# Patient Record
Sex: MALE | Race: WHITE | NOT HISPANIC OR LATINO | Employment: OTHER | URBAN - METROPOLITAN AREA
[De-identification: names, ages, dates, MRNs, and addresses within clinical notes are randomized per-mention and may not be internally consistent; named-entity substitution may affect disease eponyms.]

---

## 2019-03-14 ENCOUNTER — TRANSFERRED RECORDS (OUTPATIENT)
Dept: HEALTH INFORMATION MANAGEMENT | Facility: CLINIC | Age: 69
End: 2019-03-14

## 2019-03-14 ENCOUNTER — OFFICE VISIT - HEALTHEAST (OUTPATIENT)
Dept: INTERNAL MEDICINE | Facility: CLINIC | Age: 69
End: 2019-03-14

## 2019-03-14 DIAGNOSIS — Z23 IMMUNIZATION DUE: ICD-10-CM

## 2019-03-14 DIAGNOSIS — R19.8 ABDOMINAL FULLNESS: ICD-10-CM

## 2019-03-14 DIAGNOSIS — R16.1 SPLENOMEGALY: ICD-10-CM

## 2019-03-14 DIAGNOSIS — Z11.59 NEED FOR HEPATITIS C SCREENING TEST: ICD-10-CM

## 2019-03-14 DIAGNOSIS — Z71.89 ADVANCED CARE PLANNING/COUNSELING DISCUSSION: ICD-10-CM

## 2019-03-14 DIAGNOSIS — Z13.1 SCREENING FOR DIABETES MELLITUS: ICD-10-CM

## 2019-03-14 DIAGNOSIS — Z12.11 SCREEN FOR COLON CANCER: ICD-10-CM

## 2019-03-14 DIAGNOSIS — Z13.220 SCREENING FOR HYPERLIPIDEMIA: ICD-10-CM

## 2019-03-14 DIAGNOSIS — Z00.00 ROUTINE GENERAL MEDICAL EXAMINATION AT A HEALTH CARE FACILITY: ICD-10-CM

## 2019-03-14 DIAGNOSIS — Z12.11 SCREENING FOR COLON CANCER: ICD-10-CM

## 2019-03-14 DIAGNOSIS — R33.9 URINARY RETENTION: ICD-10-CM

## 2019-03-14 LAB
ALBUMIN SERPL-MCNC: 3.6 G/DL (ref 3.5–5)
ALBUMIN UR-MCNC: NEGATIVE MG/DL
ALP SERPL-CCNC: 55 U/L (ref 45–120)
ALT SERPL W P-5'-P-CCNC: 17 U/L (ref 0–45)
ANION GAP SERPL CALCULATED.3IONS-SCNC: 8 MMOL/L (ref 5–18)
APPEARANCE UR: CLEAR
AST SERPL W P-5'-P-CCNC: 23 U/L (ref 0–40)
BACTERIA #/AREA URNS HPF: NORMAL HPF
BILIRUB SERPL-MCNC: 1 MG/DL (ref 0–1)
BILIRUB UR QL STRIP: NEGATIVE
BUN SERPL-MCNC: 12 MG/DL (ref 8–22)
CALCIUM SERPL-MCNC: 8.8 MG/DL (ref 8.5–10.5)
CHLORIDE BLD-SCNC: 102 MMOL/L (ref 98–107)
CHOLEST SERPL-MCNC: 178 MG/DL
CO2 SERPL-SCNC: 26 MMOL/L (ref 22–31)
COLOR UR AUTO: YELLOW
CREAT SERPL-MCNC: 0.83 MG/DL (ref 0.7–1.3)
FASTING STATUS PATIENT QL REPORTED: YES
GFR SERPL CREATININE-BSD FRML MDRD: >60 ML/MIN/1.73M2
GLUCOSE BLD-MCNC: 80 MG/DL (ref 70–125)
GLUCOSE UR STRIP-MCNC: NEGATIVE MG/DL
HDLC SERPL-MCNC: 65 MG/DL
HGB UR QL STRIP: ABNORMAL
KETONES UR STRIP-MCNC: NEGATIVE MG/DL
LDLC SERPL CALC-MCNC: 97 MG/DL
LEUKOCYTE ESTERASE UR QL STRIP: NEGATIVE
NITRATE UR QL: NEGATIVE
PH UR STRIP: 7 [PH] (ref 5–8)
POTASSIUM BLD-SCNC: 4.1 MMOL/L (ref 3.5–5)
PROT SERPL-MCNC: 7.3 G/DL (ref 6–8)
RBC #/AREA URNS AUTO: NORMAL HPF
SODIUM SERPL-SCNC: 136 MMOL/L (ref 136–145)
SP GR UR STRIP: 1.01 (ref 1–1.03)
SQUAMOUS #/AREA URNS AUTO: NORMAL LPF
TRIGL SERPL-MCNC: 78 MG/DL
UROBILINOGEN UR STRIP-ACNC: ABNORMAL
WBC #/AREA URNS AUTO: NORMAL HPF

## 2019-03-14 ASSESSMENT — MIFFLIN-ST. JEOR: SCORE: 1628.08

## 2019-03-15 LAB — HCV AB SERPL QL IA: NEGATIVE

## 2019-03-19 ENCOUNTER — RECORDS - HEALTHEAST (OUTPATIENT)
Dept: ADMINISTRATIVE | Facility: OTHER | Age: 69
End: 2019-03-19

## 2019-03-19 LAB — COLOGUARD-ABSTRACT: POSITIVE

## 2019-03-22 ENCOUNTER — COMMUNICATION - HEALTHEAST (OUTPATIENT)
Dept: INTERNAL MEDICINE | Facility: CLINIC | Age: 69
End: 2019-03-22

## 2019-03-22 ENCOUNTER — TRANSFERRED RECORDS (OUTPATIENT)
Dept: HEALTH INFORMATION MANAGEMENT | Facility: CLINIC | Age: 69
End: 2019-03-22

## 2019-03-22 ENCOUNTER — HOSPITAL ENCOUNTER (OUTPATIENT)
Dept: CT IMAGING | Facility: CLINIC | Age: 69
Discharge: HOME | End: 2019-03-22
Attending: INTERNAL MEDICINE

## 2019-03-22 DIAGNOSIS — R19.8 ABDOMINAL FULLNESS: ICD-10-CM

## 2019-03-28 ENCOUNTER — COMMUNICATION - HEALTHEAST (OUTPATIENT)
Dept: INTERNAL MEDICINE | Facility: CLINIC | Age: 69
End: 2019-03-28

## 2019-03-29 ENCOUNTER — RECORDS - HEALTHEAST (OUTPATIENT)
Dept: HEALTH INFORMATION MANAGEMENT | Facility: CLINIC | Age: 69
End: 2019-03-29

## 2019-04-02 ENCOUNTER — RECORDS - HEALTHEAST (OUTPATIENT)
Dept: ADMINISTRATIVE | Facility: OTHER | Age: 69
End: 2019-04-02

## 2019-04-02 ENCOUNTER — COMMUNICATION - HEALTHEAST (OUTPATIENT)
Dept: INTERNAL MEDICINE | Facility: CLINIC | Age: 69
End: 2019-04-02

## 2019-04-03 ENCOUNTER — RECORDS - HEALTHEAST (OUTPATIENT)
Dept: ADMINISTRATIVE | Facility: OTHER | Age: 69
End: 2019-04-03

## 2019-04-03 ENCOUNTER — COMMUNICATION - HEALTHEAST (OUTPATIENT)
Dept: SCHEDULING | Facility: CLINIC | Age: 69
End: 2019-04-03

## 2019-04-03 ENCOUNTER — TRANSFERRED RECORDS (OUTPATIENT)
Dept: HEALTH INFORMATION MANAGEMENT | Facility: CLINIC | Age: 69
End: 2019-04-03

## 2019-04-05 ENCOUNTER — COMMUNICATION - HEALTHEAST (OUTPATIENT)
Dept: ONCOLOGY | Facility: CLINIC | Age: 69
End: 2019-04-05

## 2019-04-15 ENCOUNTER — RECORDS - HEALTHEAST (OUTPATIENT)
Dept: ADMINISTRATIVE | Facility: OTHER | Age: 69
End: 2019-04-15

## 2019-04-16 ENCOUNTER — RECORDS - HEALTHEAST (OUTPATIENT)
Dept: ADMINISTRATIVE | Facility: OTHER | Age: 69
End: 2019-04-16

## 2019-04-16 ENCOUNTER — OFFICE VISIT - HEALTHEAST (OUTPATIENT)
Dept: ONCOLOGY | Facility: CLINIC | Age: 69
End: 2019-04-16

## 2019-04-16 DIAGNOSIS — R16.1 SPLENOMEGALY: ICD-10-CM

## 2019-04-16 DIAGNOSIS — D72.820 LYMPHOCYTOSIS: ICD-10-CM

## 2019-04-16 LAB
ALBUMIN SERPL-MCNC: 3.7 G/DL (ref 3.5–5)
ALP SERPL-CCNC: 56 U/L (ref 45–120)
ALT SERPL W P-5'-P-CCNC: 14 U/L (ref 0–45)
ANION GAP SERPL CALCULATED.3IONS-SCNC: 5 MMOL/L (ref 5–18)
AST SERPL W P-5'-P-CCNC: 21 U/L (ref 0–40)
BASOPHILS # BLD AUTO: 0.2 THOU/UL (ref 0–0.2)
BASOPHILS NFR BLD AUTO: 2 % (ref 0–2)
BILIRUB SERPL-MCNC: 0.8 MG/DL (ref 0–1)
BUN SERPL-MCNC: 8 MG/DL (ref 8–22)
CALCIUM SERPL-MCNC: 8.6 MG/DL (ref 8.5–10.5)
CHLORIDE BLD-SCNC: 99 MMOL/L (ref 98–107)
CO2 SERPL-SCNC: 28 MMOL/L (ref 22–31)
CREAT SERPL-MCNC: 0.84 MG/DL (ref 0.7–1.3)
EOSINOPHIL COUNT (ABSOLUTE): 0 THOU/UL (ref 0–0.4)
EOSINOPHIL NFR BLD AUTO: 0 % (ref 0–6)
ERYTHROCYTE [DISTWIDTH] IN BLOOD BY AUTOMATED COUNT: 13.2 % (ref 11–14.5)
ERYTHROCYTE [DISTWIDTH] IN BLOOD BY AUTOMATED COUNT: 13.2 % (ref 11–14.5)
FOLATE SERPL-MCNC: 13.8 NG/ML
GFR SERPL CREATININE-BSD FRML MDRD: >60 ML/MIN/1.73M2
GLUCOSE BLD-MCNC: 73 MG/DL (ref 70–125)
HCT VFR BLD AUTO: 42.5 % (ref 40–54)
HCT VFR BLD AUTO: 42.5 % (ref 40–54)
HGB BLD-MCNC: 14.7 G/DL (ref 14–18)
HGB BLD-MCNC: 14.7 G/DL (ref 14–18)
IGA SERPL-MCNC: 2298 MG/DL
IGA SERPL-MCNC: 32 MG/DL (ref 65–400)
IGM SERPL-MCNC: 41 MG/DL (ref 60–280)
LYMPHOCYTES # BLD AUTO: 5.8 THOU/UL (ref 0.8–4.4)
LYMPHOCYTES NFR BLD AUTO: 64 % (ref 20–40)
MCH RBC QN AUTO: 32.4 PG (ref 27–34)
MCH RBC QN AUTO: 32.4 PG (ref 27–34)
MCHC RBC AUTO-ENTMCNC: 34.6 G/DL (ref 32–36)
MCHC RBC AUTO-ENTMCNC: 34.6 G/DL (ref 32–36)
MCV RBC AUTO: 94 FL (ref 80–100)
MCV RBC AUTO: 94 FL (ref 80–100)
MONOCYTES # BLD AUTO: 0.2 THOU/UL (ref 0–0.9)
MONOCYTES NFR BLD AUTO: 2 % (ref 2–10)
PATH REPORT.MICROSCOPIC SPEC OTHER STN: ABNORMAL
PLAT MORPH BLD: ABNORMAL
PLATELET # BLD AUTO: 126 THOU/UL (ref 140–440)
PLATELET # BLD AUTO: 126 THOU/UL (ref 140–440)
PMV BLD AUTO: 9.2 FL (ref 8.5–12.5)
PMV BLD AUTO: 9.2 FL (ref 8.5–12.5)
POTASSIUM BLD-SCNC: 3.8 MMOL/L (ref 3.5–5)
PROT SERPL-MCNC: 7.2 G/DL (ref 6–8)
RBC # BLD AUTO: 4.54 MILL/UL (ref 4.4–6.2)
RBC # BLD AUTO: 4.54 MILL/UL (ref 4.4–6.2)
SODIUM SERPL-SCNC: 132 MMOL/L (ref 136–145)
TOTAL NEUTROPHILS-ABS(DIFF): 2.9 THOU/UL (ref 2–7.7)
TOTAL NEUTROPHILS-REL(DIFF): 32 % (ref 50–70)
VIT B12 SERPL-MCNC: 1009 PG/ML (ref 213–816)
WBC: 9 THOU/UL (ref 4–11)
WBC: 9 THOU/UL (ref 4–11)

## 2019-04-16 ASSESSMENT — MIFFLIN-ST. JEOR: SCORE: 1610.84

## 2019-04-17 ENCOUNTER — RECORDS - HEALTHEAST (OUTPATIENT)
Dept: ADMINISTRATIVE | Facility: OTHER | Age: 69
End: 2019-04-17

## 2019-04-17 LAB
B2 MICROGLOB TUMOR MARKER SER-MCNC: 2.5 MG/L
KAPPA LC FREE SER-MCNC: 0.78 MG/DL (ref 0.33–1.94)
KAPPA LC FREE/LAMBDA FREE SER NEPH: 0.04 {RATIO} (ref 0.26–1.65)
LAB AP CHARGES (HE HISTORICAL CONVERSION): ABNORMAL
LAMBDA LC FREE SERPL-MCNC: 17.5 MG/DL (ref 0.57–2.63)
PATH ICD:: NORMAL
PATH REPORT.COMMENTS IMP SPEC: ABNORMAL
PATH REPORT.COMMENTS IMP SPEC: ABNORMAL
PATH REPORT.FINAL DX SPEC: ABNORMAL
PATH REPORT.MICROSCOPIC SPEC OTHER STN: ABNORMAL
PATH REPORT.RELEVANT HX SPEC: ABNORMAL
PROT PATTERN SERPL IFE-IMP: NORMAL
RESULT FLAG (HE HISTORICAL CONVERSION): ABNORMAL
REVIEWING PATHOLOGIST: NORMAL

## 2019-04-18 LAB
ALBUMIN PERCENT: 56.1 % (ref 51–67)
ALBUMIN SERPL ELPH-MCNC: 3.9 G/DL (ref 3.2–4.7)
ALPHA 1 PERCENT: 2.4 % (ref 2–4)
ALPHA 2 PERCENT: 7.9 % (ref 5–13)
ALPHA1 GLOB SERPL ELPH-MCNC: 0.2 G/DL (ref 0.1–0.3)
ALPHA2 GLOB SERPL ELPH-MCNC: 0.6 G/DL (ref 0.4–0.9)
B-GLOBULIN SERPL ELPH-MCNC: 0.6 G/DL (ref 0.7–1.2)
BETA PERCENT: 8.4 % (ref 10–17)
GAMMA GLOB SERPL ELPH-MCNC: 1.8 G/DL (ref 0.6–1.4)
GAMMA GLOBULIN PERCENT: 25.2 % (ref 9–20)
LAB AP CHARGES (HE HISTORICAL CONVERSION): ABNORMAL
M PROTEIN SERPL ELPH-MCNC: 1.4 G/DL
PATH ICD:: ABNORMAL
PATH REPORT.COMMENTS IMP SPEC: ABNORMAL
PATH REPORT.COMMENTS IMP SPEC: ABNORMAL
PATH REPORT.FINAL DX SPEC: ABNORMAL
PATH REPORT.MICROSCOPIC SPEC OTHER STN: ABNORMAL
PROT PATTERN SERPL ELPH-IMP: ABNORMAL
PROT SERPL-MCNC: 7 G/DL (ref 6–8)
RESULT FLAG (HE HISTORICAL CONVERSION): ABNORMAL
REVIEWING PATHOLOGIST: ABNORMAL

## 2019-04-22 LAB
PATH ICD:: NORMAL
PROT ELPH PNL UR ELPH: NORMAL
REVIEWING PATHOLOGIST: NORMAL

## 2019-04-24 ENCOUNTER — COMMUNICATION - HEALTHEAST (OUTPATIENT)
Dept: INTERNAL MEDICINE | Facility: CLINIC | Age: 69
End: 2019-04-24

## 2019-04-25 ENCOUNTER — OFFICE VISIT - HEALTHEAST (OUTPATIENT)
Dept: ONCOLOGY | Facility: CLINIC | Age: 69
End: 2019-04-25

## 2019-04-25 DIAGNOSIS — R16.1 SPLENOMEGALY: ICD-10-CM

## 2019-04-25 DIAGNOSIS — D47.2 MGUS (MONOCLONAL GAMMOPATHY OF UNKNOWN SIGNIFICANCE): ICD-10-CM

## 2019-04-25 DIAGNOSIS — Z13.79 ENCOUNTER FOR OTHER SCREENING FOR GENETIC AND CHROMOSOMAL ANOMALIES: ICD-10-CM

## 2019-04-25 DIAGNOSIS — C91.10 CLL (CHRONIC LYMPHOCYTIC LEUKEMIA) (H): ICD-10-CM

## 2019-05-01 ENCOUNTER — RECORDS - HEALTHEAST (OUTPATIENT)
Dept: ADMINISTRATIVE | Facility: OTHER | Age: 69
End: 2019-05-01

## 2019-05-01 ENCOUNTER — TRANSFERRED RECORDS (OUTPATIENT)
Dept: HEALTH INFORMATION MANAGEMENT | Facility: CLINIC | Age: 69
End: 2019-05-01

## 2019-05-01 ENCOUNTER — COMMUNICATION - HEALTHEAST (OUTPATIENT)
Dept: INTERNAL MEDICINE | Facility: CLINIC | Age: 69
End: 2019-05-01

## 2019-05-01 DIAGNOSIS — R33.9 URINARY RETENTION: ICD-10-CM

## 2019-05-02 ENCOUNTER — AMBULATORY - HEALTHEAST (OUTPATIENT)
Dept: INFUSION THERAPY | Facility: CLINIC | Age: 69
End: 2019-05-02

## 2019-05-02 ENCOUNTER — AMBULATORY - HEALTHEAST (OUTPATIENT)
Dept: LAB | Facility: CLINIC | Age: 69
End: 2019-05-02

## 2019-05-02 DIAGNOSIS — C91.10 CLL (CHRONIC LYMPHOCYTIC LEUKEMIA) (H): ICD-10-CM

## 2019-05-02 DIAGNOSIS — D47.2 MGUS (MONOCLONAL GAMMOPATHY OF UNKNOWN SIGNIFICANCE): ICD-10-CM

## 2019-05-02 DIAGNOSIS — Z13.79 ENCOUNTER FOR OTHER SCREENING FOR GENETIC AND CHROMOSOMAL ANOMALIES: ICD-10-CM

## 2019-05-02 LAB
BASOPHILS # BLD AUTO: 0 THOU/UL (ref 0–0.2)
BASOPHILS NFR BLD AUTO: 0 % (ref 0–2)
EOSINOPHIL COUNT (ABSOLUTE): 0.1 THOU/UL (ref 0–0.4)
EOSINOPHIL NFR BLD AUTO: 1 % (ref 0–6)
ERYTHROCYTE [DISTWIDTH] IN BLOOD BY AUTOMATED COUNT: 13.1 % (ref 11–14.5)
HCT VFR BLD AUTO: 40.2 % (ref 40–54)
HGB BLD-MCNC: 13.6 G/DL (ref 14–18)
LYMPHOCYTES # BLD AUTO: 5.9 THOU/UL (ref 0.8–4.4)
LYMPHOCYTES NFR BLD AUTO: 60 % (ref 20–40)
MCH RBC QN AUTO: 32.2 PG (ref 27–34)
MCHC RBC AUTO-ENTMCNC: 33.8 G/DL (ref 32–36)
MCV RBC AUTO: 95 FL (ref 80–100)
MONOCYTES # BLD AUTO: 0.5 THOU/UL (ref 0–0.9)
MONOCYTES NFR BLD AUTO: 5 % (ref 2–10)
PLAT MORPH BLD: ABNORMAL
PLATELET # BLD AUTO: 126 THOU/UL (ref 140–440)
PMV BLD AUTO: 8.9 FL (ref 8.5–12.5)
RBC # BLD AUTO: 4.22 MILL/UL (ref 4.4–6.2)
REACTIVE LYMPHS: ABNORMAL
TOTAL NEUTROPHILS-ABS(DIFF): 3.4 THOU/UL (ref 2–7.7)
TOTAL NEUTROPHILS-REL(DIFF): 34 % (ref 50–70)
WBC: 9.9 THOU/UL (ref 4–11)

## 2019-05-03 ENCOUNTER — COMMUNICATION - HEALTHEAST (OUTPATIENT)
Dept: INTERNAL MEDICINE | Facility: CLINIC | Age: 69
End: 2019-05-03

## 2019-05-03 LAB
LAB AP CHARGES (HE HISTORICAL CONVERSION): ABNORMAL
PATH REPORT.COMMENTS IMP SPEC: ABNORMAL
PATH REPORT.COMMENTS IMP SPEC: ABNORMAL
PATH REPORT.FINAL DX SPEC: ABNORMAL
PATH REPORT.MICROSCOPIC SPEC OTHER STN: ABNORMAL
RESULT FLAG (HE HISTORICAL CONVERSION): ABNORMAL

## 2019-05-05 ENCOUNTER — COMMUNICATION - HEALTHEAST (OUTPATIENT)
Dept: INTERNAL MEDICINE | Facility: CLINIC | Age: 69
End: 2019-05-05

## 2019-05-07 ENCOUNTER — COMMUNICATION - HEALTHEAST (OUTPATIENT)
Dept: INTERNAL MEDICINE | Facility: CLINIC | Age: 69
End: 2019-05-07

## 2019-05-08 ENCOUNTER — RECORDS - HEALTHEAST (OUTPATIENT)
Dept: ADMINISTRATIVE | Facility: OTHER | Age: 69
End: 2019-05-08

## 2019-05-09 ENCOUNTER — PRE VISIT (OUTPATIENT)
Dept: UROLOGY | Facility: CLINIC | Age: 69
End: 2019-05-09

## 2019-05-09 NOTE — TELEPHONE ENCOUNTER
MEDICAL RECORDS REQUEST   [facility]  Urology Clinic  [address] [place] [address]  PHONE: [phone]  Fax: [phone]        FUTURE VISIT INFORMATION                                                   Hilario Meeks, : 1950 scheduled for future visit at [facility]    APPOINTMENT INFORMATION:    Date: 19 10AM    Provider:  Monty Jaramillo MD    Reason for Visit/Diagnosis: Urinary Retention    REFERRAL INFORMATION:    Referring provider: Matthew Hopper    Specialty: MD    Referring providers clinic:  [facility]     Clinic contact number:  [phone]    RECORDS REQUESTED FOR VISIT                                                     NOTES  STATUS/DETAILS   OFFICE NOTE from referring provider  yes   OFFICE NOTE from other specialist  no   DISCHARGE SUMMARY from hospital  no   DISCHARGE REPORT from the ER  no   OPERATIVE REPORT  no   MEDICATION LIST  yes   LABS     URINALYSIS (UA)  yes     PRE-VISIT CHECKLIST      Record collection complete If no, please explain:  [facility] recs in [facility] Urology recs received   CT Abd Pelvis 3/22/19 in  PACS    Appointment appropriately scheduled           (right time/right provider) Yes   MyChart activation If no, please explain: In process   Questionnaire complete If no, please explain: In process     Completed by: Danika Veloz

## 2019-05-10 LAB
BKR LAB AP CORE BIOPSY/ASPIRATE CLOT: ABNORMAL
LAB AP ASPIRATE SMEAR (HE HISTORICAL CONVERSION): ABNORMAL
LAB AP BONE MARROW DIFF (HE HISTORICAL CONVERSION): ABNORMAL
LAB AP CHARGES (HE HISTORICAL CONVERSION): ABNORMAL
LAB AP CYTOGENETICS REPORT,ADDENDUM (HE HISTORICAL CONVERSION): ABNORMAL
PATH REPORT.COMMENTS IMP SPEC: ABNORMAL
PATH REPORT.FINAL DX SPEC: ABNORMAL
PATH REPORT.MICROSCOPIC SPEC OTHER STN: ABNORMAL
PATH REPORT.MICROSCOPIC SPEC OTHER STN: ABNORMAL
PATH REPORT.RELEVANT HX SPEC: ABNORMAL
RESULT FLAG (HE HISTORICAL CONVERSION): ABNORMAL

## 2019-05-11 LAB
MISCELLANEOUS TEST DEPT. - HE HISTORICAL: NORMAL
PERFORMING LAB: NORMAL
SPECIMEN STATUS: NORMAL
SPECIMEN STATUS: NORMAL
TEST NAME: NORMAL

## 2019-05-14 ENCOUNTER — OFFICE VISIT - HEALTHEAST (OUTPATIENT)
Dept: ONCOLOGY | Facility: CLINIC | Age: 69
End: 2019-05-14

## 2019-05-14 DIAGNOSIS — D47.2 MGUS (MONOCLONAL GAMMOPATHY OF UNKNOWN SIGNIFICANCE): ICD-10-CM

## 2019-05-14 DIAGNOSIS — C91.10 CLL (CHRONIC LYMPHOCYTIC LEUKEMIA) (H): ICD-10-CM

## 2019-05-28 ENCOUNTER — PRE VISIT (OUTPATIENT)
Dept: UROLOGY | Facility: CLINIC | Age: 69
End: 2019-05-28

## 2019-05-29 ENCOUNTER — RECORDS - HEALTHEAST (OUTPATIENT)
Dept: ADMINISTRATIVE | Facility: OTHER | Age: 69
End: 2019-05-29

## 2019-05-29 ENCOUNTER — OFFICE VISIT (OUTPATIENT)
Dept: UROLOGY | Facility: CLINIC | Age: 69
End: 2019-05-29
Payer: COMMERCIAL

## 2019-05-29 VITALS
SYSTOLIC BLOOD PRESSURE: 139 MMHG | HEART RATE: 53 BPM | WEIGHT: 169.8 LBS | HEIGHT: 74 IN | BODY MASS INDEX: 21.79 KG/M2 | DIASTOLIC BLOOD PRESSURE: 92 MMHG

## 2019-05-29 DIAGNOSIS — R33.9 URINARY RETENTION: ICD-10-CM

## 2019-05-29 DIAGNOSIS — R39.9 LOWER URINARY TRACT SYMPTOMS: Primary | ICD-10-CM

## 2019-05-29 ASSESSMENT — ENCOUNTER SYMPTOMS
HEMATURIA: 1
BRUISES/BLEEDS EASILY: 0
DYSURIA: 0
SWOLLEN GLANDS: 0
FLANK PAIN: 0

## 2019-05-29 ASSESSMENT — MIFFLIN-ST. JEOR: SCORE: 1609.96

## 2019-05-29 ASSESSMENT — PAIN SCALES - GENERAL: PAINLEVEL: NO PAIN (0)

## 2019-05-29 NOTE — LETTER
"5/29/2019       RE: Hilario Meeks  [address]  [address]  [facility] [address]     Dear Colleague,    Thank you for referring your patient, Hilario Meeks, to the [facility] UROLOGY AND INST FOR PROSTATE AND UROLOGIC CANCERS at [facility] Please see a copy of my visit note below.      Name: Hilario Meeks    MRN: [id number]   YOB: 1950                 Chief Complaint:   BPH with urinary retention          History of Present Illness:   Mr. Hilario Meeks is a 69 year old male seen in consultation today.  He is here for urinary retention with BPH.  This past summer - about 9-12 months ago, he went into urinary retention with a distended abdomen.  CT demonstrated a distended bladder.  About 10 weeks ago, went to [facility]. Catheter was placed.  Flomax was started. This was increased at one point to BID. He is not taking Flomax now.  He tried a voiding trial.  They recommended a TURP.  Cystoscopy was done at [facility].  PSA in April 2019 - 3.19         Past Medical History:     Past Medical History:   Diagnosis Date     BPH (benign prostatic hyperplasia)             Past Surgical History:   Tonsillectomy         Social History:     Social History     Tobacco Use     Smoking status: Never Smoker     Smokeless tobacco: Never Used   Substance Use Topics     Alcohol use: Not on file            Family History:   None relevant         Allergies:   No Known Allergies         Medications:   Medications:    None        Review of Systems:    ROS: 14 point ROS neg other than the symptoms noted above in the HPI and PMH.          Physical Exam:   B/P: 139/92, T: Data Unavailable, P: 53, R: Data Unavailable  Estimated body mass index is 21.8 kg/m  as calculated from the following:    Height as of this encounter: 1.88 m (6' 2\").    Weight as of this encounter: 77 kg (169 lb 12.8 oz).  General: age-appropriate appearing male in NAD. normal body " habitus.  HEENT: Head AT/NC, EOMI, CN Grossly intact  Resp: no respiratory distress, lung sounds clear.  CV: heart rate regular, S1, S2.  Lymph: No cervical, supraclavicular or axillary lymphadenopathy  Back: bony spine is non-tender, flanks are nontender  Abdomen: no obesity , soft, non-distended, non-tender. No organomegaly  : testicles normal without atrophy or masses and penis normal without urethral discharge; 16 fr catheter in place  LE: no edema.   Neuro: grossly intact  Motor: excellent strength throughout  Skin: clear of rashes or ecchymoses.        Labs:    All laboratory data reviewed with patient  Significant for PSA 3.19      Imaging:    All imaging reviewed with patient.  Significant for CT with massively distended bladder.   Very large prostate: 6.5 cm x 6.5cm x 5.5 cm by my measurement      Outside records:    I spent 10 minutes reviewing outside records.         Assessment and Plan:   69 year old male with BPH.  Massively distended bladder  Large prostate which is obstructive. Failed voiding trials x 2 on Flomax.  Here for second opinion re: BPH treatment options.  Discussed TURP vs. HoLEP vs. Simple prostatectomy.  Discussed I would favor HOLEP given size of prostate.   Current catheter was removed. New 16Fr catheter was placed without difficulty. 10cc in the balloon.  Refer to Dr. Turner for cystoscopic evaluation and discussion of surgical options.    Monty Jaramillo MD  May 29, 2019

## 2019-05-29 NOTE — PATIENT INSTRUCTIONS
Please follow up with Dr. Bright for Cystoscopy, within the next month, and a cath change at that time (16 Fr regular urethra cath).      It was a pleasure meeting with you today.  Thank you for allowing me and my team the privilege of caring for you today.  YOU are the reason we are here, and I truly hope we provided you with the excellent service you deserve.  Please let us know if there is anything else we can do for you so that we can be sure you are leaving completely satisfied with your care experience.      Hilario Biggs

## 2019-05-29 NOTE — PROGRESS NOTES
"  Name: Hilario Meeks    MRN: [id number]   YOB: 1950                 Chief Complaint:   BPH with urinary retention          History of Present Illness:   Mr. Hilario Meeks is a 69 year old male seen in consultation today.  He is here for urinary retention with BPH.  This past summer - about 9-12 months ago, he went into urinary retention with a distended abdomen.  CT demonstrated a distended bladder.  About 10 weeks ago, went to [facility]. Catheter was placed.  Flomax was started. This was increased at one point to BID. He is not taking Flomax now.  He tried a voiding trial.  They recommended a TURP.  Cystoscopy was done at [facility].  PSA in April 2019 - 3.19         Past Medical History:     Past Medical History:   Diagnosis Date     BPH (benign prostatic hyperplasia)             Past Surgical History:   Tonsillectomy         Social History:     Social History     Tobacco Use     Smoking status: Never Smoker     Smokeless tobacco: Never Used   Substance Use Topics     Alcohol use: Not on file            Family History:   None relevant         Allergies:   No Known Allergies         Medications:   Medications:    None        Review of Systems:    ROS: 14 point ROS neg other than the symptoms noted above in the HPI and PMH.          Physical Exam:   B/P: 139/92, T: Data Unavailable, P: 53, R: Data Unavailable  Estimated body mass index is 21.8 kg/m  as calculated from the following:    Height as of this encounter: 1.88 m (6' 2\").    Weight as of this encounter: 77 kg (169 lb 12.8 oz).  General: age-appropriate appearing male in NAD. normal body habitus.  HEENT: Head AT/NC, EOMI, CN Grossly intact  Resp: no respiratory distress, lung sounds clear.  CV: heart rate regular, S1, S2.  Lymph: No cervical, supraclavicular or axillary lymphadenopathy  Back: bony spine is non-tender, flanks are nontender  Abdomen: no obesity , soft, non-distended, non-tender. No organomegaly  : " testicles normal without atrophy or masses and penis normal without urethral discharge; 16 fr catheter in place  LE: no edema.   Neuro: grossly intact  Motor: excellent strength throughout  Skin: clear of rashes or ecchymoses.        Labs:    All laboratory data reviewed with patient  Significant for PSA 3.19      Imaging:    All imaging reviewed with patient.  Significant for CT with massively distended bladder.   Very large prostate: 6.5 cm x 6.5cm x 5.5 cm by my measurement      Outside records:    I spent 10 minutes reviewing outside records.         Assessment and Plan:   69 year old male with BPH.  Massively distended bladder  Large prostate which is obstructive. Failed voiding trials x 2 on Flomax.  Here for second opinion re: BPH treatment options.  Discussed TURP vs. HoLEP vs. Simple prostatectomy.  Discussed I would favor HOLEP given size of prostate.   Current catheter was removed. New 16Fr catheter was placed without difficulty. 10cc in the balloon.  Refer to Dr. Turner for cystoscopic evaluation and discussion of surgical options.    Monty Jaramillo MD  May 29, 2019

## 2019-06-06 ENCOUNTER — COMMUNICATION - HEALTHEAST (OUTPATIENT)
Dept: INTERNAL MEDICINE | Facility: CLINIC | Age: 69
End: 2019-06-06

## 2019-06-12 ENCOUNTER — PRE VISIT (OUTPATIENT)
Dept: UROLOGY | Facility: CLINIC | Age: 69
End: 2019-06-12

## 2019-06-18 ENCOUNTER — RECORDS - HEALTHEAST (OUTPATIENT)
Dept: ADMINISTRATIVE | Facility: OTHER | Age: 69
End: 2019-06-18

## 2019-06-18 ENCOUNTER — PRE VISIT (OUTPATIENT)
Dept: SURGERY | Facility: CLINIC | Age: 69
End: 2019-06-18

## 2019-06-18 ENCOUNTER — OFFICE VISIT (OUTPATIENT)
Dept: UROLOGY | Facility: CLINIC | Age: 69
End: 2019-06-18
Payer: COMMERCIAL

## 2019-06-18 ENCOUNTER — ALLIED HEALTH/NURSE VISIT (OUTPATIENT)
Dept: UROLOGY | Facility: CLINIC | Age: 69
End: 2019-06-18
Payer: COMMERCIAL

## 2019-06-18 VITALS
HEART RATE: 60 BPM | BODY MASS INDEX: 21.97 KG/M2 | SYSTOLIC BLOOD PRESSURE: 118 MMHG | DIASTOLIC BLOOD PRESSURE: 76 MMHG | WEIGHT: 171.2 LBS | HEIGHT: 74 IN

## 2019-06-18 DIAGNOSIS — R33.9 URINARY RETENTION: Primary | ICD-10-CM

## 2019-06-18 RX ORDER — CIPROFLOXACIN 500 MG/1
500 TABLET, FILM COATED ORAL ONCE
Status: COMPLETED | OUTPATIENT
Start: 2019-06-18 | End: 2019-06-18

## 2019-06-18 RX ORDER — LIDOCAINE HYDROCHLORIDE 20 MG/ML
10 JELLY TOPICAL ONCE
Status: COMPLETED | OUTPATIENT
Start: 2019-06-18 | End: 2019-06-18

## 2019-06-18 RX ADMIN — LIDOCAINE HYDROCHLORIDE 10 ML: 20 JELLY TOPICAL at 12:37

## 2019-06-18 RX ADMIN — CIPROFLOXACIN 500 MG: 500 TABLET, FILM COATED ORAL at 12:37

## 2019-06-18 ASSESSMENT — MIFFLIN-ST. JEOR: SCORE: 1616.31

## 2019-06-18 ASSESSMENT — PAIN SCALES - GENERAL: PAINLEVEL: NO PAIN (0)

## 2019-06-18 NOTE — PROGRESS NOTES
Urology Clinic    Chester Turner MD  Date of Service: 2019     Name: Hilario Meeks  MRN: [id number]  Age: 69 year old  : 1950  Referring provider: Referred Self     Assessment and Plan:  Assessment:  Hilario Meeks  is a 69 year old male with BPH and urinary retention.     Plan:  After discussion of medical and surgical treatments for BPH including alpha blockers, anticholinergics, transurethral resection, laser ablation, enucleation and simple prostatectomy, Mr. Meeks has decided to proceed with Holmium Laser Enucleation of the Prostate (HoLEP).    We discussed the associated risks of this procedure included but not limited to the following:  -Bleeding, potentially significant enough to require clot evacuation and blood transfusion  -Infection, for which we will plan to treat preoperatively based on targeted antibiotic therapy  -Damage to the bladder, urethra and penis including the risk of urethral stricture and bladder neck contracture  -Risk of incidentally discovered prostate cancer.  We discussed that this would not preclude him from further therapy though it could prolong recovery and potentially increase risk of complications associated with cancer treatment.  Further preoperative workup to assess for prostate cancer prior to surgery was offered but patient deferred.  -Risk of retrograde ejaculation which would be expected to occur in the majority if not all men after HoLEP  -Risk of urinary incontinence.  We discussed that in the majority of men this is a transient process that is generally self limited to the first 6-12 weeks after surgery though could take longer to resolve depending on baseline bladder instability.  -Risk of postperative urinary retention though in published series HoLEP has been found to be associated with high success rates of achieving spontaneous voiding even in men with underactive and atonic bladders.  -We will proceed with preoperative clearance with  preference to minimize all anticoagulation as deemed acceptable by his primary care provider.       ______________________________________________________________________    HPI  Hilario Meeks is a 69 year old male with a history of BPH with urinary retention referred by Dr. Jaramillo for cystoscopy and consideration of HoLEP.     In summer 2018, he went into urinary retention and was found to have a distended bladder. About 3 months ago, [facility] placed a catheter. Flomax was started and increased to twice daily but he no longer taking this. He has failed a voiding trial x 2. PSA was 3.9 in 04/2019.     Review of Systems:   Pertinent items are noted in HPI or as below, remainder of complete ROS is negative.      CYSTOSCOPY  After obtaining informed consent, the patient was prepped and draped in the standard sterile fashion.  The 15 Danish flexible cystoscope was inserted through the urethral meatus.      The anterior urethra was:  normal without stricture.    The external sphincter was  appropriately coapted.   The prostatic urethra demonstrated trilobar hypertrophy.    The bladder neck was occluded by a median lobe  The bladder was  unremarkable for tumors, erythema or stones.  There were significant trabeculations.  The ureteral orifices  were identified on each side in orthotopic position with efflux of clear urine.   On retroflexion there was the usual bladder neck hyperemia.    There was considerable intravesical protrusion of the prostate.      The patient tolerated the procedure well without complication.      Laboratory:   I personally reviewed all applicable laboratory data and went over findings with patient  Significant for:    04/2019: PSA 3.19     Imaging:   I personally reviewed all applicable imaging and went over the below findings with patient.    CT abdomen pelvis without contrast 03/22/2019:   1.  Markedly urine-distended bladder which measures 18 x 10 x 9 cm as  above.  2.  Splenomegaly with perisplenic varices may be reflective of portal hypertension.  3.  Hepatic steatosis with small hypodensities in the right and left hepatic lobes difficult to fully characterize but likely are cysts  4. ASCVD aorta with the aorta measuring 2.8 x 3.0 cm. AAA Size: 3.0 cm to 3.4 cm, Recommended Follow Up Ultrasound Aorta: Every 3 years  Per radiology.     Scribe Disclosure:  IPilar, a scribe, prepared the chart for today's encounter.     I, Chester Turner saw and evaluated this patient and agree with the plan as stated above.  I personally performed all listed procedures.

## 2019-06-18 NOTE — NURSING NOTE
"Chief Complaint   Patient presents with     Cystoscopy     BPH, catheter change       Blood pressure 118/76, pulse 60, height 1.88 m (6' 2\"), weight 77.7 kg (171 lb 3.2 oz). Body mass index is 21.98 kg/m .    There is no problem list on file for this patient.      No Known Allergies    No current outpatient medications on file.       Social History     Tobacco Use     Smoking status: Never Smoker     Smokeless tobacco: Never Used   Substance Use Topics     Alcohol use: Not on file     Drug use: Not on file       Carole Toledo LPN  6/18/2019  12:30 PM     "

## 2019-06-18 NOTE — LETTER
2019       RE: Hilario Meeks  [address] [facility] [address]  [facility] [address]     Dear Colleague,    Thank you for referring your patient, Hilario Meeks, to the [facility] UROLOGY AND INST FOR PROSTATE AND UROLOGIC CANCERS at [facility]. Please see a copy of my visit note below.      Urology Clinic    Chester Turner MD  Date of Service: 2019     Name: Hilario Meeks  MRN: [id number]  Age: 69 year old  : 1950  Referring provider: Referred Self     Assessment and Plan:  Assessment:  Hilario Meeks  is a 69 year old male with BPH and urinary retention.     Plan:  After discussion of medical and surgical treatments for BPH including alpha blockers, anticholinergics, transurethral resection, laser ablation, enucleation and simple prostatectomy, Mr. Meeks has decided to proceed with Holmium Laser Enucleation of the Prostate (HoLEP).    We discussed the associated risks of this procedure included but not limited to the following:  -Bleeding, potentially significant enough to require clot evacuation and blood transfusion  -Infection, for which we will plan to treat preoperatively based on targeted antibiotic therapy  -Damage to the bladder, urethra and penis including the risk of urethral stricture and bladder neck contracture  -Risk of incidentally discovered prostate cancer.  We discussed that this would not preclude him from further therapy though it could prolong recovery and potentially increase risk of complications associated with cancer treatment.  Further preoperative workup to assess for prostate cancer prior to surgery was offered but patient deferred.  -Risk of retrograde ejaculation which would be expected to occur in the majority if not all men after HoLEP  -Risk of urinary incontinence.  We discussed that in the majority of men this is a transient process that is generally self limited to the first 6-12 weeks after surgery though could take  longer to resolve depending on baseline bladder instability.  -Risk of postperative urinary retention though in published series HoLEP has been found to be associated with high success rates of achieving spontaneous voiding even in men with underactive and atonic bladders.  -We will proceed with preoperative clearance with preference to minimize all anticoagulation as deemed acceptable by his primary care provider.       ______________________________________________________________________    HPI  Hilario Meeks is a 69 year old male with a history of BPH with urinary retention referred by Dr. Jaramillo for cystoscopy and consideration of HoLEP.     In summer 2018, he went into urinary retention and was found to have a distended bladder. About 3 months ago, [facility] placed a catheter. Flomax was started and increased to twice daily but he no longer taking this. He has failed a voiding trial x 2. PSA was 3.9 in 04/2019.     Review of Systems:   Pertinent items are noted in HPI or as below, remainder of complete ROS is negative.      CYSTOSCOPY  After obtaining informed consent, the patient was prepped and draped in the standard sterile fashion.  The 15 Wallisian flexible cystoscope was inserted through the urethral meatus.      The anterior urethra was:  normal without stricture.    The external sphincter was  appropriately coapted.   The prostatic urethra demonstrated trilobar hypertrophy.    The bladder neck was occluded by a median lobe  The bladder was  unremarkable for tumors, erythema or stones.  There were significant trabeculations.  The ureteral orifices  were identified on each side in orthotopic position with efflux of clear urine.   On retroflexion there was the usual bladder neck hyperemia.    There was considerable intravesical protrusion of the prostate.      The patient tolerated the procedure well without complication.      Laboratory:   I personally reviewed all applicable laboratory data  and went over findings with patient  Significant for:    04/2019: PSA 3.19     Imaging:   I personally reviewed all applicable imaging and went over the below findings with patient.    CT abdomen pelvis without contrast 03/22/2019:   1.  Markedly urine-distended bladder which measures 18 x 10 x 9 cm as above.  2.  Splenomegaly with perisplenic varices may be reflective of portal hypertension.  3.  Hepatic steatosis with small hypodensities in the right and left hepatic lobes difficult to fully characterize but likely are cysts  4. ASCVD aorta with the aorta measuring 2.8 x 3.0 cm. AAA Size: 3.0 cm to 3.4 cm, Recommended Follow Up Ultrasound Aorta: Every 3 years  Per radiology.     Scribe Disclosure:  I, Pilar Almeida, a scribe, prepared the chart for today's encounter.     I, Chester Turner saw and evaluated this patient and agree with the plan as stated above.  I personally performed all listed procedures.           Again, thank you for allowing me to participate in the care of your patient.      Sincerely,    Chester Turner MD

## 2019-06-18 NOTE — TELEPHONE ENCOUNTER
FUTURE VISIT INFORMATION      SURGERY INFORMATION:    Date: 6/27/19    Location: [facility] OR    Surgeon:  Chester Turner    Anesthesia Type:  General    RECORDS REQUESTED FROM:       Primary Care Provider: Matthew Hopper MD - [facility]    Pertinent Medical History: chronic lymphocytic leukemia

## 2019-06-18 NOTE — NURSING NOTE
"Chief Complaint   Patient presents with     Cystoscopy     BPH, catheter change   Invasive Procedure Safety Checklist:    Procedure: Cystoscopy    Action: Complete sections and checkboxes as appropriate.    Pre-procedure:  1. Patient ID Verified with 2 identifiers (Karen and  or MRN) : YES    2. Procedure and site verified with patient/designee (when able) : YES    3. Accurate consent documentation in medical record : YES    4. H&P (or appropriate assessment) documented in medical record : N/A  H&P must be up to 30 days prior to procedure an updated within 24 hours of                 Procedure as applicable.     5. Relevant diagnostic and radiology test results appropriately labeled and displayed as applicable : YES    6. Blood products, implants, devices, and/or special equipment available for the procedure as applicable : YES    7. Procedure site(s) marked with provider initials [Exclusions: N/A] : YES    8. Marking not required. Reason : Yes  Procedure does not require site marking    Time Out:     Time-Out performed immediately prior to starting procedure, including verbal and active participation of all team members addressing: YES    1. Correct patient identity.  2. Confirmed that the correct side and site are marked.  3. An accurate procedure to be done.  4. Agreement on the procedure to be done.  5. Correct patient position.  6. Relevant images and results are properly labeled and appropriately displayed.  7. The need to administer antibiotics or fluids for irrigation purposes during the procedure as applicable.  8. Safety precautions based on patient history or medication use.    During Procedure: Verification of correct person, site, and procedure occurs any time the responsibility for care of the patient is transferred to another member of the care team.      Blood pressure 118/76, pulse 60, height 1.88 m (6' 2\"), weight 77.7 kg (171 lb 3.2 oz). Body mass index is 21.98 kg/m .    There is no problem " list on file for this patient.      No Known Allergies    No current outpatient medications on file.       Social History     Tobacco Use     Smoking status: Never Smoker     Smokeless tobacco: Never Used   Substance Use Topics     Alcohol use: Not on file     Drug use: Not on file     The following medication was given:     MEDICATION:  Cipro  ROUTE: PO  SITE: mouth  DOSE: 500mg  LOT #: [id number]  : [employer]  EXPIRATION DATE: 10-20  NDC#: 5856-71552-997-112   Was there drug waste? No    Prior to administration, verified patient identity using patient's name and date of birth.      Drug Amount Wasted:  None.  Vial/Syringe: Single dose vial  The following medication was given:     MEDICATION:  Lidocaine Jelly  ROUTE: Urethral  SITE: Urethral  DOSE: 10ml  LOT #: [id number]  : Sukhwinder GEORGE  EXPIRATION DATE: 02-21  NDC#: [address]-8   Was there drug waste? No    Prior to administration, verified patient identity using patient's name and date of birth.    Drug Amount Wasted:  None.  Vial/Syringe: Single dose vial    Hilario Meeks comes into clinic today at the request of Referred Self for catheter change.    Order has been verified: Yes.    Cipro 500 mg given per protocol: Yes      There is no problem list on file for this patient.      No Known Allergies    Hilario Meeks presents to clinic for scheduled [Yes] catheter exchange.  Order has been verified: Yes.    Removal:  16 Fr straight tipped latex piper catheter removed from urethral meatus without difficulty after removing 10cc's of fluid from the balloon.    Insertion:  16 Fr Code tipped latex piper catheter inserted into urethral meatus in the usual sterile fashion without difficulty.  Balloon filled with 10cc's mL sterile H2O.  Received 15 ml clear urine output.   Catheter secured in place with leg strap: Yes.     Patient did tolerate procedure well.    Patient instructed as to where to call or go for pain, fever, leakage, or  decreased urine flow.     Carole Toledo LPN  6/18/2019  1:00 PM    Patient instructed as to where to call or go for pain, fever, leakage, or decreased urine flow.     This service provided today was under the direct supervision of Dr Turner, who was available if needed.      Carole Toledo LPN  6/18/2019  12:33 PM

## 2019-06-18 NOTE — NURSING NOTE
Pre Op Teaching Flowsheet       Pre and Post op Patient Education  Relevant Diagnosis:  BPH      Motivation Level:  Asks Questions: Yes  Eager to Learn:  Yes  Cooperative: Yes  Receptive (willing/able to accept information):  Yes  Patient demonstrates understanding of the following:  Date and time of surgery:  June 27th  [facility]  History and Physical and any other testing necessary prior to surgery: Yes  Required time line for completion of History and Physical and any pre-op testing: Yes, having PAC tomorrow    NPO Guidelines: Nothing to eat 8 hours prior to surgery. Can have clear liquids up to 2 hours prior to sugery    Patient demonstrates understanding of the following:  Pre-op bowel prep: N/A  Pre-op showering/scrub information with Hibiclens Soap: Yes  Medications to take the day of surgery:  Per PCP  Blood thinner medications discussed and when to stop (if applicable):  Yes  Diabetes medication management (if applicable):  N/A  Discussed pain control after surgery: pain scale, pain medications and pain management techniques  Infection Prevention: Patient demonstrates understanding of the following:  Patient instructed on hand hygiene:  Yes  Surgical procedure site care taught: N/A  Signs and symptoms of infection taught:  Yes  Wound care will be taught at the time of discharge.  Central venous catheter care will be taught at the time of discharge (if applicable).    Post-op follow-up:  Discussed how to contact the hospital, nurse, and clinic scheduling staff if necessary.    Instructional materials used/given/mailed:  [facility] Booklet, post op teaching sheet, Map, Soap, and arrival/location information.    Surgical instructions given to patient in clinic: Yes.    Instructional Materials given:  Before your surgery packet , Medications to avoid before surgery , Showering or Bathing instructions before surgery  and What to expect after surgery  Total time  with patient: 10 minutes    Deanne Biggs RN

## 2019-06-19 ENCOUNTER — ANESTHESIA EVENT (OUTPATIENT)
Dept: SURGERY | Facility: CLINIC | Age: 69
End: 2019-06-19
Payer: COMMERCIAL

## 2019-06-19 ENCOUNTER — OFFICE VISIT (OUTPATIENT)
Dept: SURGERY | Facility: CLINIC | Age: 69
End: 2019-06-19
Payer: COMMERCIAL

## 2019-06-19 VITALS
TEMPERATURE: 97.5 F | BODY MASS INDEX: 22.11 KG/M2 | RESPIRATION RATE: 18 BRPM | HEIGHT: 74 IN | DIASTOLIC BLOOD PRESSURE: 77 MMHG | HEART RATE: 56 BPM | WEIGHT: 172.3 LBS | OXYGEN SATURATION: 96 % | SYSTOLIC BLOOD PRESSURE: 126 MMHG

## 2019-06-19 DIAGNOSIS — Z01.818 PREOP EXAMINATION: Primary | ICD-10-CM

## 2019-06-19 SDOH — HEALTH STABILITY: MENTAL HEALTH: HOW OFTEN DO YOU HAVE A DRINK CONTAINING ALCOHOL?: 4 OR MORE TIMES A WEEK

## 2019-06-19 SDOH — HEALTH STABILITY: MENTAL HEALTH: HOW MANY STANDARD DRINKS CONTAINING ALCOHOL DO YOU HAVE ON A TYPICAL DAY?: 1 OR 2

## 2019-06-19 ASSESSMENT — PAIN SCALES - GENERAL: PAINLEVEL: NO PAIN (0)

## 2019-06-19 ASSESSMENT — MIFFLIN-ST. JEOR: SCORE: 1621.3

## 2019-06-19 NOTE — PATIENT INSTRUCTIONS
Preparing for Your Surgery      Name:  Hilario Meeks   MRN:  [id number]   :  1950   Today's Date:  2019     Arriving for surgery:  Surgery date:  2019  Arrival time:  1:30 pm  Please come to:     [facility] [address]  [address] [place]  [address]    - parking is available in front of [facility] from 5:15AM to 8:00PM. If you prefer, park your car in the Green Lot.    -Proceed to the 3rd floor, check in at the Adult Surgery Waiting Lounge. [phone]    If an escort is needed stop at the Information Desk in the lobby. Inform the information person that you are here for surgery. An escort to the Adult Surgery Waiting Lounge will be provided.    What can I eat or drink?  -  You may have solid food or milk products until 8 hours prior to your surgery. (7 am)  -  You may have water, apple juice or 7up/Sprite until 2 hours prior to your surgery.(until 1:30 pm arrival time)    Which medicines can I take?        Stop Aspirin, vitamins and supplements one week prior to surgery.      Hold Ibuprofen for 24 hours and/or Naproxen for 48 hours prior to surgery.     -  Please take these medications the day of surgery:     NONE      How do I prepare myself?  -  Take two showers: one the night before surgery; and one the morning of surgery.         Use Scrubcare or Hibiclens to wash from neck down.  You may use your own shampoo and conditioner. No other hair products.   -  Do NOT use lotion, powder, deodorant, or antiperspirant the day of your surgery.  -  Do NOT wear any makeup, fingernail polish or jewelry.  - Do not bring your own medications to the hospital, except for inhalers and eye drops.  -  Bring your ID and insurance card.    Questions or Concerns:  -If you have questions or concerns regarding the day of surgery, please call   The Pre Admission Nursing Office at [phone].       -For questions after surgery please call your surgeons  office.

## 2019-06-19 NOTE — ANESTHESIA PREPROCEDURE EVALUATION
Anesthesia Pre-Procedure Evaluation    Patient: Hilario Meeks   MRN:     [id number] Gender:   male   Age:    69 year old :      1950        Preoperative Diagnosis: Urinary Retention   Procedure(s):  Holmium Laser Enucleation of the Prostate     Past Medical History:   Diagnosis Date     BPH (benign prostatic hyperplasia)       History reviewed. No pertinent surgical history.       Anesthesia Evaluation     . Pt has had prior anesthetic. Type: MAC           ROS/MED HX    ENT/Pulmonary:  - neg pulmonary ROS     Neurologic:  - neg neurologic ROS     Cardiovascular:  - neg cardiovascular ROS   (+) ----. : . . . :. . No previous cardiac testing       METS/Exercise Tolerance: Comment: Rides bicycle daily >4 METS   Hematologic:  - neg hematologic  ROS       Musculoskeletal:         GI/Hepatic:  - neg GI/hepatic ROS       Renal/Genitourinary: Comment: Urinary retention, has had catheter during past 10 weeks    (+) BPH,       Endo:  - neg endo ROS       Psychiatric:  - neg psychiatric ROS       Infectious Disease:  - neg infectious disease ROS       Malignancy:   (+) Malignancy History of Lymphoma/Leukemia  Lymph CA Active status post, CLL, no current treatment, under surveillance        Other:    (+) No chance of pregnancy C-spine cleared: N/A, no H/O Chronic Pain,                       PHYSICAL EXAM:   Mental Status/Neuro: A/A/O   Airway: Facies: [place]  Mallampati: I  Mouth/Opening: Full  TM distance: > 6 cm  Neck ROM: Limited   Respiratory: Auscultation: CTAB     Resp. Rate: Normal     Resp. Effort: Normal      CV: Rhythm: Regular  Rate: José  Heart: Normal Sounds   Comments:      Dental: Normal                  No results found for: WBC, HGB, HCT, PLT, CRP, SED, NA, POTASSIUM, CHLORIDE, CO2, BUN, CR, GLC, MATHEW, PHOS, MAG, ALBUMIN, PROTTOTAL, ALT, AST, GGT, ALKPHOS, BILITOTAL, BILIDIRECT, LIPASE, AMYLASE, DANIEL, PTT, INR, FIBR, TSH, T4, T3, HCG, HCGS, CKTOTAL, CKMB, TROPN    Preop Vitals  BP Readings from Last  "3 Encounters:   06/19/19 126/77   06/18/19 118/76   05/29/19 (!) 139/92    Pulse Readings from Last 3 Encounters:   06/19/19 56   06/18/19 60   05/29/19 53      Resp Readings from Last 3 Encounters:   06/19/19 18    SpO2 Readings from Last 3 Encounters:   06/19/19 96%      Temp Readings from Last 1 Encounters:   06/19/19 97.5  F (36.4  C) (Oral)    Ht Readings from Last 1 Encounters:   06/19/19 1.88 m (6' 2\")      Wt Readings from Last 1 Encounters:   06/19/19 78.2 kg (172 lb 4.8 oz)    Estimated body mass index is 22.12 kg/m  as calculated from the following:    Height as of this encounter: 1.88 m (6' 2\").    Weight as of this encounter: 78.2 kg (172 lb 4.8 oz).     LDA:            Assessment:   ASA SCORE: 2       Documentation: H&P complete; Preop Testing complete; Consents complete   Proceeding: Proceed without further delay  Tobacco Use:  NO Active use of Tobacco/UNKNOWN Tobacco use status     Plan:   Anes. Type:  General   Pre-Induction: Midazolam IV; Acetaminophen PO   Induction:  IV (Standard)   Airway: LMA   Access/Monitoring: PIV   Maintenance: Balanced   Emergence: Procedure Site   Logistics: Same Day Surgery     Postop Pain/Sedation Strategy:  Standard-Options: Opioids PRN     PONV Management:  Adult Risk Factors:, Non-Smoker, Postop Opioids  Prevention: Ondansetron                  PAC Discussion and Assessment    ASA Classification: 2  Case is suitable for: [place]  Anesthetic techniques and relevant risks discussed: GA  Invasive monitoring and risk discussed: No  Types:   Possibility and Risk of blood transfusion discussed: No  NPO instructions given:   Additional anesthetic preparation and risks discussed:   Needs early admission to pre-op area:   Other:     PAC Resident/NP Anesthesia Assessment:  Hilario Meeks is a 69 year old male scheduled to undergo Holmium Laser Enucleation of the Prostate with Chester Turner MD on 6/27/19 at  [facility] for " treatment of BPH with urinary retention.     He has the following specific operative considerations:      - Pt has had prior anesthetic. Type: MAC - no complications per pt  - Anesthesia considerations:  Refer to PAC assessment in anesthesia records  - Risk of PONV score = 2.  If > 2, anti-emetic intervention recommended.    CARDIAC: METS >4, rides bicycle daily      RCRI : No serious cardiac risks.  0.4% risk of major adverse cardiac event.     PULMONARY: YESENIA # of risks 2/8 = low risk     Never smoked, no asthma    GI: no GERD     RENAL/: BPH w/ urinary retention, has had catheter during past 10 weeks    ENDO: BMI 22, no DM    HEME/IMMUNE: VTE risk: 3%     CLL, not treated, under surveillance       ORTHO: Limited extenstion Neck ROM, no TMJ    Patient was discussed with Dr House. Patient is optimized and is acceptable candidate for the proposed procedure. No further diagnostic evaluation is needed.        Reviewed and Signed by PAC Mid-Level Provider/Resident  Mid-Level Provider/Resident: Radhika Patterson PA-C  Date: 6/19/19  Time: 0813    Attending Anesthesiologist Anesthesia Assessment:        Anesthesiologist:   Date:   Time:   Pass/Fail:   Disposition:     PAC Pharmacist Assessment:        Pharmacist:   Date:   Time:        Radhika Patterson PA-C

## 2019-06-21 ENCOUNTER — PATIENT OUTREACH (OUTPATIENT)
Dept: UROLOGY | Facility: CLINIC | Age: 69
End: 2019-06-21

## 2019-06-21 DIAGNOSIS — R39.9 LOWER URINARY TRACT SYMPTOMS: Primary | ICD-10-CM

## 2019-06-21 RX ORDER — CIPROFLOXACIN 500 MG/1
500 TABLET, FILM COATED ORAL 2 TIMES DAILY
Qty: 14 TABLET | Refills: 0 | Status: SHIPPED | OUTPATIENT
Start: 2019-06-24 | End: 2019-07-01

## 2019-06-21 NOTE — PROGRESS NOTES
Spoke with patient wife to let him know that Hilario needs to start Cipro on Monday in preparation for surgery next week. RX sent to pharmacy. Deanne Biggs RN

## 2019-06-24 LAB
BACTERIA SPEC CULT: ABNORMAL
Lab: ABNORMAL
SPECIMEN SOURCE: ABNORMAL

## 2019-06-27 ENCOUNTER — HOSPITAL ENCOUNTER (OUTPATIENT)
Facility: CLINIC | Age: 69
Discharge: HOME | End: 2019-06-28
Attending: UROLOGY | Admitting: UROLOGY
Payer: COMMERCIAL

## 2019-06-27 ENCOUNTER — ANESTHESIA (OUTPATIENT)
Dept: SURGERY | Facility: CLINIC | Age: 69
End: 2019-06-27
Payer: COMMERCIAL

## 2019-06-27 DIAGNOSIS — Z98.890 POST-OPERATIVE STATE: Primary | ICD-10-CM

## 2019-06-27 LAB
ABO + RH BLD: NORMAL
ABO + RH BLD: NORMAL
BLD GP AB SCN SERPL QL: NORMAL
BLOOD BANK CMNT PATIENT-IMP: NORMAL
GLUCOSE SERPL-MCNC: 84 MG/DL (ref 70–99)
HGB BLD-MCNC: 14 G/DL (ref 13.3–17.7)
SPECIMEN EXP DATE BLD: NORMAL

## 2019-06-27 PROCEDURE — 86850 RBC ANTIBODY SCREEN: CPT | Performed by: UROLOGY

## 2019-06-27 PROCEDURE — 25000566 ZZH SEVOFLURANE, EA 15 MIN: Performed by: UROLOGY

## 2019-06-27 PROCEDURE — 37000009 ZZH ANESTHESIA TECHNICAL FEE, EACH ADDTL 15 MIN: Performed by: UROLOGY

## 2019-06-27 PROCEDURE — 25800025 ZZH RX 258: Performed by: STUDENT IN AN ORGANIZED HEALTH CARE EDUCATION/TRAINING PROGRAM

## 2019-06-27 PROCEDURE — 82947 ASSAY GLUCOSE BLOOD QUANT: CPT | Performed by: ANESTHESIOLOGY

## 2019-06-27 PROCEDURE — 88305 TISSUE EXAM BY PATHOLOGIST: CPT | Mod: 26 | Performed by: UROLOGY

## 2019-06-27 PROCEDURE — 93010 ELECTROCARDIOGRAM REPORT: CPT | Performed by: INTERNAL MEDICINE

## 2019-06-27 PROCEDURE — 40000065 ZZH STATISTIC EKG NON-CHARGEABLE

## 2019-06-27 PROCEDURE — 36000062 ZZH SURGERY LEVEL 4 1ST 30 MIN - UMMC: Performed by: UROLOGY

## 2019-06-27 PROCEDURE — 25800030 ZZH RX IP 258 OP 636: Performed by: ANESTHESIOLOGY

## 2019-06-27 PROCEDURE — C1758 CATHETER, URETERAL: HCPCS | Performed by: UROLOGY

## 2019-06-27 PROCEDURE — 37000008 ZZH ANESTHESIA TECHNICAL FEE, 1ST 30 MIN: Performed by: UROLOGY

## 2019-06-27 PROCEDURE — 85018 HEMOGLOBIN: CPT | Performed by: ANESTHESIOLOGY

## 2019-06-27 PROCEDURE — 40000170 ZZH STATISTIC PRE-PROCEDURE ASSESSMENT II: Performed by: UROLOGY

## 2019-06-27 PROCEDURE — 27210794 ZZH OR GENERAL SUPPLY STERILE: Performed by: UROLOGY

## 2019-06-27 PROCEDURE — 86901 BLOOD TYPING SEROLOGIC RH(D): CPT | Performed by: UROLOGY

## 2019-06-27 PROCEDURE — 25000125 ZZHC RX 250: Performed by: NURSE ANESTHETIST, CERTIFIED REGISTERED

## 2019-06-27 PROCEDURE — 36000064 ZZH SURGERY LEVEL 4 EA 15 ADDTL MIN - UMMC: Performed by: UROLOGY

## 2019-06-27 PROCEDURE — 86900 BLOOD TYPING SEROLOGIC ABO: CPT | Performed by: UROLOGY

## 2019-06-27 PROCEDURE — 27211024 ZZHC OR SUPPLY OTHER OPNP: Performed by: UROLOGY

## 2019-06-27 PROCEDURE — 88305 TISSUE EXAM BY PATHOLOGIST: CPT | Performed by: UROLOGY

## 2019-06-27 PROCEDURE — 25000128 H RX IP 250 OP 636: Performed by: UROLOGY

## 2019-06-27 PROCEDURE — 36415 COLL VENOUS BLD VENIPUNCTURE: CPT | Performed by: ANESTHESIOLOGY

## 2019-06-27 PROCEDURE — 25000128 H RX IP 250 OP 636: Performed by: NURSE ANESTHETIST, CERTIFIED REGISTERED

## 2019-06-27 PROCEDURE — 71000014 ZZH RECOVERY PHASE 1 LEVEL 2 FIRST HR: Performed by: UROLOGY

## 2019-06-27 RX ORDER — CALCIUM CARBONATE 500 MG/1
1000 TABLET, CHEWABLE ORAL 4 TIMES DAILY PRN
Status: DISCONTINUED | OUTPATIENT
Start: 2019-06-27 | End: 2019-06-28 | Stop reason: HOSPADM

## 2019-06-27 RX ORDER — LEVOFLOXACIN 5 MG/ML
500 INJECTION, SOLUTION INTRAVENOUS EVERY 24 HOURS
Status: DISCONTINUED | OUTPATIENT
Start: 2019-06-27 | End: 2019-06-27 | Stop reason: HOSPADM

## 2019-06-27 RX ORDER — FENTANYL CITRATE 50 UG/ML
INJECTION, SOLUTION INTRAMUSCULAR; INTRAVENOUS PRN
Status: DISCONTINUED | OUTPATIENT
Start: 2019-06-27 | End: 2019-06-27

## 2019-06-27 RX ORDER — SODIUM CHLORIDE, SODIUM LACTATE, POTASSIUM CHLORIDE, CALCIUM CHLORIDE 600; 310; 30; 20 MG/100ML; MG/100ML; MG/100ML; MG/100ML
INJECTION, SOLUTION INTRAVENOUS CONTINUOUS
Status: DISCONTINUED | OUTPATIENT
Start: 2019-06-27 | End: 2019-06-27 | Stop reason: HOSPADM

## 2019-06-27 RX ORDER — ONDANSETRON 4 MG/1
4 TABLET, ORALLY DISINTEGRATING ORAL EVERY 30 MIN PRN
Status: DISCONTINUED | OUTPATIENT
Start: 2019-06-27 | End: 2019-06-27 | Stop reason: HOSPADM

## 2019-06-27 RX ORDER — OXYCODONE HYDROCHLORIDE 5 MG/1
5-10 TABLET ORAL
Status: DISCONTINUED | OUTPATIENT
Start: 2019-06-27 | End: 2019-06-28 | Stop reason: HOSPADM

## 2019-06-27 RX ORDER — ACETAMINOPHEN 325 MG/1
650 TABLET ORAL EVERY 6 HOURS PRN
Status: DISCONTINUED | OUTPATIENT
Start: 2019-06-27 | End: 2019-06-28 | Stop reason: HOSPADM

## 2019-06-27 RX ORDER — MEPERIDINE HYDROCHLORIDE 25 MG/ML
12.5 INJECTION INTRAMUSCULAR; INTRAVENOUS; SUBCUTANEOUS
Status: DISCONTINUED | OUTPATIENT
Start: 2019-06-27 | End: 2019-06-27 | Stop reason: HOSPADM

## 2019-06-27 RX ORDER — ATROPA BELLADONNA AND OPIUM 16.2; 3 MG/1; MG/1
30 SUPPOSITORY RECTAL 3 TIMES DAILY PRN
Status: DISCONTINUED | OUTPATIENT
Start: 2019-06-27 | End: 2019-06-28 | Stop reason: HOSPADM

## 2019-06-27 RX ORDER — FENTANYL CITRATE 50 UG/ML
25-50 INJECTION, SOLUTION INTRAMUSCULAR; INTRAVENOUS EVERY 5 MIN PRN
Status: DISCONTINUED | OUTPATIENT
Start: 2019-06-27 | End: 2019-06-27 | Stop reason: HOSPADM

## 2019-06-27 RX ORDER — LIDOCAINE 40 MG/G
CREAM TOPICAL
Status: DISCONTINUED | OUTPATIENT
Start: 2019-06-27 | End: 2019-06-28 | Stop reason: HOSPADM

## 2019-06-27 RX ORDER — PROPOFOL 10 MG/ML
INJECTION, EMULSION INTRAVENOUS PRN
Status: DISCONTINUED | OUTPATIENT
Start: 2019-06-27 | End: 2019-06-27

## 2019-06-27 RX ORDER — GLYCOPYRROLATE 0.2 MG/ML
INJECTION, SOLUTION INTRAMUSCULAR; INTRAVENOUS PRN
Status: DISCONTINUED | OUTPATIENT
Start: 2019-06-27 | End: 2019-06-27

## 2019-06-27 RX ORDER — LIDOCAINE 40 MG/G
CREAM TOPICAL
Status: DISCONTINUED | OUTPATIENT
Start: 2019-06-27 | End: 2019-06-27 | Stop reason: HOSPADM

## 2019-06-27 RX ORDER — KETOROLAC TROMETHAMINE 30 MG/ML
INJECTION, SOLUTION INTRAMUSCULAR; INTRAVENOUS PRN
Status: DISCONTINUED | OUTPATIENT
Start: 2019-06-27 | End: 2019-06-27

## 2019-06-27 RX ORDER — OXYCODONE HYDROCHLORIDE 5 MG/1
5 TABLET ORAL EVERY 4 HOURS PRN
Status: DISCONTINUED | OUTPATIENT
Start: 2019-06-27 | End: 2019-06-28 | Stop reason: HOSPADM

## 2019-06-27 RX ORDER — ONDANSETRON 2 MG/ML
INJECTION INTRAMUSCULAR; INTRAVENOUS PRN
Status: DISCONTINUED | OUTPATIENT
Start: 2019-06-27 | End: 2019-06-27

## 2019-06-27 RX ORDER — CEFTRIAXONE 1 G/1
1 INJECTION, POWDER, FOR SOLUTION INTRAMUSCULAR; INTRAVENOUS
Status: COMPLETED | OUTPATIENT
Start: 2019-06-27 | End: 2019-06-27

## 2019-06-27 RX ORDER — EPHEDRINE SULFATE 50 MG/ML
INJECTION, SOLUTION INTRAMUSCULAR; INTRAVENOUS; SUBCUTANEOUS PRN
Status: DISCONTINUED | OUTPATIENT
Start: 2019-06-27 | End: 2019-06-27

## 2019-06-27 RX ORDER — NALOXONE HYDROCHLORIDE 0.4 MG/ML
.1-.4 INJECTION, SOLUTION INTRAMUSCULAR; INTRAVENOUS; SUBCUTANEOUS
Status: DISCONTINUED | OUTPATIENT
Start: 2019-06-27 | End: 2019-06-28 | Stop reason: HOSPADM

## 2019-06-27 RX ORDER — ONDANSETRON 2 MG/ML
4 INJECTION INTRAMUSCULAR; INTRAVENOUS EVERY 30 MIN PRN
Status: DISCONTINUED | OUTPATIENT
Start: 2019-06-27 | End: 2019-06-27 | Stop reason: HOSPADM

## 2019-06-27 RX ORDER — ONDANSETRON 2 MG/ML
4 INJECTION INTRAMUSCULAR; INTRAVENOUS EVERY 6 HOURS PRN
Status: DISCONTINUED | OUTPATIENT
Start: 2019-06-27 | End: 2019-06-28 | Stop reason: HOSPADM

## 2019-06-27 RX ORDER — NALOXONE HYDROCHLORIDE 0.4 MG/ML
.1-.4 INJECTION, SOLUTION INTRAMUSCULAR; INTRAVENOUS; SUBCUTANEOUS
Status: DISCONTINUED | OUTPATIENT
Start: 2019-06-27 | End: 2019-06-27 | Stop reason: HOSPADM

## 2019-06-27 RX ORDER — ONDANSETRON 4 MG/1
4 TABLET, ORALLY DISINTEGRATING ORAL EVERY 6 HOURS PRN
Status: DISCONTINUED | OUTPATIENT
Start: 2019-06-27 | End: 2019-06-28 | Stop reason: HOSPADM

## 2019-06-27 RX ORDER — LIDOCAINE HYDROCHLORIDE 20 MG/ML
INJECTION, SOLUTION INFILTRATION; PERINEURAL PRN
Status: DISCONTINUED | OUTPATIENT
Start: 2019-06-27 | End: 2019-06-27

## 2019-06-27 RX ADMIN — Medication 5 MG: at 19:31

## 2019-06-27 RX ADMIN — SODIUM CHLORIDE 3000 ML: 900 IRRIGANT IRRIGATION at 23:47

## 2019-06-27 RX ADMIN — SODIUM CHLORIDE, POTASSIUM CHLORIDE, SODIUM LACTATE AND CALCIUM CHLORIDE: 600; 310; 30; 20 INJECTION, SOLUTION INTRAVENOUS at 18:21

## 2019-06-27 RX ADMIN — SODIUM CHLORIDE 3000 ML: 900 IRRIGANT IRRIGATION at 22:24

## 2019-06-27 RX ADMIN — CEFTRIAXONE 1 G: 1 INJECTION, POWDER, FOR SOLUTION INTRAMUSCULAR; INTRAVENOUS at 18:36

## 2019-06-27 RX ADMIN — FENTANYL CITRATE 25 MCG: 50 INJECTION, SOLUTION INTRAMUSCULAR; INTRAVENOUS at 19:10

## 2019-06-27 RX ADMIN — GLYCOPYRROLATE 0.1 MG: 0.2 INJECTION, SOLUTION INTRAMUSCULAR; INTRAVENOUS at 19:27

## 2019-06-27 RX ADMIN — SODIUM CHLORIDE, POTASSIUM CHLORIDE, SODIUM LACTATE AND CALCIUM CHLORIDE: 600; 310; 30; 20 INJECTION, SOLUTION INTRAVENOUS at 19:37

## 2019-06-27 RX ADMIN — ONDANSETRON 4 MG: 2 INJECTION INTRAMUSCULAR; INTRAVENOUS at 19:37

## 2019-06-27 RX ADMIN — LEVOFLOXACIN 500 MG: 5 INJECTION, SOLUTION INTRAVENOUS at 18:36

## 2019-06-27 RX ADMIN — GLYCOPYRROLATE 0.1 MG: 0.2 INJECTION, SOLUTION INTRAMUSCULAR; INTRAVENOUS at 19:21

## 2019-06-27 RX ADMIN — PROPOFOL 200 MG: 10 INJECTION, EMULSION INTRAVENOUS at 18:25

## 2019-06-27 RX ADMIN — Medication 5 MG: at 19:34

## 2019-06-27 RX ADMIN — LIDOCAINE HYDROCHLORIDE 60 MG: 20 INJECTION, SOLUTION INFILTRATION; PERINEURAL at 18:25

## 2019-06-27 RX ADMIN — MIDAZOLAM 2 MG: 1 INJECTION INTRAMUSCULAR; INTRAVENOUS at 18:20

## 2019-06-27 ASSESSMENT — ACTIVITIES OF DAILY LIVING (ADL)
RETIRED_COMMUNICATION: 0-->UNDERSTANDS/COMMUNICATES WITHOUT DIFFICULTY
DRESS: 0-->INDEPENDENT
BATHING: 0-->INDEPENDENT
TOILETING: 1-->ASSISTIVE EQUIPMENT
RETIRED_EATING: 0-->INDEPENDENT
AMBULATION: 0-->INDEPENDENT
TRANSFERRING: 0-->INDEPENDENT
FALL_HISTORY_WITHIN_LAST_SIX_MONTHS: NO
COGNITION: 0 - NO COGNITION ISSUES REPORTED
SWALLOWING: 0-->SWALLOWS FOODS/LIQUIDS WITHOUT DIFFICULTY

## 2019-06-27 ASSESSMENT — MIFFLIN-ST. JEOR: SCORE: 1614

## 2019-06-27 NOTE — OR NURSING
Delay to OR. Pt and family frustrated. Periop staff and management talked/listened to concerns. Dr. Johnny montoya.

## 2019-06-28 VITALS
TEMPERATURE: 97.5 F | SYSTOLIC BLOOD PRESSURE: 132 MMHG | WEIGHT: 170.64 LBS | HEART RATE: 47 BPM | RESPIRATION RATE: 14 BRPM | BODY MASS INDEX: 21.9 KG/M2 | HEIGHT: 74 IN | DIASTOLIC BLOOD PRESSURE: 75 MMHG | OXYGEN SATURATION: 99 %

## 2019-06-28 LAB
ANION GAP SERPL CALCULATED.3IONS-SCNC: 4 MMOL/L (ref 3–14)
BUN SERPL-MCNC: 9 MG/DL (ref 7–30)
CALCIUM SERPL-MCNC: 7.7 MG/DL (ref 8.5–10.1)
CHLORIDE SERPL-SCNC: 104 MMOL/L (ref 94–109)
CO2 SERPL-SCNC: 30 MMOL/L (ref 20–32)
CREAT SERPL-MCNC: 0.78 MG/DL (ref 0.66–1.25)
ERYTHROCYTE [DISTWIDTH] IN BLOOD BY AUTOMATED COUNT: 13.1 % (ref 10–15)
GFR SERPL CREATININE-BSD FRML MDRD: >90 ML/MIN/{1.73_M2}
GLUCOSE SERPL-MCNC: 83 MG/DL (ref 70–99)
HCT VFR BLD AUTO: 40.3 % (ref 40–53)
HGB BLD-MCNC: 13.8 G/DL (ref 13.3–17.7)
INTERPRETATION ECG - MUSE: NORMAL
MCH RBC QN AUTO: 32.6 PG (ref 26.5–33)
MCHC RBC AUTO-ENTMCNC: 34.2 G/DL (ref 31.5–36.5)
MCV RBC AUTO: 95 FL (ref 78–100)
PLATELET # BLD AUTO: 111 10E9/L (ref 150–450)
POTASSIUM SERPL-SCNC: 4.1 MMOL/L (ref 3.4–5.3)
RBC # BLD AUTO: 4.23 10E12/L (ref 4.4–5.9)
SODIUM SERPL-SCNC: 138 MMOL/L (ref 133–144)
WBC # BLD AUTO: 10.1 10E9/L (ref 4–11)

## 2019-06-28 PROCEDURE — 25800025 ZZH RX 258: Performed by: STUDENT IN AN ORGANIZED HEALTH CARE EDUCATION/TRAINING PROGRAM

## 2019-06-28 PROCEDURE — 36415 COLL VENOUS BLD VENIPUNCTURE: CPT | Performed by: STUDENT IN AN ORGANIZED HEALTH CARE EDUCATION/TRAINING PROGRAM

## 2019-06-28 PROCEDURE — 85027 COMPLETE CBC AUTOMATED: CPT | Performed by: STUDENT IN AN ORGANIZED HEALTH CARE EDUCATION/TRAINING PROGRAM

## 2019-06-28 PROCEDURE — 80048 BASIC METABOLIC PNL TOTAL CA: CPT | Performed by: STUDENT IN AN ORGANIZED HEALTH CARE EDUCATION/TRAINING PROGRAM

## 2019-06-28 RX ORDER — CIPROFLOXACIN 500 MG/1
500 TABLET, FILM COATED ORAL 2 TIMES DAILY
Qty: 6 TABLET | Refills: 0 | Status: SHIPPED | OUTPATIENT
Start: 2019-06-28 | End: 2022-03-02

## 2019-06-28 RX ADMIN — SODIUM CHLORIDE 3000 ML: 900 IRRIGANT IRRIGATION at 01:03

## 2019-06-28 RX ADMIN — SODIUM CHLORIDE 3000 ML: 900 IRRIGANT IRRIGATION at 05:54

## 2019-06-28 RX ADMIN — SODIUM CHLORIDE 3000 ML: 900 IRRIGANT IRRIGATION at 02:37

## 2019-06-28 RX ADMIN — SODIUM CHLORIDE 3000 ML: 900 IRRIGANT IRRIGATION at 04:32

## 2019-06-28 NOTE — PROGRESS NOTES
PACU to Inpatient Nursing Handoff    Patient Hilario Meeks is a 69 year old male who speaks English.   Procedure Procedure(s):  Holmium Laser Enucleation of the Prostate   Surgeon(s) Primary: Chester Turner MD  Resident - Assisting: Maranda Tolbert MD     No Known Allergies    Isolation  No active isolations     Past Medical History   has a past medical history of BPH (benign prostatic hyperplasia).    Anesthesia General   Dermatome Level     Preop Meds Not applicable   Nerve block Not applicable   Intraop Meds fentanyl (Sublimaze): 25 mcg total  ondansetron (Zofran): last given at 1937   Local Meds No   Antibiotics Rocephin 1 G at 1836 and Levaquin 500mg at 1846     Pain Patient Currently in Pain: denies   PACU meds  Not applicable   PCA / epidural No   Capnography     Telemetry ECG Rhythm: Sinus bradycardia   Inpatient Telemetry Monitor Ordered? No        Labs Glucose Lab Results   Component Value Date    GLC 84 06/27/2019       Hgb Lab Results   Component Value Date    HGB 14.0 06/27/2019       INR No results found for: INR   PACU Imaging Not applicable     Wound/Incision     CMS        Equipment Not applicable   Other LDA       IV Access Peripheral IV 06/27/19 Right;Dorsal Hand (Active)   Site Assessment WDL 6/27/2019  8:00 PM   Line Status Infusing 6/27/2019  8:00 PM   Phlebitis Scale 0-->no symptoms 6/27/2019  8:00 PM   Infiltration Scale 0 6/27/2019  8:00 PM   Extravasation? No 6/27/2019 11:40 AM   Dressing Intervention New dressing  6/27/2019 11:40 AM   Number of days: 0      Blood Products Not applicable EBL 0 mL   Intake/Output Date 06/27/19 0700 - 06/28/19 0659   Shift 7947-4608 6935-5110 5863-5475 24 Hour Total   INTAKE   I.V.  1000  1000   Shift Total(mL/kg)  1000(12.92)  1000(12.92)   OUTPUT   Urine 225 2700  2925   Shift Total(mL/kg) 225(2.91) 2700(34.88)  2925(37.79)   Weight (kg) 77.4 77.4 77.4 77.4      Drains / Li Urethral Catheter (Active)   Tube Description UTV 6/27/2019  8:00 PM    Collection Container Standard 6/27/2019  8:00 PM   Securement Method Leg strap 6/27/2019  8:00 PM   Rationale for Continued Use /GI/GYN Pelvic Procedure 6/27/2019  8:00 PM   Urine Output 2700 mL 6/27/2019  8:13 PM   Number of days: 0      Time of void PreOp Void Prior to Procedure: (urinary catheter in place) (06/27/19 1123)    PostOp Voided (mL): 225 mL (06/27/19 1326)    Diapered? No   Bladder Scan     PO    crackers     Vitals    B/P: (!) 154/94  T: 97.8  F (36.6  C)    Temp src: Oral  P:  Pulse: 55 (06/27/19 2015)    Heart Rate: 55 (06/27/19 2015)     R: 14  O2:  SpO2: 100 %    O2 Device: None (Room air) (06/27/19 2015)    Oxygen Delivery: 5 LPM (06/27/19 2000)         Family/support present significant other   Patient belongings     Patient transported on cart   DC meds/scripts (obs/outpt) Not applicable   Inpatient Pain Meds Released? Yes       Special needs/considerations None   Tasks needing completion None       Lisbet Valverde RN  ASCOM [id number]

## 2019-06-28 NOTE — OP NOTE
Operative Report  6/27/2019    PREOPERATIVE DIAGNOSIS:  Prostatic hypertrophy with urinary retention  POSTOPERATIVE DIAGNOSIS: Same as above    PROCEDURE PERFORMED: Holmium laser enucleation of the prostate  ATTENDING SURGEON: Chester Turner MD  RESIDENT SURGEON: Maranda Tolbert MD    FINDINGS: Approximately 60 gm prostate with trilobar hypertrophy. Bladder with severe trabeculation  ANESTHESIA: General  INTRAVENOUS FLUIDS: See anesthesia records  ESTIMATED BLOOD LOSS: Less than 100 ml   SPECIMENS: Prostate adenoma  DRAINS: 22-Thai 3-way catheter with 60 ml in balloon     INDICATIONS FOR PROCEDURE: Hilario Meeks is a(n) 69 year old male who was seen in consultation for urinary retention. He has elected treatment with laser enucleation. Prostate volume estimated to be 60 grams. His digital rectal exam was unremarkable.  After discussion of the risks, benefits and alternatives of the procedure, the patient agreed to proceed with the above stated procdure.    DESCRIPTION OF PROCEDURE: After obtaining informed consent, the patient was taken to the operating room and placed under general anesthesia.  He was repositioned in dorsal lithotomy making sure that the legs were positioned and padded safely.  He was then prepped and draped in standard sterile fashion.  Culture directed antibiotics were administered and bilateral sequential compression devices were placed.  A time out was performed confirming the appropriate patient identity and planned procedure.     The procedure was begun by generously lubricating the urethra.  The urethra was noted to be patent and did not require use of the polly urethrotome in order to place the 26F outer sheath.  The outer sheath was placed over a deflecting obturator and we then looked the scope through the posterior urethra and into the bladder.  The prostate was noted to have a trilobar configuration.  At this point we inserted the 550 micron holmium laser through the 7F laser  catheter.    We began enucleation by making paired incisions at the bladder neck at 5 and 7 oc'clock.  We carried these down to the apex proximal to the veromontanum and then freed the median lobe pushing it up into the bladder and transecting it off the bladder neck.  We then proceeded with a bottom up  approach, electing to enucleate the left lobe first.  We performed the majority of the dissection at 40 hodge making sure to keep the energy at 40 hodge or lower when working near the apex.  The capsular planes on this side were noted to be good.  Once the majority of the lobe was dissected we isolated the mucosal strip and transected it with the laser at 40 hodge.  We then proceeded to take down the remaining lateral and posterior attachments and pushed the lobe into the bladder.  We then enucleated the contralateral lobe using a top down approach.  Capsular planes were noted to be good on this side.  The remaining bladder neck attachments were identified and transected, freeing the lobe up entirely. Total enucleation time was 32 minutes.    At this point there was a moderate amount of bleeding and approximately 5 minutes were spent identifying bleeding vessels in the fossa and coagulating them with the laser.  Once hemostasis was adequate we switched from the laser resectoscope to the 26F offset telescope with the Piranha morcellation device.  We added an extra inflow to distend the bladder.  The blades were adjusted and set at a rate of 1500 RPM.  We proceeded with morcellation making sure that we morcellated the entirety of the adenoma.  Total morcellation time was 10 minutes.     We then switched back to the laser resectoscope one final time to ensure that no residual tissue was left in the bladder.  We also confirmed that the bladder was unharmed from morcellation and that both ureteral orifices were unharmed during the procedure.  We inspected the fossa and obtained final hemostasis.   We looked the scope out  noting that the sphincter was completely intact without evidence of thermal or mechanical injury.     We lubricated the urethra again and passed a 22F 3-way piper catheter over a catheter guide.  The urine was noted to be pink.  We filled the balloon with 60 ml of sterile water and did not place the catheter on traction.  The patient was woken from anesthesia and taken to the recovery room in stable condition.     The specimen was weighed and found to be approximately 32 g.     POSTOPERATIVE PLAN:   -We will monitor the patient post operatively on continuous bladder irrigation for signs of ongling bleeding.  If clear we will consider discharge with piper removal as an outpatient.  If continues to have ongoing bleeding concerning for clot formation without bladder irrigation will plan to admit for observation    Chester Turner

## 2019-06-28 NOTE — PLAN OF CARE
Pt off CBI, at 8:00 not voided. Up independent in romero. Tolerated reg diet. 9:30 voided 500 dark red urine, scanned for 11 ml. Pt discharged to home at 12:10. Alert, denies pain. Teaching done on meds, follow up appointments. Pt & wife understand all teaching.

## 2019-06-28 NOTE — ANESTHESIA CARE TRANSFER NOTE
Patient: Hilario Meeks    Procedure(s):  Holmium Laser Enucleation of the Prostate    Diagnosis: Urinary Retention  Diagnosis Additional Information: No value filed.    Anesthesia Type:   No value filed.     Note:  Airway :Face Mask  Patient transferred to:PACU  Handoff Report: Identifed the Patient, Identified the Reponsible Provider, Reviewed the pertinent medical history, Discussed the surgical course, Reviewed Intra-OP anesthesia mangement and issues during anesthesia, Set expectations for post-procedure period and Allowed opportunity for questions and acknowledgement of understanding      Vitals: (Last set prior to Anesthesia Care Transfer)    CRNA VITALS  6/27/2019 1920 - 6/27/2019 1959 6/27/2019             Resp Rate (observed):  1  (Abnormal)                 Electronically Signed By: MAHESH Sheffield CRNA  June 27, 2019  7:59 PM

## 2019-06-28 NOTE — ANESTHESIA POSTPROCEDURE EVALUATION
Anesthesia POST Procedure Evaluation    Patient: Hilario Meeks   MRN:     [id number] Gender:   male   Age:    69 year old :      1950        Preoperative Diagnosis: Urinary Retention   Procedure(s):  Holmium Laser Enucleation of the Prostate   Postop Comments: No value filed.       Anesthesia Type:  General  No value filed.    Reportable Event: NO     PAIN: Uncomplicated   Sign Out status: Comfortable, Well controlled pain     PONV: No PONV   Sign Out status:  No Nausea or Vomiting     Neuro/Psych: Uneventful perioperative course   Sign Out Status: Preoperative baseline; Age appropriate mentation     Airway/Resp.: Uneventful perioperative course   Sign Out Status: Non labored breathing, age appropriate RR; Resp. Status within EXPECTED Parameters     CV: Uneventful perioperative course   Sign Out status: Appropriate BP and perfusion indices; Appropriate HR/Rhythm     Disposition:   Sign Out in:  PACU  Disposition:  Phase II; Home  Recovery Course: Uneventful  Follow-Up: Not required           Last Anesthesia Record Vitals:  CRNA VITALS  2019 1920 - 2019 2020      2019             Resp Rate (observed):  1  (Abnormal)           Last PACU Vitals:  Vitals Value Taken Time   /95 2019  8:30 PM   Temp     Pulse 49 2019  8:30 PM   Resp 11 2019  8:40 PM   SpO2 99 % 2019  8:40 PM   Temp src     NIBP     Pulse     SpO2     Resp     Temp     Ht Rate     Temp 2     Vitals shown include unvalidated device data.      Electronically Signed By: Chaitanya Lizama DO, 2019, 8:41 PM

## 2019-06-28 NOTE — BRIEF OP NOTE
[facility], [place]    Brief Operative Note    Pre-operative diagnosis: Urinary Retention  Post-operative diagnosis * No post-op diagnosis entered *  Procedure: Procedure(s):  Holmium Laser Enucleation of the Prostate  Surgeon: Surgeon(s) and Role:     * Chester Turner MD - Primary     * Maranda Tolbert MD - Resident - Assisting  Anesthesia: General   Estimated blood loss: Less than 50 ml  Drains: 22 Fr 3 way  Specimens:   ID Type Source Tests Collected by Time Destination   A :  Tissue Prostate SURGICAL PATHOLOGY EXAM Chester Turner MD 6/27/2019  6:49 PM      Findings:   None.  Complications: None.  Implants:  * No implants in log *

## 2019-06-28 NOTE — PROGRESS NOTES
"Urology Daily Progress Note    24 hour events/Subjective:     - NAEO   - No pain complaints    O:  Vitals: /75 (BP Location: Right arm)   Pulse 50   Temp 97.5  F (36.4  C) (Oral)   Resp 16   Ht 1.88 m (6' 2.02\")   Wt 77.4 kg (170 lb 10.2 oz)   SpO2 98%   BMI 21.90 kg/m    General: Alert, interactive, in NAD  Resp: Non-labored breathing on RA  Abdomen: Soft, non-tender  Ext: Warm and well perfused   Li/Urostomy: Watermelon on slow drip    I/O last 3 completed shifts:  In: 1100 [I.V.:1100]  Out: 7825 [Urine:7825] - Last 24 hours    Labs/Imaging  Heme:  Recent Labs   Lab 06/28/19  0530 06/27/19  1138   WBC 10.1  --    HGB 13.8 14.0   *  --      Chem:  Recent Labs   Lab 06/28/19  0530   POTASSIUM 4.1   CR 0.78       Assessment/Plan  Assessment/Plan  POD#1 from [facility] recovering well.    - Urinate at least 150 ccs twice before discharge  - Cipro through Monday  - Follow up with Dr. Turner as scheduled    Plan to be confirmed with Dr. Turner      --    Maranda Tolbert MD  Urology Resident           Contacting the Urology Team     Please use the following job codes to reach the Urology Team. Note that you must use an in house phone and that job codes cannot receive text pages.     On weekdays, dial [phone] (or star-star-star 777 on the new [employer] telephones) then 0816 to reach the Adult Urology resident or PA on call    On weekdays, dial [phone] (or star-star-star 777 on the new [employer] telephones) then 0818 to reach the Pediatric Urology resident    On weeknights and weekends, dial [phone] (or star-star-star 777 on the new [employer] telephones) then [phone] to reach the Urology resident on call (for both Adult and Pediatrics)            "

## 2019-06-28 NOTE — PLAN OF CARE
Pt arrived to unit at 2120.    VSS except baseline bradycardia. Pt asymptomatic. Alert and oriented x4. Lung sounds clear. Denies SOB. Denies pain. Denies any n/t. Regular diet, bowel sounds active in all 4 quadrants. LBM 6/27 before surgery.Tolerated food well, denied CBI running at fast to moderate speed w/ light pink output. No clotting noted in piper output. Surgical dressing CDI, no drainage noted. Pt did not get OOB this shift. R PIV. Observation status (see note for goals). Call light within reach, able to make needs known.     Capno applied at 2330. No capnography machines available upon pt arriving to floor. Charge nurse contacted hospitals, no extra capno machines on unit. Capno applied when machine available.     Piper removed by urology in AM. Encouraged fluids.

## 2019-07-01 ENCOUNTER — COMMUNICATION - HEALTHEAST (OUTPATIENT)
Dept: INTERNAL MEDICINE | Facility: CLINIC | Age: 69
End: 2019-07-01

## 2019-07-01 ENCOUNTER — PATIENT OUTREACH (OUTPATIENT)
Dept: UROLOGY | Facility: CLINIC | Age: 69
End: 2019-07-01

## 2019-07-01 LAB — COPATH REPORT: NORMAL

## 2019-07-01 NOTE — PROGRESS NOTES
"POST OP. Left message for patient to return my call. He also needs a letter for canceling his flight.     Hilario Meeks,    How are you doing after your surgical procedure with Dr. Turner?\" Wonderful, I can pee standing up\"    Any fever, chills, nausea or vomiting? no    How is your appetite? \"too good\"    Are you drinking enough fluids? \"yes\"    Have you had a bowel movement since you have been home? \"yes, no problems\"    Are you having any difficulties with urination? \"no\"    Is your urine clear yellow? \"mostly, a little pink\"    Are you having any pain?  \"denies      Do you have any questions or concerns?  \"no, not really\"    We have your post-operative appointment scheduled for August 13th at 9:00am with Dr. Turner.    Please feel free to reply via [employer] or contact the clinic.  [phone], option #3 to speak to the nurse      "

## 2019-07-02 ENCOUNTER — AMBULATORY - HEALTHEAST (OUTPATIENT)
Dept: INTERNAL MEDICINE | Facility: CLINIC | Age: 69
End: 2019-07-02

## 2019-07-02 DIAGNOSIS — L57.0 ACTINIC KERATOSIS: ICD-10-CM

## 2019-07-26 ENCOUNTER — RECORDS - HEALTHEAST (OUTPATIENT)
Dept: ADMINISTRATIVE | Facility: OTHER | Age: 69
End: 2019-07-26

## 2019-07-31 ENCOUNTER — PRE VISIT (OUTPATIENT)
Dept: UROLOGY | Facility: CLINIC | Age: 69
End: 2019-07-31

## 2019-07-31 PROBLEM — R16.1 SPLENOMEGALY: Status: ACTIVE | Noted: 2019-04-25

## 2019-07-31 PROBLEM — D47.2 MGUS (MONOCLONAL GAMMOPATHY OF UNKNOWN SIGNIFICANCE): Status: ACTIVE | Noted: 2019-04-25

## 2019-07-31 PROBLEM — C91.10 CHRONIC LYMPHOCYTIC LEUKEMIA (H): Status: ACTIVE | Noted: 2019-04-25

## 2019-07-31 NOTE — TELEPHONE ENCOUNTER
Reason for Visit: 6 week post-op  Holmium laser enucleation of the prostate (6/27)    Diagnosis: Urinary retention    Orders/Procedures/Records: Records available    Contact Patient: N/A    Rooming Requirements: Normal      Frannie Garrett  07/31/19  10:23 AM

## 2019-08-13 ENCOUNTER — OFFICE VISIT (OUTPATIENT)
Dept: UROLOGY | Facility: CLINIC | Age: 69
End: 2019-08-13
Payer: COMMERCIAL

## 2019-08-13 ENCOUNTER — RECORDS - HEALTHEAST (OUTPATIENT)
Dept: ADMINISTRATIVE | Facility: OTHER | Age: 69
End: 2019-08-13

## 2019-08-13 VITALS
DIASTOLIC BLOOD PRESSURE: 90 MMHG | SYSTOLIC BLOOD PRESSURE: 148 MMHG | HEART RATE: 110 BPM | BODY MASS INDEX: 21.82 KG/M2 | HEIGHT: 74 IN | WEIGHT: 170 LBS

## 2019-08-13 DIAGNOSIS — R33.9 URINARY RETENTION: Primary | ICD-10-CM

## 2019-08-13 LAB
APPEARANCE UR: CLEAR
BILIRUB UR QL: NORMAL
COLOR UR: YELLOW
GLUCOSE URINE: NORMAL MG/DL
HGB UR QL: NORMAL
KETONES UR QL: NORMAL MG/DL
LEUKOCYTE ESTERASE URINE: NORMAL
NITRITE UR QL STRIP: NORMAL
PH UR STRIP: 7 PH (ref 5–7)
PROTEIN ALBUMIN URINE: NORMAL MG/DL
SOURCE: NORMAL
SP GR UR STRIP: 1.01 (ref 1–1.03)
UROBILINOGEN UR QL STRIP: 0.2 EU/DL (ref 0.2–1)

## 2019-08-13 ASSESSMENT — PAIN SCALES - GENERAL: PAINLEVEL: NO PAIN (0)

## 2019-08-13 ASSESSMENT — MIFFLIN-ST. JEOR: SCORE: 1610.86

## 2019-08-13 NOTE — PATIENT INSTRUCTIONS
Please get your PSA drawn before leaving today.    Please schedule a follow up appointment with Dr. Turner in one year.      It was a pleasure meeting with you today.  Thank you for allowing me and my team the privilege of caring for you today.  YOU are the reason we are here, and I truly hope we provided you with the excellent service you deserve.  Please let us know if there is anything else we can do for you so that we can be sure you are leaving completely satisfied with your care experience.

## 2019-08-13 NOTE — PROGRESS NOTES
"  Urology Clinic    Chester Turner MD  Date of Service: 2019     Name: Hilario Meeks  MRN: [id number]  Age: 69 year old  : 1950  Referring provider: Matthew Hopper     Assessment and Plan:  Assessment:  Hilario Meeks  is a 69 year old male with a history of BPH with urinary retention. He underwent holmium laser enucleation of the prostate approximately 6 weeks ago. Reviewed the results of the biopsy being unremarkable for prostate cancer. Reminded him that he will need to continue with annual PSAs.     Plan:  - Urinalysis today was normal.   - Previous PSA was borderline at 3.14 on 2019, he will repeat today.      ______________________________________________________________________    HPI  Hilario Meeks is a 69 year old male with a history of BPH with urinary retention. He underwent holmium laser enucleation of the prostate approximately 6 weeks ago.     32 gms of tissue were removed.  Pathology was benign hyperplastic prostate tissue.    Today he notes he is doing well. Reports his stream is much stronger/consistent and he is urinating less frequently. He does have some nocturia but attributes this to a habit formed with working previous night shifts, not bothersome.    Denies dribbling, leaking or dysuria. He did have some bleeding immediatly after surgery continuing for one week. However, currently there is no hematuria.     Uroflow/Post Void Residual  PVR 10 mL    AUA SS 1/0    Review of Systems:   Pertinent items are noted in HPI or as below, remainder of complete ROS is negative.      Physical Exam:   Patient is a 69 year old  male   Vitals: Blood pressure (!) 148/90, pulse 110, height 1.88 m (6' 2\"), weight 77.1 kg (170 lb).  Notable Findings on Exam: Well-nourished male in no apparent distress     Laboratory:   I personally reviewed all applicable laboratory data and went over findings with patient  Significant for:  Surgical pathology 19:  SPECIMEN(S):   Prostate " transurethral resection (TUR)     FINAL DIAGNOSIS:   Prostate transurethral resection (TUR):   - Benign hyperplastic prostate tissue     CBC RESULTS:  Recent Labs   Lab Test 06/28/19  0530 06/27/19  1138   WBC 10.1  --    HGB 13.8 14.0   *  --       BMP RESULTS:  Recent Labs   Lab Test 06/28/19  0530 06/27/19  1138     --    POTASSIUM 4.1  --    CHLORIDE 104  --    CO2 30  --    ANIONGAP 4  --    GLC 83 84   BUN 9  --    CR 0.78  --    GFRESTIMATED >90  --    GFRESTBLACK >90  --    MATHEW 7.7*  --        Scribe Disclosure:  I, Shirley Singh, am serving as a scribe to document services personally performed by Chester Turner MD at this visit, based upon the provider's statements to me. All documentation has been reviewed by the aforementioned provider prior to being entered into the official medical record.

## 2019-08-13 NOTE — LETTER
2019       RE: Hilario Meeks  [address]  [address]  [place] [address]     Dear Colleague,    Thank you for referring your patient, Hilario Meeks, to the [facility] UROLOGY AND INST FOR PROSTATE AND UROLOGIC CANCERS at [facility]. Please see a copy of my visit note below.      Urology Clinic    Chester Turner MD  Date of Service: 2019     Name: Hilario Meeks  MRN: [id number]  Age: 69 year old  : 1950  Referring provider: Matthew Hopper     Assessment and Plan:  Assessment:  Hilario Meeks  is a 69 year old male with a history of BPH with urinary retention. He underwent holmium laser enucleation of the prostate approximately 6 weeks ago. Reviewed the results of the biopsy being unremarkable for prostate cancer. Reminded him that he will need to continue with annual PSAs.     Plan:  - Urinalysis today was normal.   - Previous PSA was borderline at 3.14 on 2019, he will repeat today.      ______________________________________________________________________    HPI  Hilario Meeks is a 69 year old male with a history of BPH with urinary retention. He underwent holmium laser enucleation of the prostate approximately 6 weeks ago.     32 gms of tissue were removed.  Pathology was benign hyperplastic prostate tissue.    Today he notes he is doing well. Reports his stream is much stronger/consistent and he is urinating less frequently. He does have some nocturia but attributes this to a habit formed with working previous night shifts, not bothersome.    Denies dribbling, leaking or dysuria. He did have some bleeding immediatly after surgery continuing for one week. However, currently there is no hematuria.     Uroflow/Post Void Residual  PVR 10 mL    AUA SS 1/0    Review of Systems:   Pertinent items are noted in HPI or as below, remainder of complete ROS is negative.      Physical Exam:   Patient is a 69 year old  male   Vitals:  "Blood pressure (!) 148/90, pulse 110, height 1.88 m (6' 2\"), weight 77.1 kg (170 lb).  Notable Findings on Exam: Well-nourished male in no apparent distress     Laboratory:   I personally reviewed all applicable laboratory data and went over findings with patient  Significant for:  Surgical pathology 6/27/19:  SPECIMEN(S):   Prostate transurethral resection (TUR)     FINAL DIAGNOSIS:   Prostate transurethral resection (TUR):   - Benign hyperplastic prostate tissue     CBC RESULTS:  Recent Labs   Lab Test 06/28/19  0530 06/27/19  1138   WBC 10.1  --    HGB 13.8 14.0   *  --       BMP RESULTS:  Recent Labs   Lab Test 06/28/19  0530 06/27/19  1138     --    POTASSIUM 4.1  --    CHLORIDE 104  --    CO2 30  --    ANIONGAP 4  --    GLC 83 84   BUN 9  --    CR 0.78  --    GFRESTIMATED >90  --    GFRESTBLACK >90  --    MATHEW 7.7*  --        Scribe Disclosure:  I, Shirley Singh, am serving as a scribe to document services personally performed by Chester Turner MD at this visit, based upon the provider's statements to me. All documentation has been reviewed by the aforementioned provider prior to being entered into the official medical record.         Again, thank you for allowing me to participate in the care of your patient.      Sincerely,    Chester Turner MD      "

## 2019-09-16 ENCOUNTER — COMMUNICATION - HEALTHEAST (OUTPATIENT)
Dept: ONCOLOGY | Facility: CLINIC | Age: 69
End: 2019-09-16

## 2019-11-06 ENCOUNTER — AMBULATORY - HEALTHEAST (OUTPATIENT)
Dept: INFUSION THERAPY | Facility: CLINIC | Age: 69
End: 2019-11-06

## 2019-11-06 ENCOUNTER — OFFICE VISIT - HEALTHEAST (OUTPATIENT)
Dept: ONCOLOGY | Facility: CLINIC | Age: 69
End: 2019-11-06

## 2019-11-06 DIAGNOSIS — D47.2 MGUS (MONOCLONAL GAMMOPATHY OF UNKNOWN SIGNIFICANCE): ICD-10-CM

## 2019-11-06 DIAGNOSIS — C91.10 CLL (CHRONIC LYMPHOCYTIC LEUKEMIA) (H): ICD-10-CM

## 2019-11-06 LAB
ALBUMIN SERPL-MCNC: 3.4 G/DL (ref 3.5–5)
ALP SERPL-CCNC: 61 U/L (ref 45–120)
ALT SERPL W P-5'-P-CCNC: 17 U/L (ref 0–45)
ANION GAP SERPL CALCULATED.3IONS-SCNC: 4 MMOL/L (ref 5–18)
AST SERPL W P-5'-P-CCNC: 20 U/L (ref 0–40)
BASOPHILS # BLD AUTO: 0 THOU/UL (ref 0–0.2)
BASOPHILS NFR BLD AUTO: 0 % (ref 0–2)
BILIRUB SERPL-MCNC: 0.8 MG/DL (ref 0–1)
BUN SERPL-MCNC: 16 MG/DL (ref 8–22)
CALCIUM SERPL-MCNC: 8.4 MG/DL (ref 8.5–10.5)
CHLORIDE BLD-SCNC: 100 MMOL/L (ref 98–107)
CO2 SERPL-SCNC: 30 MMOL/L (ref 22–31)
CREAT SERPL-MCNC: 0.76 MG/DL (ref 0.7–1.3)
EOSINOPHIL COUNT (ABSOLUTE): 0.1 THOU/UL (ref 0–0.4)
EOSINOPHIL NFR BLD AUTO: 1 % (ref 0–6)
ERYTHROCYTE [DISTWIDTH] IN BLOOD BY AUTOMATED COUNT: 12.6 % (ref 11–14.5)
GFR SERPL CREATININE-BSD FRML MDRD: >60 ML/MIN/1.73M2
GLUCOSE BLD-MCNC: 94 MG/DL (ref 70–125)
HCT VFR BLD AUTO: 41.5 % (ref 40–54)
HGB BLD-MCNC: 14.1 G/DL (ref 14–18)
IGA SERPL-MCNC: 2259 MG/DL
LDH SERPL L TO P-CCNC: 181 U/L (ref 125–220)
LYMPHOCYTES # BLD AUTO: 9.7 THOU/UL (ref 0.8–4.4)
LYMPHOCYTES NFR BLD AUTO: 78 % (ref 20–40)
MCH RBC QN AUTO: 32.3 PG (ref 27–34)
MCHC RBC AUTO-ENTMCNC: 34 G/DL (ref 32–36)
MCV RBC AUTO: 95 FL (ref 80–100)
MONOCYTES # BLD AUTO: 0.2 THOU/UL (ref 0–0.9)
MONOCYTES NFR BLD AUTO: 2 % (ref 2–10)
PLAT MORPH BLD: ABNORMAL
PLATELET # BLD AUTO: 124 THOU/UL (ref 140–440)
PMV BLD AUTO: 9.2 FL (ref 8.5–12.5)
POTASSIUM BLD-SCNC: 4 MMOL/L (ref 3.5–5)
PROT SERPL-MCNC: 7 G/DL (ref 6–8)
RBC # BLD AUTO: 4.36 MILL/UL (ref 4.4–6.2)
REACTIVE LYMPHS: ABNORMAL
SODIUM SERPL-SCNC: 134 MMOL/L (ref 136–145)
TOTAL NEUTROPHILS-ABS(DIFF): 2.4 THOU/UL (ref 2–7.7)
TOTAL NEUTROPHILS-REL(DIFF): 19 % (ref 50–70)
WBC: 12.4 THOU/UL (ref 4–11)

## 2019-11-08 LAB
ALBUMIN PERCENT: 55.6 % (ref 51–67)
ALBUMIN SERPL ELPH-MCNC: 3.8 G/DL (ref 3.2–4.7)
ALPHA 1 PERCENT: 2.5 % (ref 2–4)
ALPHA 2 PERCENT: 8.7 % (ref 5–13)
ALPHA1 GLOB SERPL ELPH-MCNC: 0.2 G/DL (ref 0.1–0.3)
ALPHA2 GLOB SERPL ELPH-MCNC: 0.6 G/DL (ref 0.4–0.9)
B-GLOBULIN SERPL ELPH-MCNC: 0.6 G/DL (ref 0.7–1.2)
BETA PERCENT: 8.6 % (ref 10–17)
GAMMA GLOB SERPL ELPH-MCNC: 1.7 G/DL (ref 0.6–1.4)
GAMMA GLOBULIN PERCENT: 24.6 % (ref 9–20)
PATH ICD:: ABNORMAL
PROT PATTERN SERPL ELPH-IMP: ABNORMAL
PROT SERPL-MCNC: 6.9 G/DL (ref 6–8)
REVIEWING PATHOLOGIST: ABNORMAL

## 2020-01-02 ENCOUNTER — RECORDS - HEALTHEAST (OUTPATIENT)
Dept: ADMINISTRATIVE | Facility: OTHER | Age: 70
End: 2020-01-02

## 2020-02-05 ENCOUNTER — RECORDS - HEALTHEAST (OUTPATIENT)
Dept: ADMINISTRATIVE | Facility: OTHER | Age: 70
End: 2020-02-05

## 2020-03-11 ENCOUNTER — HEALTH MAINTENANCE LETTER (OUTPATIENT)
Age: 70
End: 2020-03-11

## 2020-03-20 ENCOUNTER — COMMUNICATION - HEALTHEAST (OUTPATIENT)
Dept: ADMINISTRATIVE | Facility: HOSPITAL | Age: 70
End: 2020-03-20

## 2020-05-11 ENCOUNTER — COMMUNICATION - HEALTHEAST (OUTPATIENT)
Dept: ADMINISTRATIVE | Facility: HOSPITAL | Age: 70
End: 2020-05-11

## 2020-07-21 ENCOUNTER — OFFICE VISIT - HEALTHEAST (OUTPATIENT)
Dept: INTERNAL MEDICINE | Facility: CLINIC | Age: 70
End: 2020-07-21

## 2020-07-21 DIAGNOSIS — I25.10 ASCVD (ARTERIOSCLEROTIC CARDIOVASCULAR DISEASE): ICD-10-CM

## 2020-07-21 DIAGNOSIS — Z00.00 ROUTINE GENERAL MEDICAL EXAMINATION AT A HEALTH CARE FACILITY: ICD-10-CM

## 2020-07-21 DIAGNOSIS — C91.10 CLL (CHRONIC LYMPHOCYTIC LEUKEMIA) (H): ICD-10-CM

## 2020-07-21 DIAGNOSIS — R33.8 BENIGN PROSTATIC HYPERPLASIA WITH URINARY RETENTION: ICD-10-CM

## 2020-07-21 DIAGNOSIS — N40.1 BENIGN PROSTATIC HYPERPLASIA WITH URINARY RETENTION: ICD-10-CM

## 2020-07-21 DIAGNOSIS — Z13.220 SCREENING FOR HYPERLIPIDEMIA: ICD-10-CM

## 2020-07-21 DIAGNOSIS — I71.40 ABDOMINAL AORTIC ANEURYSM (AAA) WITHOUT RUPTURE (H): ICD-10-CM

## 2020-07-21 DIAGNOSIS — Z23 IMMUNIZATION DUE: ICD-10-CM

## 2020-07-21 LAB
ANION GAP SERPL CALCULATED.3IONS-SCNC: 6 MMOL/L (ref 5–18)
BUN SERPL-MCNC: 12 MG/DL (ref 8–22)
CALCIUM SERPL-MCNC: 8.7 MG/DL (ref 8.5–10.5)
CHLORIDE BLD-SCNC: 98 MMOL/L (ref 98–107)
CHOLEST SERPL-MCNC: 192 MG/DL
CO2 SERPL-SCNC: 28 MMOL/L (ref 22–31)
CREAT SERPL-MCNC: 0.79 MG/DL (ref 0.7–1.3)
FASTING STATUS PATIENT QL REPORTED: YES
GFR SERPL CREATININE-BSD FRML MDRD: >60 ML/MIN/1.73M2
GLUCOSE BLD-MCNC: 76 MG/DL (ref 70–125)
HDLC SERPL-MCNC: 60 MG/DL
LDLC SERPL CALC-MCNC: 119 MG/DL
POTASSIUM BLD-SCNC: 3.8 MMOL/L (ref 3.5–5)
SODIUM SERPL-SCNC: 132 MMOL/L (ref 136–145)
TRIGL SERPL-MCNC: 65 MG/DL

## 2020-07-21 ASSESSMENT — MIFFLIN-ST. JEOR: SCORE: 1589.07

## 2020-08-27 ENCOUNTER — AMBULATORY - HEALTHEAST (OUTPATIENT)
Dept: INFUSION THERAPY | Facility: CLINIC | Age: 70
End: 2020-08-27

## 2020-08-27 ENCOUNTER — OFFICE VISIT - HEALTHEAST (OUTPATIENT)
Dept: ONCOLOGY | Facility: CLINIC | Age: 70
End: 2020-08-27

## 2020-08-27 DIAGNOSIS — C91.10 CLL (CHRONIC LYMPHOCYTIC LEUKEMIA) (H): ICD-10-CM

## 2020-08-27 DIAGNOSIS — D47.2 MGUS (MONOCLONAL GAMMOPATHY OF UNKNOWN SIGNIFICANCE): ICD-10-CM

## 2020-08-27 LAB
ALBUMIN SERPL-MCNC: 3.2 G/DL (ref 3.5–5)
ALP SERPL-CCNC: 57 U/L (ref 45–120)
ALT SERPL W P-5'-P-CCNC: 18 U/L (ref 0–45)
ANION GAP SERPL CALCULATED.3IONS-SCNC: 4 MMOL/L (ref 5–18)
AST SERPL W P-5'-P-CCNC: 22 U/L (ref 0–40)
BASOPHILS # BLD AUTO: 0.1 THOU/UL (ref 0–0.2)
BASOPHILS NFR BLD AUTO: 1 % (ref 0–2)
BILIRUB SERPL-MCNC: 0.8 MG/DL (ref 0–1)
BUN SERPL-MCNC: 13 MG/DL (ref 8–22)
CALCIUM SERPL-MCNC: 8.6 MG/DL (ref 8.5–10.5)
CHLORIDE BLD-SCNC: 97 MMOL/L (ref 98–107)
CO2 SERPL-SCNC: 31 MMOL/L (ref 22–31)
CREAT SERPL-MCNC: 0.83 MG/DL (ref 0.7–1.3)
EOSINOPHIL COUNT (ABSOLUTE): 0.1 THOU/UL (ref 0–0.4)
EOSINOPHIL NFR BLD AUTO: 1 % (ref 0–6)
ERYTHROCYTE [DISTWIDTH] IN BLOOD BY AUTOMATED COUNT: 12.7 % (ref 11–14.5)
GFR SERPL CREATININE-BSD FRML MDRD: >60 ML/MIN/1.73M2
GLUCOSE BLD-MCNC: 69 MG/DL (ref 70–125)
HCT VFR BLD AUTO: 41.4 % (ref 40–54)
HGB BLD-MCNC: 14.2 G/DL (ref 14–18)
IGA SERPL-MCNC: 2402 MG/DL
IMMATURE GRANULOCYTE % - MAN (DIFF): 0 %
IMMATURE GRANULOCYTE ABSOLUTE - MAN (DIFF): 0 THOU/UL
LYMPHOCYTES # BLD AUTO: 4 THOU/UL (ref 0.8–4.4)
LYMPHOCYTES NFR BLD AUTO: 55 % (ref 20–40)
MCH RBC QN AUTO: 32.1 PG (ref 27–34)
MCHC RBC AUTO-ENTMCNC: 34.3 G/DL (ref 32–36)
MCV RBC AUTO: 94 FL (ref 80–100)
MONOCYTES # BLD AUTO: 0.4 THOU/UL (ref 0–0.9)
MONOCYTES NFR BLD AUTO: 5 % (ref 2–10)
PLAT MORPH BLD: NORMAL
PLATELET # BLD AUTO: 153 THOU/UL (ref 140–440)
PMV BLD AUTO: 8.7 FL (ref 8.5–12.5)
POTASSIUM BLD-SCNC: 4.4 MMOL/L (ref 3.5–5)
PROT SERPL-MCNC: 7.2 G/DL (ref 6–8)
RBC # BLD AUTO: 4.43 MILL/UL (ref 4.4–6.2)
SODIUM SERPL-SCNC: 132 MMOL/L (ref 136–145)
TOTAL NEUTROPHILS-ABS(DIFF): 2.7 THOU/UL (ref 2–7.7)
TOTAL NEUTROPHILS-REL(DIFF): 38 % (ref 50–70)
WBC: 7.2 THOU/UL (ref 4–11)

## 2020-08-28 LAB
ALBUMIN PERCENT: 56.2 % (ref 51–67)
ALBUMIN SERPL ELPH-MCNC: 3.9 G/DL (ref 3.2–4.7)
ALPHA 1 PERCENT: 2.5 % (ref 2–4)
ALPHA 2 PERCENT: 8.4 % (ref 5–13)
ALPHA1 GLOB SERPL ELPH-MCNC: 0.2 G/DL (ref 0.1–0.3)
ALPHA2 GLOB SERPL ELPH-MCNC: 0.6 G/DL (ref 0.4–0.9)
B-GLOBULIN SERPL ELPH-MCNC: 0.6 G/DL (ref 0.7–1.2)
BETA PERCENT: 8.2 % (ref 10–17)
GAMMA GLOB SERPL ELPH-MCNC: 1.7 G/DL (ref 0.6–1.4)
GAMMA GLOBULIN PERCENT: 24.7 % (ref 9–20)
M PROTEIN SERPL ELPH-MCNC: 1.3 G/DL
PATH ICD:: ABNORMAL
PROT PATTERN SERPL ELPH-IMP: ABNORMAL
PROT SERPL-MCNC: 6.9 G/DL (ref 6–8)
REVIEWING PATHOLOGIST: ABNORMAL

## 2020-09-03 ENCOUNTER — COMMUNICATION - HEALTHEAST (OUTPATIENT)
Dept: ONCOLOGY | Facility: CLINIC | Age: 70
End: 2020-09-03

## 2020-10-11 ENCOUNTER — AMBULATORY - HEALTHEAST (OUTPATIENT)
Dept: NURSING | Facility: CLINIC | Age: 70
End: 2020-10-11

## 2021-01-03 ENCOUNTER — HEALTH MAINTENANCE LETTER (OUTPATIENT)
Age: 71
End: 2021-01-03

## 2021-01-15 ENCOUNTER — COMMUNICATION - HEALTHEAST (OUTPATIENT)
Dept: ONCOLOGY | Facility: CLINIC | Age: 71
End: 2021-01-15

## 2021-01-15 ENCOUNTER — COMMUNICATION - HEALTHEAST (OUTPATIENT)
Dept: INTERNAL MEDICINE | Facility: CLINIC | Age: 71
End: 2021-01-15

## 2021-01-19 ENCOUNTER — COMMUNICATION - HEALTHEAST (OUTPATIENT)
Dept: ADMINISTRATIVE | Facility: HOSPITAL | Age: 71
End: 2021-01-19

## 2021-02-01 ENCOUNTER — COMMUNICATION - HEALTHEAST (OUTPATIENT)
Dept: INTERNAL MEDICINE | Facility: CLINIC | Age: 71
End: 2021-02-01

## 2021-02-04 ENCOUNTER — AMBULATORY - HEALTHEAST (OUTPATIENT)
Dept: NURSING | Facility: CLINIC | Age: 71
End: 2021-02-04

## 2021-02-25 ENCOUNTER — AMBULATORY - HEALTHEAST (OUTPATIENT)
Dept: NURSING | Facility: CLINIC | Age: 71
End: 2021-02-25

## 2021-03-04 ENCOUNTER — AMBULATORY - HEALTHEAST (OUTPATIENT)
Dept: INFUSION THERAPY | Facility: CLINIC | Age: 71
End: 2021-03-04

## 2021-03-04 ENCOUNTER — OFFICE VISIT - HEALTHEAST (OUTPATIENT)
Dept: ONCOLOGY | Facility: CLINIC | Age: 71
End: 2021-03-04

## 2021-03-04 DIAGNOSIS — D47.2 MGUS (MONOCLONAL GAMMOPATHY OF UNKNOWN SIGNIFICANCE): ICD-10-CM

## 2021-03-04 DIAGNOSIS — C91.10 CLL (CHRONIC LYMPHOCYTIC LEUKEMIA) (H): ICD-10-CM

## 2021-03-04 LAB
ALBUMIN SERPL-MCNC: 3.5 G/DL (ref 3.5–5)
ALP SERPL-CCNC: 56 U/L (ref 45–120)
ALT SERPL W P-5'-P-CCNC: 14 U/L (ref 0–45)
ANION GAP SERPL CALCULATED.3IONS-SCNC: 7 MMOL/L (ref 5–18)
AST SERPL W P-5'-P-CCNC: 19 U/L (ref 0–40)
BASOPHILS # BLD AUTO: 0 THOU/UL (ref 0–0.2)
BASOPHILS NFR BLD AUTO: 0 % (ref 0–2)
BILIRUB SERPL-MCNC: 0.9 MG/DL (ref 0–1)
BUN SERPL-MCNC: 13 MG/DL (ref 8–22)
CALCIUM SERPL-MCNC: 8.4 MG/DL (ref 8.5–10.5)
CHLORIDE BLD-SCNC: 95 MMOL/L (ref 98–107)
CO2 SERPL-SCNC: 28 MMOL/L (ref 22–31)
CREAT SERPL-MCNC: 0.74 MG/DL (ref 0.7–1.3)
EOSINOPHIL COUNT (ABSOLUTE): 0.1 THOU/UL (ref 0–0.4)
EOSINOPHIL NFR BLD AUTO: 1 % (ref 0–6)
ERYTHROCYTE [DISTWIDTH] IN BLOOD BY AUTOMATED COUNT: 13.1 % (ref 11–14.5)
GFR SERPL CREATININE-BSD FRML MDRD: >60 ML/MIN/1.73M2
GLUCOSE BLD-MCNC: 83 MG/DL (ref 70–125)
HCT VFR BLD AUTO: 39.7 % (ref 40–54)
HGB BLD-MCNC: 14 G/DL (ref 14–18)
IGA SERPL-MCNC: 16 MG/DL (ref 65–400)
IGA SERPL-MCNC: 2444 MG/DL
IGM SERPL-MCNC: 27 MG/DL (ref 60–280)
IMMATURE GRANULOCYTE % - MAN (DIFF): 0 %
IMMATURE GRANULOCYTE ABSOLUTE - MAN (DIFF): 0 THOU/UL
LYMPHOCYTES # BLD AUTO: 4.9 THOU/UL (ref 0.8–4.4)
LYMPHOCYTES NFR BLD AUTO: 66 % (ref 20–40)
MCH RBC QN AUTO: 32.6 PG (ref 27–34)
MCHC RBC AUTO-ENTMCNC: 35.3 G/DL (ref 32–36)
MCV RBC AUTO: 93 FL (ref 80–100)
MONOCYTES # BLD AUTO: 0.3 THOU/UL (ref 0–0.9)
MONOCYTES NFR BLD AUTO: 4 % (ref 2–10)
PLAT MORPH BLD: NORMAL
PLATELET # BLD AUTO: 163 THOU/UL (ref 140–440)
PMV BLD AUTO: 8.9 FL (ref 8.5–12.5)
POTASSIUM BLD-SCNC: 3.8 MMOL/L (ref 3.5–5)
PROT SERPL-MCNC: 7.3 G/DL (ref 6–8)
RBC # BLD AUTO: 4.29 MILL/UL (ref 4.4–6.2)
REACTIVE LYMPHS: ABNORMAL
SODIUM SERPL-SCNC: 130 MMOL/L (ref 136–145)
TOTAL NEUTROPHILS-ABS(DIFF): 2.1 THOU/UL (ref 2–7.7)
TOTAL NEUTROPHILS-REL(DIFF): 29 % (ref 50–70)
WBC: 7.4 THOU/UL (ref 4–11)

## 2021-03-05 LAB
ALBUMIN PERCENT: 54.5 % (ref 51–67)
ALBUMIN SERPL ELPH-MCNC: 3.9 G/DL (ref 3.2–4.7)
ALPHA 1 PERCENT: 2.4 % (ref 2–4)
ALPHA 2 PERCENT: 8.3 % (ref 5–13)
ALPHA1 GLOB SERPL ELPH-MCNC: 0.2 G/DL (ref 0.1–0.3)
ALPHA2 GLOB SERPL ELPH-MCNC: 0.6 G/DL (ref 0.4–0.9)
B-GLOBULIN SERPL ELPH-MCNC: 0.6 G/DL (ref 0.7–1.2)
BETA PERCENT: 8.3 % (ref 10–17)
GAMMA GLOB SERPL ELPH-MCNC: 1.9 G/DL (ref 0.6–1.4)
GAMMA GLOBULIN PERCENT: 26.5 % (ref 9–20)
M PROTEIN SERPL ELPH-MCNC: 1.4 G/DL
PATH ICD:: ABNORMAL
PATH ICD:: NORMAL
PROT PATTERN SERPL ELPH-IMP: ABNORMAL
PROT PATTERN SERPL IFE-IMP: NORMAL
PROT SERPL-MCNC: 7.1 G/DL (ref 6–8)
REVIEWING PATHOLOGIST: ABNORMAL
REVIEWING PATHOLOGIST: NORMAL

## 2021-03-08 LAB
KAPPA LC FREE SER-MCNC: 0.74 MG/DL (ref 0.33–1.94)
KAPPA LC FREE/LAMBDA FREE SER NEPH: 0.03 {RATIO} (ref 0.26–1.65)
LAMBDA LC FREE SERPL-MCNC: 24.32 MG/DL (ref 0.57–2.63)

## 2021-04-25 ENCOUNTER — HEALTH MAINTENANCE LETTER (OUTPATIENT)
Age: 71
End: 2021-04-25

## 2021-05-26 NOTE — TELEPHONE ENCOUNTER
Patient Returning Call  Reason for call:   Patient is returning call  Information relayed to patient:   Found nothing to relay, patient states there was an urgent message from Dr Hopper that there was some complications to his Imaging this morning  Patient has additional questions:  Yes  If YES, what are your questions/concerns:  Why the call  Okay to leave a detailed message?: Yes

## 2021-05-27 NOTE — TELEPHONE ENCOUNTER
Referral to hematology/oncology received from Dr. Rayshawn Sosa for dx of lymphocytosis, splenomegaly, abnormal CBC w/ diff.    I called Hilario, introduced myself and relayed the purpose of my call.  He has been scheduled for a consult with Dr. Pedersen on Tuesday, April 16th 2019 at 11:00, RN appt at 10:45.  He was instructed to arrive by 10:45 with completed intake and updated medication list.    Hilario is currently seeing Dr. Sosa for further w/u of a positive Cologuard test.  He is scheduled for a colonoscopy on April 15th.  He also had a consult with Dr. Fields at [facility] for urinary retention and prostate eval.  A catheter was placed at that time and he is scheduled for a f/u on 4/17.  I verified Hialrio has not met with an oncologist or hematologist in the past.  I emailed the new patient intake, medication list, appt letter, MD bio card and an SMOOTH to Hilario's email address per his request ([email or web address]).  I shared what he can expect with his appt.  I provided my direct number and invited calls.  Hilario will mail the SMOOTH to [facility].

## 2021-05-27 NOTE — TELEPHONE ENCOUNTER
Reason for Disposition    [1] Catheter is broken AND [2] is not usable    Protocols used: URINARY CATHETER SYMPTOMS AND [id number]

## 2021-05-27 NOTE — TELEPHONE ENCOUNTER
Wife calling about     Went to [facility] and got cath and bag.    Now bag is full of bloody urine.    Fluid in cath is bloody red and up to his penis full.  SHe does not hink it is draining properly.    Going to [facility] now for cath care and blood in urine.    Caller agrees with care advice given. Agreed to call back if patient has additional symptoms or questions.    Clara Proctor, RN, [facility] Nurse Triage/Med Refills RN

## 2021-05-27 NOTE — TELEPHONE ENCOUNTER
Test Results  Who is calling?:   [facility]  Who ordered the test:   Ruchi  Type of test: Other: Cologard  Date of test:   n/a  Where was the test performed:   [facility]  What are your questions/concerns?:   Abnormal results sent through portal for the patient  Please advise.  Okay to leave a detailed message?:  Yes

## 2021-05-27 NOTE — PROGRESS NOTES
[facility] Hematology and Oncology Consult Note    Patient: Hilario Meeks  MRN: [id number]  Date of Service: 04/16/2019      Reason for Visit    Chief Complaint   Patient presents with     Benign Hematology       Assessment/Plan    ECOG Performance   ECOG Performance Status: 1(colonoscopy today)  Distress Assessment  Distress Assessment Score: 1(anxious to know what's going on)    #.  Mild lymphocytosis, mild thrombocytopenia and splenomegaly.   He does not have any known chronic liver disease although he recently drink wine on a daily basis in the last couple years.  No other evidence of end-stage liver disease.  We discussed about further evaluation on hematologic disorder such as lymphoproliferative disease, multiple myeloma.  I will start with initial blood work to direct the next step in care.  I explained to him that I will need additional imaging and potentially bone marrow biopsy if blood work suggests primary bone marrow disease.   I will call with the results next week for next step in care.    Problem List    1. Splenomegaly  HM1(CBC and Differential)    Comprehensive Metabolic Panel    Immunoglobulin Free Light Chains, Serum    Immunoglobulins IgG, IgA, IgM    Electrophoresis, Protein, Serum    Immunofixation Electrophoresis, Serum    Beta-2-Microglobulin (Beta-2-M)    Immunofixation Electrophoresis, Urine    Morphology,Smear Review (MORP)    Flow cytometry    Folate, Serum    Vitamin B12    HM1 (CBC with Diff)    Manual Differential    Peripheral Blood Smear, Path Review   2. Lymphocytosis  HM1(CBC and Differential)    Comprehensive Metabolic Panel    Immunoglobulin Free Light Chains, Serum    Immunoglobulins IgG, IgA, IgM    Electrophoresis, Protein, Serum    Immunofixation Electrophoresis, Serum    Beta-2-Microglobulin (Beta-2-M)    Immunofixation Electrophoresis, Urine    Morphology,Smear Review (MORP)    Flow cytometry    Folate, Serum    Vitamin B12    HM1 (CBC with Diff)    Manual Differential     Peripheral Blood Smear, Path Review     ______________________________________________________________________________    History    Mr. 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  He had abdomen pelvis CT scan for evaluation of palpable mass in the bladder area with urinary retention symptoms.  He was found splenomegaly with mild thrombocytopenia.  He reported his weight has been fluctuating between 175-180 pounds and stable.  He is in fact try to lose weight.  Appetite is good.  Energy level is good.  No drenching night sweats.  Currently dealing with urinary tension and management under urology.    He is .  They have 2 children.  He is a retired professor in psychology.    Never smoker.    Denies family history of hematologic malignancy, leukemia or lymphoma.    Underwent colonoscopy today for Cologuard positive test and found 3 polyps.  10 mm pedunculated sigmoid polyp, 9 mm pedunculated rectal polyp, 16 x 20 mm sessile rectal polyp.  Pathology pending.      Past History    History reviewed. No pertinent past medical history. Family History   Problem Relation Age of Onset     No Medical Problems Mother      Aortic aneurysm Father         thoracic     Obesity Brother      Hypertension Brother      No Medical Problems Son         Makes [employer]     No Medical Problems Daughter         Genetic professor      Past Surgical History:   Procedure Laterality Date     NO PAST SURGERIES      Social History     Socioeconomic History     Marital status:      Spouse name: Not on file     Number of children: Not on file     Years of education: Not on file     Highest education level: Not on file   Occupational History     Not on file   Social Needs     Financial resource strain: Not on file     Food insecurity:     Worry: Not on file     Inability: Not on file     Transportation needs:     Medical: Not on file      "Non-medical: Not on file   Tobacco Use     Smoking status: Never Smoker     Smokeless tobacco: Never Used   Substance and Sexual Activity     Alcohol use: Yes     Alcohol/week: 6.0 oz     Types: 10 Standard drinks or equivalent per week     Comment: \"14 lunch & dinner\"     Drug use: No     Sexual activity: Not Currently     Partners: Female   Lifestyle     Physical activity:     Days per week: Not on file     Minutes per session: Not on file     Stress: Not on file   Relationships     Social connections:     Talks on phone: Not on file     Gets together: Not on file     Attends Mosque service: Not on file     Active member of club or organization: Not on file     Attends meetings of clubs or organizations: Not on file     Relationship status: Not on file     Intimate partner violence:     Fear of current or ex partner: Not on file     Emotionally abused: Not on file     Physically abused: Not on file     Forced sexual activity: Not on file   Other Topics Concern     Not on file   Social History Narrative    Lives with his wife, Luz.  Retired professor of pragmatism, [employer].  PhD [employer].  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          Allergies    No Known Allergies    Review of Systems    General  General (WDL): All general elements are within defined limits  ENT  ENT (WDL): All ENT elements are within defined limits  Respiratory  Respiratory (WDL): All respiratory elements are within defined limits  Cardiovascular  Cardiovascular (WDL): All cardiovascular elements are within defined limits  Endocrine  Endocrine (WDL): All endocrine elements are within defined limits  Gastrointestinal  Gastrointestinal (WDL): All gastrointestinal elements are within defined limits  Musculoskeletal  Musculoskeletal (WDL): All musculoskeletal elements are within defined limits  Neurological  Neurological (WDL): All neurological elements are within defined " "limits  Psychological/Emotional  Psychological/Emotional (WDL): All psychological/emotional elements are within defined limits  Hematological/Lymphatic  Hematological/Lymphatic (WDL): All hematological/lymphatic elements are within defined limits  Dermatological  Dermatologic (WDL): All dermatological elements are within defined limits  Genitourinary/Reproductive  Genitourinary/Reproductive (WDL): Exceptions to WDL(blockage of bladder drained over 1 liter on 4/3/19-catheter )  Reproductive (Females only)     Pain  Currently in Pain: No/denies    Physical Exam    Recent Vitals 4/16/2019   Height 6' 2.4\"   Weight 170 lbs 13 oz   BSA (m2) 2.02 m2   BP 92/58   Pulse 53   Temp 97.6   Temp src 1   SpO2 97     General: alert, awake, not in acute distress  HEENT: Head: Normal, normocephalic, atraumatic.  Eye: Normal external eye, conjunctiva, lids cornea, YARITZA.  Ears:  Non-tender.  Nose: Normal external nose, mucus membranes and septum.  Pharynx: Dental Hygiene adequate. Normal buccal mucosa. Normal pharynx.  Neck / Thyroid: Supple, no masses, nodes, nodules or enlargement.  Lymphatics: No abnormally enlarged lymph nodes.  Chest: Normal chest wall and respirations. Clear to auscultation.  Heart: S1 S2 RRR, no murmur.   Abdomen: abdomen is soft without significant tenderness, masses, organomegaly or guarding.  He has a urinary catheter placed.  Extremities: normal strength, tone, and muscle mass  Skin: normal. no rash or abnormalities  CNS: non focal.      Lab Results    Recent Results (from the past 168 hour(s))   Comprehensive Metabolic Panel   Result Value Ref Range    Sodium 132 (L) 136 - 145 mmol/L    Potassium 3.8 3.5 - 5.0 mmol/L    Chloride 99 98 - 107 mmol/L    CO2 28 22 - 31 mmol/L    Anion Gap, Calculation 5 5 - 18 mmol/L    Glucose 73 70 - 125 mg/dL    BUN 8 8 - 22 mg/dL    Creatinine 0.84 0.70 - 1.30 mg/dL    GFR MDRD Af Amer >60 >60 mL/min/1.73m2    GFR MDRD Non Af Amer >60 >60 mL/min/1.73m2    Bilirubin, Total " 0.8 0.0 - 1.0 mg/dL    Calcium 8.6 8.5 - 10.5 mg/dL    Protein, Total 7.2 6.0 - 8.0 g/dL    Albumin 3.7 3.5 - 5.0 g/dL    Alkaline Phosphatase 56 45 - 120 U/L    AST 21 0 - 40 U/L    ALT 14 0 - 45 U/L   Immunoglobulin Free Light Chains, Serum   Result Value Ref Range    Kappa Free Lt Chain 0.78 0.33 - 1.94 mg/dL    Lambda Free Lt Chain 17.50 (H) 0.57 - 2.63 mg/dL    Kappa Lambda Ratio 0.04 (L) 0.26 - 1.65   Immunoglobulins IgG, IgA, IgM   Result Value Ref Range    Immunoglobulin G 2,298 (H) 700-1,700 mg/dL    Immunoglobulin M 41 (L) 60 - 280 mg/dL    Immunoglobulin A 32 (L) 65 - 400 mg/dL   Immunofixation Electrophoresis, Serum   Result Value Ref Range    Immunofixation Electrophoresis, Serum Monoclonal IgG-lambda immunoglobulin present.     Path ICD: D72.820     Interpreted By: Oc Muñoz MD    [id number] (Beta-2-M)   Result Value Ref Range    Beta-2-Microglobulin 2.5 (H) <2.3 mg/L   Morphology,Smear Review (MORP)   Result Value Ref Range    Pathology, Smear Review See Separate Pathology Report (!) (none)    WBC 9.0 4.0 - 11.0 thou/uL    RBC 4.54 4.40 - 6.20 mill/uL    Hemoglobin 14.7 14.0 - 18.0 g/dL    Hematocrit 42.5 40.0 - 54.0 %    MCV 94 80 - 100 fL    MCH 32.4 27.0 - 34.0 pg    MCHC 34.6 32.0 - 36.0 g/dL    RDW 13.2 11.0 - 14.5 %    Platelets 126 (L) 140 - 440 thou/uL    MPV 9.2 8.5 - 12.5 fL   Folate, Serum   Result Value Ref Range    Folate 13.8 >=3.5 ng/mL   Vitamin B12   Result Value Ref Range    Vitamin B-12 1,009 (H) 213 - 816 pg/mL   HM1 (CBC with Diff)   Result Value Ref Range    WBC 9.0 4.0 - 11.0 thou/uL    RBC 4.54 4.40 - 6.20 mill/uL    Hemoglobin 14.7 14.0 - 18.0 g/dL    Hematocrit 42.5 40.0 - 54.0 %    MCV 94 80 - 100 fL    MCH 32.4 27.0 - 34.0 pg    MCHC 34.6 32.0 - 36.0 g/dL    RDW 13.2 11.0 - 14.5 %    Platelets 126 (L) 140 - 440 thou/uL    MPV 9.2 8.5 - 12.5 fL   Manual Differential   Result Value Ref Range    Total Neutrophils % 32 (L) 50 - 70 %    Lymphocytes % 64 (H) 20  - 40 %    Monocytes % 2 2 - 10 %    Eosinophils %  0 0 - 6 %    Basophils % 2 0 - 2 %    Total Neutrophils Absolute 2.9 2.0 - 7.7 thou/ul    Lymphocytes Absolute 5.8 (H) 0.8 - 4.4 thou/uL    Monocytes Absolute 0.2 0.0 - 0.9 thou/uL    Eosinophils Absolute 0.0 0.0 - 0.4 thou/uL    Basophils Absolute 0.2 0.0 - 0.2 thou/uL    Platelet Estimate Decreased (!) Normal   Peripheral Blood Smear, Path Review   Result Value Ref Range    Case Report       Peripheral Blood Morphology                       Case: [id number]                                   Authorizing Provider:  Alpa Pedersen MD      Collected:           04/16/2019 1126              Ordering Location:     [facility] and Received:            04/16/2019 1238                                     Hematology                                                                   Pathologist:           Oc Muñoz MD                                                        Specimen:    Peripheral Blood                                                                           Final Diagnosis       PERIPHERAL BLOOD:     -   ATYPICAL LYMPHOCYTOSIS (PENDING FLOW CYTOMETRY)     -   MILD THROMBOCYTOPENIA    Comment       The clinical history has been reviewed.  Flow cytometry has been ordered in [facility], pending review.     Thrombocytopenia is a nonspecific finding and may be due to multiple etiologies that cause decreased platelet production, increased platelet destruction by immune and by non-immune processes, and abnormal platelet pooling. Please correlate with clinical findings.    Clinical Information R16.1, D72.820       Peripheral Smear       Red blood cells are normal in number and overall normochromic and normocytic. Anisopoikilocytosis, polychromasia, and rouleaux formation are not prominent.    The white blood cell count and differential appear as reported on the CBC. Leukocytes are high normal in number and demonstrate an atypical lymphocytosis.  Lymphocytes are generally small to medium size mature forms. No blasts or dysplastic changes are identified.    Platelets are decreased in number but overall normal in appearance.    Charges CPT: 13283  ICD-10: D72.820     Result Flag Abnormal (!) Normal       Imaging Results    Ct Abdomen Pelvis Without Oral With Iv Contrast    Result Date: 3/22/2019  EXAM: CT ABDOMEN PELVIS WO ORAL W IV CONTRAST LOCATION: [facility] DATE/TIME: 3/22/2019 7:24 AM INDICATION: Left lower quadrant abdominal pain. COMPARISON: None. TECHNIQUE: Helical enhanced thin-section CT scan of the abdomen and pelvis was performed following injection of IV contrast. Multiplanar reformats were obtained. Dose reduction techniques were used. CONTRAST: Iohexol (Omni) 100 mL. FINDINGS: LUNG BASES: Negative. ABDOMEN: Hepatic steatosis. 0.9 x 1.1 cm hypodensity inferior aspect of the right lobe of liver as well as scattered subcentimeter hypodensities in the right and left hepatic lobes would be consistent with small cysts. The spleen is enlarged measuring 14  x 9.8 x 15 cm. There are perisplenic varices. Stomach and duodenal sweep normal. Nothing for bowel obstruction. Adrenal glands are normal. Right kidney 0.9 x 1.2 cm hypodensity lower pole consistent with a cyst. Medial hypodensity 1.9 x 2.0 cm also consistent with a cyst. No renal calculi or hydronephrosis. Left kidney negative. ASCVD aorta. Infrarenal aorta measures 2.8 x 3.0 cm. Right common iliac artery 1.0 x 1.7 cm, left common iliac artery 1.5 x 1.5 cm. No free fluid in the abdomen. PELVIS: Urine-distended bladder which extends to the umbilicus. The bladder measures 18 x 10 x 9 cm. Prostate gland enlargement. Evidence for diverticulitis. No significant colonic diverticula. MUSCULOSKELETAL: Narrowing at L3-L4 and L4-L5. No compression fractures.     CONCLUSION: 1.  Markedly urine-distended bladder which measures 18 x 10 x 9 cm as above. 2.  Splenomegaly with perisplenic varices may be  reflective of portal hypertension. 3.  Hepatic steatosis with small hypodensities in the right and left hepatic lobes difficult to fully characterize but likely are cysts 4. ASCVD aorta with the aorta measuring 2.8 x 3.0 cm. AAA Size: 3.0 cm to 3.4 cm, Recommended Follow Up Ultrasound Aorta: Every 3 years REFERENCE: [facility] practice guidelines for the care of patients with an abdominal aortic aneurysm: Executive summary. Salomón Pisano MD, PhD et al. Journal of Vascular Surgery Oct 2009.      TT: 80 minutes and more than 50% was spent on coordination and counseling of care.    Signed by: Alpa Pedersen MD

## 2021-05-27 NOTE — PROGRESS NOTES
Patient here today for initial consultation with Dr. Pedersen for lymphocytosis, splenomegalyJ. Juan, LAYA

## 2021-05-28 NOTE — TELEPHONE ENCOUNTER
Patient Returning Call  Reason for call:  Patient's wife is returning a call.  Information relayed to patient:  None, no message found.  Patient has additional questions:  Yes  If YES, what are your questions/concerns:  Patient has a list of question he would like to go over with Dr. Hopper. Please reach out to patient and advise.  Okay to leave a detailed message?: Yes [phone]

## 2021-05-28 NOTE — PROGRESS NOTES
Patient here today for follow-up visit with Dr. Pedersen for thrombocytopenia, splenomegaly/FARTUN Martinez RN

## 2021-05-28 NOTE — PATIENT INSTRUCTIONS - HE
Place patient instructions, either created by your organization or obtained from a 3rd party, here.

## 2021-05-28 NOTE — PROGRESS NOTES
Bone Marrow Biopsy and Aspiration Procedure Note     Informed consent was obtained and potential risks including bleeding, infection and pain were reviewed with the patient.     Posterior iliac crest(s) prepped with Betadine.     Lidocaine 1% local anesthesia infiltrated into the subcutaneous tissue.    Left PIC bone marrow biopsy and bone marrow aspirate was obtained.     The procedure was tolerated well and there were no complications.    Specimens sent for: routine.    Physician: Oc Muñoz

## 2021-05-28 NOTE — TELEPHONE ENCOUNTER
Patient Returning Call  Reason for call:  Patient is returning a call from Dr. Hopper  Information relayed to patient:  None, no message found.  Patient has additional questions:  Yes  If YES, what are your questions/concerns: Dr. Hopper asked when patient was available tomorrow.  Patient relayed he is available tomorrow anytime after 9 am.  Okay to leave a detailed message?: Yes [phone]

## 2021-05-28 NOTE — TELEPHONE ENCOUNTER
Please call [facility] to obtain most recent colonoscopy results.  Also, [facility] was supposed to call the patient to schedule FibroScan.  Is any way that we can assist with this?

## 2021-05-28 NOTE — PROGRESS NOTES
[facility] Hematology and Oncology Progress Note    Patient: Hilario Meeks  MRN: [id number]  Date of Service: 04/25/2019      Reason for Visit    Chief Complaint   Patient presents with     HE Cancer       Assessment and Plan  Cancer Staging  No matching staging information was found for the patient.    ECOG Performance   ECOG Performance Status: 1     Distress Assessment  Distress Assessment Score: No distress    Pain  Currently in Pain: No/denies    #. CLL- Trejo stage 2.   Based on is absolute lymphocytosis > 5000, peripheral blood flow cytometry finding of CD5 positive lambda light chain restricted monoclonal B-cell population, informed him that it is consistent with chronic lymphocytic leukemia.  No palpable lymphadenopathy, or at least we evaluated by CT scan of abdomen and pelvis.  He has splenomegaly.  We discussed that his splenomegaly and mild thrombocytopenia is likely related to CLL.  I do not feel that his fatty liver and bladder issue are related to CLL.   We discussed about the biology of the CLL, indication and timing of treatment for CLL etc.    #.  IgG lambda monoclonal gammopathy.   He does not have clear evidence of myeloma defining illness.  M spike was 1.4.  Explained to him that it is consistent with monoclonal gammopathy of undetermined significance.  Noted IgG level of 2300.  Serum free light chain ratio was 0.04.  I discussed about the spectrum of plasma cell disease from MGUS to multiple myeloma.      Because of the 2 hematologic disease that we are dealing, I advised to proceed with unilateral bone marrow biopsy and aspiration.  I explained to him that I would request CLL FISH study from the marrow as he does not have significant lymphocytosis in the peripheral blood that diagnostic accuracy might be challenging.    Plan:  -Unilateral bone marrow biopsy and aspiration.  He can definitely wait until resolved.  -Low up with me about 10 days after completion of bone marrow biopsy to review  the result    Problem List    1. CLL (chronic lymphocytic leukemia) (H)  lidocaine (PF) 10 mg/mL (1 %) injection 10 mL (XYLOCAINE-MPF)    Bone Marrow Biopsy and Exam    Flow cytometry    HM1(CBC and Differential)    Chromosome Analysis, Bone Marrow    Bone Marrow Biopsy    Bone Marrow Aspiration    LORazepam tablet 1 mg (ATIVAN)    oxyCODONE-acetaminophen 5-325 mg 1 tablet (PERCOCET/ENDOCET)    naloxone injection 0.2-0.4 mg (NARCAN)    naloxone injection 0.2-0.4 mg (NARCAN)    CANCELED: CLL FISH study - Misc. Lab Test   2. MGUS (monoclonal gammopathy of unknown significance)  lidocaine (PF) 10 mg/mL (1 %) injection 10 mL (XYLOCAINE-MPF)    Bone Marrow Biopsy and Exam    Flow cytometry    HM1(CBC and Differential)    Chromosome Analysis, Bone Marrow    Bone Marrow Biopsy    Bone Marrow Aspiration    LORazepam tablet 1 mg (ATIVAN)    oxyCODONE-acetaminophen 5-325 mg 1 tablet (PERCOCET/ENDOCET)    naloxone injection 0.2-0.4 mg (NARCAN)    naloxone injection 0.2-0.4 mg (NARCAN)   3. Encounter for other screening for genetic and chromosomal anomalies   Chromosome Analysis, Bone Marrow   4. Splenomegaly        ______________________________________________________________________________    History of Present Illness    Mr. Meeks presents today accompanied by his wife to review the recent blood results.  No changes how he is feeling.  He is still having trouble with training of the urinary bladder and failed trial.  He still have a urinary catheter placed.    Pain Status  Currently in Pain: No/denies    Review of Systems    Oncology Nurse Assessment/CMA Intake: Constitutional  Constitutional (WDL): All constitutional elements are within defined limits  Neurosensory  Neurosensory (WDL): All neurosensory elements are within defined limits  Eye   Eye Disorder (WDL): All eye disorder elements are within defined limits  Ear  Ear Disorder (WDL): All ear disorder elements are within defined  limits  Cardiovascular  Cardiovascular (WDL): All cardiovascular elements are within defined limits  Pulmonary  Respiratory (WDL): Within Defined Limits  Gastrointestinal  Gastrointestinal (WDL): All gastrointestinal elements are within defined limits  Genitourinary  Genitourinary (WDL): Exceptions to WDL(catheter-F/U appt 5/1/19)  Urinary Frequency: None  Urinary Retention: None  Urinary Tract Pain: None  Lymphatic  Lymph (WDL): All lymph disorder elements are within defined limits  Musculoskeletal and Connective Tissue  Musculoskeletal and Connetive Tissue Disorders (WDL): All Musculoskeletal and Connetive Tissue Disorder elements are within defined limits  Integumentary  Integumentary (WDL): All integumentary elements are within defined limits  Patient Coping  Patient Coping: Accepting  Distress Assessment  Distress Assessment Score: No distress  Accompanied by  Accompanied by: Family Member(Wife, Luz)  Oral Chemo Adherence         Past History  History reviewed. No pertinent past medical history.    Physical Exam    Recent Vitals 4/25/2019   Height -   Weight 172 lbs 11 oz   BSA (m2) 2.03 m2   /88   Pulse 51   Temp 98   Temp src 1   SpO2 100     General: alert, awake, not in acute distress. Thin male.  HEENT: Head: Normal, normocephalic, atraumatic.  Eye: Normal external eye, conjunctiva, lids cornea, YARITZA.  Ears:  Non-tender.  Nose: Normal external nose, mucus membranes and septum.  Pharynx: Dental Hygiene adequate. Normal buccal mucosa. Normal pharynx.  Neck / Thyroid: Supple, no masses, nodes, nodules or enlargement.  Lymphatics: No abnormally enlarged lymph nodes.  Chest: Normal chest wall and respirations. Clear to auscultation.  Heart: S1 S2 RRR, no murmur.   Abdomen: abdomen is soft without significant tenderness, masses, organomegaly or guarding  Extremities: normal strength, tone, and muscle mass  Skin: normal. no rash or abnormalities  CNS: non focal.    Lab Results    Recent Results (from  the past 168 hour(s))   Immunofixation Electrophoresis, Urine   Result Value Ref Range    Immunofixation Electropheresis, Urine       Monoclonal IgG-lambda immunoglobulin present.     Two free monoclonal lambda light chains present.     Path ICD: R16.1     Interpreted By: Oc Muñoz MD        Imaging    No results found.    TT: 30 minutes and more than 50% was spent on coordination and counseling of care.    Signed by: Alpa Pedersen MD

## 2021-05-28 NOTE — PROGRESS NOTES
Bone marrow biopsy discharge instructions given and explained to patient and patient's wife, Luz.  Instructed not to drive, drink alcohol, or operate dangerous machinery today.  Instructed to watch bone marrow biopsy site for bleeding and infection.  Both verbalized understanding.  No bleeding noted on dressing. Patient denies pain.  Patient ambulatory to BR-steady of feet.  Patient discharged home via w/c. Assisted to car by GABE Mojica CMA. Accompanied home by wife/FARTUN Martinez RN

## 2021-05-28 NOTE — PROGRESS NOTES
"[facility] Hematology and Oncology Progress Note    Patient: Hilario Meeks  MRN: [id number]  Date of Service: 05/14/2019      Reason for Visit    Chief Complaint   Patient presents with     HE Cancer     Malignant Hematology       Assessment and Plan  Cancer Staging  No matching staging information was found for the patient.    ECOG Performance   ECOG Performance Status: 1     Distress Assessment  Distress Assessment Score: Unable to rate(\"nervous about results-that's it\")    Pain  Currently in Pain: No/denies    #. CLL- Trejo stage 2   Based on is absolute lymphocytosis > 5000, peripheral blood flow cytometry finding of CD5 positive lambda light chain restricted monoclonal B-cell population, informed him that it is consistent with chronic lymphocytic leukemia.  No palpable lymphadenopathy, or at least we evaluated by CT scan of abdomen and pelvis.  He has splenomegaly.  We discussed that his splenomegaly and mild thrombocytopenia is likely related to CLL.  I do not feel that his fatty liver and bladder issue are related to CLL.   I reviewed the bone marrow pathology report and CLL and plasma cell molecular studies in detail with the patient and his wife. That all favored CLL than plasma cell disease. No evidence of myeloma.    Treatment will be surveillance at this point.    Follow up in 4 months with labs and exam. He is advised to call me sooner with any concerns.    #. IgG lambda MGUS   M spike 1.4 g/dl at Dx. No myeloma defining illness. Will follow SPEP and Ig G in 4 months with other CLL labs.    Treatment will be surveillance.     Problem List    1. CLL (chronic lymphocytic leukemia) (H)  Comprehensive Metabolic Panel    HM1(CBC and Differential)    Lactate Dehydrogenase (LDH)      ______________________________________________________________________________    History of Present Illness    Mr. Meeks presents today accompanied by his wife to review bone marrow biopsy result.   No new concerns. He is " "frustruated that his bladder function has not returned yet. He is going to see a urology at the  on 5/29/19.    Pain Status  Currently in Pain: No/denies    Review of Systems    Oncology Nurse Assessment/CMA Intake: Constitutional  Constitutional (WDL): All constitutional elements are within defined limits  Neurosensory  Neurosensory (WDL): All neurosensory elements are within defined limits  Eye   Eye Disorder (WDL): All eye disorder elements are within defined limits  Ear  Ear Disorder (WDL): All ear disorder elements are within defined limits  Cardiovascular  Cardiovascular (WDL): All cardiovascular elements are within defined limits  Pulmonary  Respiratory (WDL): Within Defined Limits  Gastrointestinal  Gastrointestinal (WDL): All gastrointestinal elements are within defined limits  Genitourinary  Genitourinary (WDL): Exceptions to WDL(Li catheter)  Urinary Frequency: None  Urinary Retention: None  Urinary Tract Pain: None  Lymphatic  Lymph (WDL): All lymph disorder elements are within defined limits  Musculoskeletal and Connective Tissue  Musculoskeletal and Connetive Tissue Disorders (WDL): All Musculoskeletal and Connetive Tissue Disorder elements are within defined limits  Integumentary     Patient Coping  Patient Coping: Accepting;Open/discussion  Distress Assessment  Distress Assessment Score: Unable to rate(\"nervous about results-that's it\")  Accompanied by  Accompanied by: Family Member  Oral Chemo Adherence         Past History  No past medical history on file.    Physical Exam    Recent Vitals 5/14/2019   Height -   Weight 172 lbs 6 oz   BSA (m2) 2.03 m2   /82   Pulse 50   Temp 98.2   Temp src 1   SpO2 97   Some recent data might be hidden     General: alert, awake, not in acute distress  HEENT: Head: Normal, normocephalic, atraumatic.  Eye: Normal external eye, conjunctiva, lids cornea, YARITZA.  Ears:  Non-tender.  Nose: Normal external nose, mucus membranes and septum.  Pharynx: Dental " Hygiene adequate. Normal buccal mucosa. Normal pharynx.  Neck / Thyroid: Supple, no masses, nodes, nodules or enlargement.  Lymphatics: No abnormally enlarged lymph nodes.  Chest: Normal chest wall and respirations. Clear to auscultation.  Heart: S1 S2 RRR, no murmur.   Abdomen: abdomen is soft without significant tenderness, masses, organomegaly or guarding. Still has piper catheter.  Extremities: normal strength, tone, and muscle mass  Skin: normal. no rash or abnormalities  CNS: non focal.    Lab Results    No results found for this or any previous visit (from the past 168 hour(s)).    Imaging    No results found.    Signed by: Alpa Pedersen MD

## 2021-05-28 NOTE — PROGRESS NOTES
Patient here today for bone marrow biopsy results with Dr. Pedersen for CLL/FARTUN Martinez, LAYA

## 2021-05-28 NOTE — TELEPHONE ENCOUNTER
Test Results  Who is calling?:  Patient  Who ordered the test:  See message below  Type of test: Lab  Date of test:  See message below  Where was the test performed:  Multiple sites  What are your questions/concerns?:  Patient stated Matthew Hopper MD sent to him to see urology, gastroenterology, and hematology. Patient stated he had tests done and he has not heard from anyone. Patient would like Matthew Hopper MD to call him back and if he has heard anything from them.  Okay to leave a detailed message?:  No   [phone]

## 2021-05-30 NOTE — TELEPHONE ENCOUNTER
Ok to place derm referral or would you like to see patient first? Thank you.    Shania Connelly, CMA

## 2021-06-02 VITALS — WEIGHT: 176 LBS | BODY MASS INDEX: 22.59 KG/M2 | HEIGHT: 74 IN

## 2021-06-02 VITALS — BODY MASS INDEX: 21.94 KG/M2 | WEIGHT: 172.7 LBS

## 2021-06-02 VITALS — WEIGHT: 172.4 LBS | BODY MASS INDEX: 21.9 KG/M2

## 2021-06-02 VITALS — HEIGHT: 74 IN | BODY MASS INDEX: 21.92 KG/M2 | WEIGHT: 170.8 LBS

## 2021-06-03 VITALS
BODY MASS INDEX: 22.71 KG/M2 | WEIGHT: 176.9 LBS | TEMPERATURE: 98.3 F | OXYGEN SATURATION: 99 % | SYSTOLIC BLOOD PRESSURE: 117 MMHG | HEART RATE: 57 BPM | DIASTOLIC BLOOD PRESSURE: 67 MMHG

## 2021-06-03 NOTE — PROGRESS NOTES
[facility] Hematology and Oncology Progress Note    Patient: Hilario Meeks  MRN: [id number]  Date of Service: 11/06/2019      Reason for Visit    Chief Complaint   Patient presents with     HE Cancer     Malignant Hematology       Assessment and Plan  Cancer Staging  No matching staging information was found for the patient.    ECOG Performance   ECOG Performance Status: 0     Distress Assessment  Distress Assessment Score: No distress    Pain  Currently in Pain: No/denies    #. CLL, Trejo stage 2.  Diagnosed in April 2019. Del 13q, trisomy 12.  Complex cytogenetics.   Based on is absolute lymphocytosis > 5000, peripheral blood flow cytometry finding of CD5 positive lambda light chain restricted monoclonal B-cell population, diagnosis was made with chronic lymphocytic leukemia.  No palpable lymphadenopathy clinically and CT scan of abdomen and pelvis at the time of diagnosis. He has splenomegaly.  We discussed that his splenomegaly and mild thrombocytopenia is likely related to CLL.  Completed bone marrow biopsy at diagnosis in May 2019 and it confirmed hypercellular marrow involved by CLL/SLL of 70% with CLL FISH study was positive for deletion 13 q. and trisomy 12.  Cytogenetics was complex.   He is clinically well without clinical evidence of CLL progression.  I reviewed his labs and it showed slightly progression of lymphocytosis to 9.7K with stable normal hemoglobin and mildly low platelet count.  Putting together, he does not need any immediate intervention for CLL.  He was glad to hear that.    Continue observation.    Follow-up labs and exam in 6 months.     #.  IgG lambda MGUS   M spike 1.4 g/dl at Dx. No myeloma defining illness.    Will follow SPEP and Ig G from today.   Continue observation.    Problem List    1. CLL (chronic lymphocytic leukemia) (H)  HM1(CBC and Differential)    Comprehensive Metabolic Panel      ______________________________________________________________________________    History  of Present Illness    Mr. Meeks presents today accompanied by his wife.  No concern.  Had sinus infection.  Denies any frequent infections.  He completed his prostate surgery for BPH with urinary retention and doing well.  No prostate cancer.    Pain Status  Currently in Pain: No/denies    Review of Systems    Oncology Nurse Assessment/CMA Intake: Constitutional  Constitutional (WDL): Exceptions to WDL  Fatigue: None  Fever: None  Chills: None  Weight Gain: 5 - <10% from baseline(4#)  Weight Loss: None  Neurosensory  Neurosensory (WDL): All neurosensory elements are within defined limits  Eye   Eye Disorder (WDL): Assessment not pertinent to visit  Ear  Ear Disorder (WDL): Assessment not pertinent to visit  Cardiovascular  Cardiovascular (WDL): All cardiovascular elements are within defined limits  Pulmonary  Respiratory (WDL): Within Defined Limits  Gastrointestinal  Gastrointestinal (WDL): All gastrointestinal elements are within defined limits  Genitourinary  Genitourinary (WDL): All genitourinary elements are within defined limits(prostate surgery 6/19, doing very good)  Lymphatic  Lymph (WDL): All lymph disorder elements are within defined limits  Musculoskeletal and Connective Tissue  Musculoskeletal and Connetive Tissue Disorders (WDL): All Musculoskeletal and Connetive Tissue Disorder elements are within defined limits  Integumentary  Integumentary (WDL): All integumentary elements are within defined limits  Patient Coping  Patient Coping: Open/discussion  Distress Assessment  Distress Assessment Score: No distress  Accompanied by  Accompanied by: Family Member  Oral Chemo Adherence         Past History  History reviewed. No pertinent past medical history.    Physical Exam    Recent Vitals 11/6/2019   Height -   Weight 176 lbs 14 oz   BSA (m2) 2.05 m2   /67   Pulse 57   Temp 98.3   Temp src 1   SpO2 99   Some recent data might be hidden     General: alert, awake, not in acute distress  HEENT: Head:  Normal, normocephalic, atraumatic.  Eye: Normal external eye, conjunctiva, lids cornea, YARITZA.  Ears:  Non-tender.  Nose: Normal external nose, mucus membranes and septum.  Pharynx: Dental Hygiene adequate. Normal buccal mucosa. Normal pharynx.  Neck / Thyroid: Supple, no masses, nodes, nodules or enlargement.  Lymphatics: No abnormally enlarged lymph nodes.  Chest: Normal chest wall and respirations. Clear to auscultation.  Heart: S1 S2 RRR, no murmur.   Abdomen: abdomen is soft without significant tenderness, masses, organomegaly or guarding  Extremities: normal strength, tone, and muscle mass  Skin: normal. no rash or abnormalities  CNS: non focal.    Lab Results    Recent Results (from the past 168 hour(s))   Comprehensive Metabolic Panel   Result Value Ref Range    Sodium 134 (L) 136 - 145 mmol/L    Potassium 4.0 3.5 - 5.0 mmol/L    Chloride 100 98 - 107 mmol/L    CO2 30 22 - 31 mmol/L    Anion Gap, Calculation 4 (L) 5 - 18 mmol/L    Glucose 94 70 - 125 mg/dL    BUN 16 8 - 22 mg/dL    Creatinine 0.76 0.70 - 1.30 mg/dL    GFR MDRD Af Amer >60 >60 mL/min/1.73m2    GFR MDRD Non Af Amer >60 >60 mL/min/1.73m2    Bilirubin, Total 0.8 0.0 - 1.0 mg/dL    Calcium 8.4 (L) 8.5 - 10.5 mg/dL    Protein, Total 7.0 6.0 - 8.0 g/dL    Albumin 3.4 (L) 3.5 - 5.0 g/dL    Alkaline Phosphatase 61 45 - 120 U/L    AST 20 0 - 40 U/L    ALT 17 0 - 45 U/L   Lactate Dehydrogenase (LDH)   Result Value Ref Range    LD (LDH) 181 125 - 220 U/L   HM1 (CBC with Diff)   Result Value Ref Range    WBC 12.4 (H) 4.0 - 11.0 thou/uL    RBC 4.36 (L) 4.40 - 6.20 mill/uL    Hemoglobin 14.1 14.0 - 18.0 g/dL    Hematocrit 41.5 40.0 - 54.0 %    MCV 95 80 - 100 fL    MCH 32.3 27.0 - 34.0 pg    MCHC 34.0 32.0 - 36.0 g/dL    RDW 12.6 11.0 - 14.5 %    Platelets 124 (L) 140 - 440 thou/uL    MPV 9.2 8.5 - 12.5 fL   Manual Differential   Result Value Ref Range    Total Neutrophils % 19 (L) 50 - 70 %    Lymphocytes % 78 (H) 20 - 40 %    Monocytes % 2 2 - 10 %     Eosinophils %  1 0 - 6 %    Basophils % 0 0 - 2 %    Total Neutrophils Absolute 2.4 2.0 - 7.7 thou/ul    Lymphocytes Absolute 9.7 (H) 0.8 - 4.4 thou/uL    Monocytes Absolute 0.2 0.0 - 0.9 thou/uL    Eosinophils Absolute 0.1 0.0 - 0.4 thou/uL    Basophils Absolute 0.0 0.0 - 0.2 thou/uL    Reactive Lymphocytes 1+ (!) Negative    Platelet Estimate Decreased (!) Normal       Imaging    No results found.    Signed by: Alpa Pedersen MD

## 2021-06-04 VITALS
HEART RATE: 56 BPM | DIASTOLIC BLOOD PRESSURE: 66 MMHG | OXYGEN SATURATION: 99 % | TEMPERATURE: 97.8 F | WEIGHT: 172 LBS | BODY MASS INDEX: 21.85 KG/M2 | SYSTOLIC BLOOD PRESSURE: 121 MMHG

## 2021-06-04 VITALS
HEART RATE: 71 BPM | HEIGHT: 74 IN | OXYGEN SATURATION: 97 % | SYSTOLIC BLOOD PRESSURE: 106 MMHG | BODY MASS INDEX: 21.3 KG/M2 | WEIGHT: 166 LBS | DIASTOLIC BLOOD PRESSURE: 72 MMHG

## 2021-06-05 VITALS
HEART RATE: 54 BPM | OXYGEN SATURATION: 99 % | BODY MASS INDEX: 21.91 KG/M2 | SYSTOLIC BLOOD PRESSURE: 133 MMHG | DIASTOLIC BLOOD PRESSURE: 86 MMHG | TEMPERATURE: 97.8 F | WEIGHT: 172.5 LBS

## 2021-06-09 NOTE — PROGRESS NOTES
Assessment and Plan:   1. Routine general medical examination at a health care facility  This is a 70-year-old male with issues as discussed below    2. CLL (chronic lymphocytic leukemia) (H)  Follows with hematology upcoming labs in the fall    3. ASCVD (arteriosclerotic cardiovascular disease)  Discussed at length.  Discussed secondary prevention.  He eats a healthy diet exercise regularly.  Discussed reduction and heart attacks with statins which she declines.  Also discussed aspirin.  He will get back to me if he changes his mind.    4. BPH, s/p TURP 2019, [facility]  Doing well post surgery  - Basic Metabolic Panel    5. Immunization due  - Pneumococcal polysaccharide vaccine 23-valent 1 yo or older, subq/IM    6. Screening for hyperlipidemia  - Lipid Cascade    7. Abdominal aortic aneurysm (AAA) without rupture (H)-follow-up ultrasound 2022  As above    The patient's current medical problems were reviewed.    The following health maintenance schedule was reviewed with the patient and provided in printed form in the after visit summary:   Health Maintenance   Topic Date Due     INFLUENZA VACCINE RULE BASED (1) 08/01/2020     ZOSTER VACCINES (2 of 2) 09/15/2020     MEDICARE ANNUAL WELLNESS VISIT  07/21/2021     FALL RISK ASSESSMENT  07/21/2021     COLORECTAL CANCER SCREENING  04/16/2022     LIPID  03/14/2024     ADVANCE CARE PLANNING  03/14/2024     TD 18+ HE  07/21/2030     HEPATITIS C SCREENING  Completed     PNEUMOCOCCAL IMMUNIZATION 65+ HIGH/HIGHEST RISK  Completed     HEPATITIS B VACCINES  Aged Out        Subjective:   Chief Complaint: Hilario Meeks is an 70 y.o. male here for an Annual Wellness visit.   HPI: This 70-year-old man comes in for annual wellness visit.  His medical history is reviewed come electronic medical record was obtained reflectance note.  Over the last year he has been seen by urology for BPH with urinary retention.  He underwent debulking surgery and is doing well.  Urinating  "spontaneously.  No significant incontinence.  Also had mildly elevated white blood cell count with mild splenomegaly.  Saw hematology and was diagnosed with CLL.  Monitoring off medication.  Imaging studies did show some plaque in the aorta with mild aneurysm.    Review of Systems:   Please see above.  The rest of the review of systems are negative for all systems.    Patient Care Team:  Matthew Hopper MD as PCP - General (Internal Medicine)  Alpa Pedersen MD as Physician (Hematology and Oncology)  Matthew Hopper MD as Assigned PCP     Patient Active Problem List   Diagnosis     CLL (chronic lymphocytic leukemia) (H)     BPH, s/p TURP 2019, [facility]     ASCVD (arteriosclerotic cardiovascular disease)     No past medical history on file.   Past Surgical History:   Procedure Laterality Date     BONE MARROW BIOPSY  2019     TRANSURETHRAL RESECTION OF PROSTATE  2019    HOLEP, BPH, [facility]      Family History   Problem Relation Age of Onset     No Medical Problems Mother      Aortic aneurysm Father         thoracic     Obesity Brother      Hypertension Brother      No Medical Problems Son         Makes [employer] [place], [employer]     No Medical Problems Daughter         Genetic professor      Social History     Socioeconomic History     Marital status:      Spouse name: Not on file     Number of children: Not on file     Years of education: Not on file     Highest education level: Not on file   Occupational History     Not on file   Social Needs     Financial resource strain: Not on file     Food insecurity     Worry: Not on file     Inability: Not on file     Transportation needs     Medical: Not on file     Non-medical: Not on file   Tobacco Use     Smoking status: Never Smoker     Smokeless tobacco: Never Used   Substance and Sexual Activity     Alcohol use: Yes     Alcohol/week: 10.0 standard drinks     Types: 10 Standard drinks or equivalent per week     Comment: \"14 lunch & dinner\"     Drug use: " "No     Sexual activity: Not Currently     Partners: Female   Lifestyle     Physical activity     Days per week: Not on file     Minutes per session: Not on file     Stress: Not on file   Relationships     Social connections     Talks on phone: Not on file     Gets together: Not on file     Attends Nondenominational service: Not on file     Active member of club or organization: Not on file     Attends meetings of clubs or organizations: Not on file     Relationship status: Not on file     Intimate partner violence     Fear of current or ex partner: Not on file     Emotionally abused: Not on file     Physically abused: Not on file     Forced sexual activity: Not on file   Other Topics Concern     Not on file   Social History Narrative    Lives with his wife, Luz.  Retired professor of pragmatism, [employer].  PhD [employer].  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        No current outpatient medications on file.     No current facility-administered medications for this visit.       Objective:   Vital Signs:   Visit Vitals  /72 (Patient Site: Left Arm, Patient Position: Sitting, Cuff Size: Adult Regular)   Pulse 71   Ht 6' 2.4\" (1.89 m)   Wt 166 lb (75.3 kg)   SpO2 97%   BMI 21.08 kg/m           VisionScreening:  No exam data present     PHYSICAL EXAM  EYES: Eyelids, conjunctiva, and sclera were normal. Pupils were normal. Cornea, iris, and lens were normal bilaterally.  HEAD, EARS, NOSE, MOUTH, AND THROAT: Head and face were normal. Hearing was normal to voice and the ears were normal to external exam. Nose appearance was normal and there was no discharge. Oropharynx was normal.  NECK: Neck appearance was normal. There were no neck masses and the thyroid was not enlarged.  RESPIRATORY: Breathing pattern was normal and the chest moved symmetrically.  Percussion/auscultatory percussion was normal.  Lung sounds were normal and there were no abnormal sounds.  CARDIOVASCULAR: Heart rate and " rhythm were normal.  S1 and S2 were normal and there were no extra sounds or murmurs. Peripheral pulses in arms and legs were normal.  Jugular venous pressure was normal.  There was no peripheral edema.  GASTROINTESTINAL: The abdomen was normal in contour.  Bowel sounds were present.  Percussion detected no organ enlargement or tenderness.  Palpation detected no tenderness, mass, or enlarged organs.   MUSCULOSKELETAL: Skeletal configuration was normal and muscle mass was normal for age. Joint appearance was overall normal.  LYMPHATIC: There were no enlarged nodes.  SKIN/HAIR/NAILS: Skin color was normal.  There were no skin lesions.  Hair and nails were normal.  NEUROLOGIC: The patient was alert and oriented to person, place, time, and circumstance. Speech was normal. Cranial nerves were normal. Motor strength was normal for age. The patient was normally coordinated.  PSYCHIATRIC:  Mood and affect were normal and the patient had normal recent and remote memory. The patient's judgment and insight were normal.      Assessment Results 7/21/2020   Activities of Daily Living No help needed   Instrumental Activities of Daily Living No help needed   Get Up and Go Score Less than 12 seconds   Mini Cog Total Score 5     A Mini-Cog score of 0-2 suggests the possibility of dementia, score of 3-5 suggests no dementia    Identified Health Risks:

## 2021-06-09 NOTE — PATIENT INSTRUCTIONS - HE
Advance Directive  Patient s advance directive was discussed and I am comfortable with the patient s wishes.  Patient Education   Personalized Prevention Plan  You are due for the preventive services outlined below.  Your care team is available to assist you in scheduling these services.  If you have already completed any of these items, please share that information with your care team to update in your medical record.  Health Maintenance   Topic Date Due     INFLUENZA VACCINE RULE BASED (1) 08/01/2020     ZOSTER VACCINES (2 of 2) 09/15/2020     MEDICARE ANNUAL WELLNESS VISIT  07/21/2021     FALL RISK ASSESSMENT  07/21/2021     COLORECTAL CANCER SCREENING  04/16/2022     LIPID  03/14/2024     ADVANCE CARE PLANNING  03/14/2024     TD 18+ HE  07/21/2030     HEPATITIS C SCREENING  Completed     PNEUMOCOCCAL IMMUNIZATION 65+ HIGH/HIGHEST RISK  Completed     HEPATITIS B VACCINES  Aged Out

## 2021-06-11 NOTE — PROGRESS NOTES
[facility] Hematology and Oncology Progress Note    Patient: Hilario Meeks  MRN: [id number]  Date of Service: 08/27/2020        Reason for Visit    Chief Complaint   Patient presents with     HE Cancer       Assessment and Plan  Cancer Staging  No matching staging information was found for the patient.    ECOG Performance   ECOG Performance Status: 0     Distress Assessment  Distress Assessment Score: No distress    Pain  Currently in Pain: No/denies    #. CLL, Trejo stage 2   Clinically as well as lab parameters stable. Continue observation.   Follow up in 6 months with repeat labs.    #. IgG lambda MGUS   M spike stable. IgG is gradually rising. No myeloma defining illness.   Follow up with labs in 6 months. If stable on next, will plan to move follow up for 12 months.     Problem List    1. CLL (chronic lymphocytic leukemia) (H)     2. MGUS (monoclonal gammopathy of unknown significance)        ______________________________________________________________________________    History of Present Illness    Mr. Meeks presented today for routine follow up.  No concerns. No fever. No swollen glands. No bone pain.     Pain Status  Currently in Pain: No/denies    Review of Systems    Oncology Nurse Assessment/CMA Intake: Constitutional  Constitutional (WDL): All constitutional elements are within defined limits  Neurosensory  Neurosensory (WDL): All neurosensory elements are within defined limits  Eye   Eye Disorder (WDL): Assessment not pertinent to visit  Ear  Ear Disorder (WDL): Assessment not pertinent to visit  Cardiovascular  Cardiovascular (WDL): All cardiovascular elements are within defined limits  Pulmonary  Respiratory (WDL): Within Defined Limits  Gastrointestinal  Gastrointestinal (WDL): All gastrointestinal elements are within defined limits  Genitourinary  Genitourinary (WDL): All genitourinary elements are within defined limits  Lymphatic  Lymph (WDL): All lymph disorder elements are within defined  limits  Musculoskeletal and Connective Tissue  Musculoskeletal and Connetive Tissue Disorders (WDL): Exceptions to WDL  Arthralgia: Mild pain(left shoulder - chronic)  Integumentary  Integumentary (WDL): All integumentary elements are within defined limits  Patient Coping  Patient Coping: Open/discussion  Distress Assessment  Distress Assessment Score: No distress  Accompanied by  Accompanied by: Alone  Oral Chemo Adherence         Past History  History reviewed. No pertinent past medical history.    Physical Exam    Recent Vitals 8/27/2020   Height -   Weight 172 lbs   BSA (m2) 2.02 m2   /66   Pulse 56   Temp 97.8   Temp src 1   SpO2 99   Some recent data might be hidden       General: alert, awake, not in acute distress  HEENT: Head: Normal, normocephalic, atraumatic.  Eye: Normal external eye, conjunctiva, lids cornea, YARITZA.  Ears:  Non-tender.  Nose: Normal external nose, mucus membranes and septum.  Pharynx: Dental Hygiene adequate. Normal buccal mucosa. Normal pharynx.  Neck / Thyroid: Supple, no masses, nodes, nodules or enlargement.  Lymphatics: No abnormally enlarged lymph nodes.  Chest: Normal chest wall and respirations. Clear to auscultation.  Heart: S1 S2 RRR, no murmur.   Abdomen: abdomen is soft without significant tenderness, masses, organomegaly or guarding  Extremities: normal strength, tone, and muscle mass  Skin: normal. no rash or abnormalities  CNS: non focal.    Lab Results    Recent Results (from the past 168 hour(s))   Comprehensive Metabolic Panel   Result Value Ref Range    Sodium 132 (L) 136 - 145 mmol/L    Potassium 4.4 3.5 - 5.0 mmol/L    Chloride 97 (L) 98 - 107 mmol/L    CO2 31 22 - 31 mmol/L    Anion Gap, Calculation 4 (L) 5 - 18 mmol/L    Glucose 69 (L) 70 - 125 mg/dL    BUN 13 8 - 22 mg/dL    Creatinine 0.83 0.70 - 1.30 mg/dL    GFR MDRD Af Amer >60 >60 mL/min/1.73m2    GFR MDRD Non Af Amer >60 >60 mL/min/1.73m2    Bilirubin, Total 0.8 0.0 - 1.0 mg/dL    Calcium 8.6 8.5 -  10.5 mg/dL    Protein, Total 7.2 6.0 - 8.0 g/dL    Albumin 3.2 (L) 3.5 - 5.0 g/dL    Alkaline Phosphatase 57 45 - 120 U/L    AST 22 0 - 40 U/L    ALT 18 0 - 45 U/L   Electrophoresis, Protein, Serum   Result Value Ref Range    Albumin % 56.2 51.0 - 67.0 %    Albumin  3.9 3.2 - 4.7 g/dL    Alpha 1 % 2.5 2.0 - 4.0 %    Alpha 1 0.2 0.1 - 0.3 g/dL    Alpha 2 % 8.4 5.0 - 13.0 %    Alpha 2 0.6 0.4 - 0.9 g/dL    % Beta 8.2 (L) 10.0 - 17.0 %    Beta 0.6 (L) 0.7 - 1.2 g/dL    Gamma Globulin % 24.7 (H) 9.0 - 20.0 %    Gamma Globulin 1.7 (H) 0.6 - 1.4 g/dL    ELP Comment       A symmetric peak is present in the gamma globulin region of the tracing, and a clonal band is present in the corresponding region of the gel strip, indicating a monoclonal globulin. Serum immunofixation can further identify the abnormal protein.      Protein, Total 6.9 6.0 - 8.0 g/dL    Monoclonal Peak 1.3 g/dL    Path ICD: D47.2     Interpreted By: Monty Hermosillo MD     Immunoglobulin G   Result Value Ref Range    Immunoglobulin G 2,402 (H) 700-1,700 mg/dL   HM1 (CBC with Diff)   Result Value Ref Range    WBC 7.2 4.0 - 11.0 thou/uL    RBC 4.43 4.40 - 6.20 mill/uL    Hemoglobin 14.2 14.0 - 18.0 g/dL    Hematocrit 41.4 40.0 - 54.0 %    MCV 94 80 - 100 fL    MCH 32.1 27.0 - 34.0 pg    MCHC 34.3 32.0 - 36.0 g/dL    RDW 12.7 11.0 - 14.5 %    Platelets 153 140 - 440 thou/uL    MPV 8.7 8.5 - 12.5 fL   Manual Differential   Result Value Ref Range    Total Neutrophils % 38 (L) 50 - 70 %    Lymphocytes % 55 (H) 20 - 40 %    Monocytes % 5 2 - 10 %    Eosinophils %  1 0 - 6 %    Basophils % 1 0 - 2 %    Immature Granulocyte % - Manual 0 <=0 %    Total Neutrophils Absolute 2.7 2.0 - 7.7 thou/ul    Lymphocytes Absolute 4.0 0.8 - 4.4 thou/uL    Monocytes Absolute 0.4 0.0 - 0.9 thou/uL    Eosinophils Absolute 0.1 0.0 - 0.4 thou/uL    Basophils Absolute 0.1 0.0 - 0.2 thou/uL    Immature Granulocyte Absolute - Manual 0.0 <=0.0 thou/uL    Platelet Estimate Normal  Normal       Imaging    No results found.      Signed by: Alpa Pedersen MD

## 2021-06-11 NOTE — TELEPHONE ENCOUNTER
Reviewed labs from 8/30/20 with Dr. Pedersen.  Slight increase Igg. M spike stable.  Lymphocytes normalized which is good. Continue to monitor labs. Recheck labs with provider visit in 6 mo. Called patient with this message. Patient verbalized understanding and asked to be placed on recall list.  Message to schedulers/FARTUN Martinez RN

## 2021-06-14 NOTE — TELEPHONE ENCOUNTER
Okay to add this patient to the wait list where we would call him if we have available vaccines left over from a day?. We need 's approval for patients to be on the list. Thanks.

## 2021-06-15 NOTE — PROGRESS NOTES
[facility] Hematology and Oncology Progress Note    Patient: Hilario Meeks  MRN: [id number]  Date of Service: 03/04/2021        Reason for Visit    Chief Complaint   Patient presents with     HE Cancer     Malignant Hematology       Assessment and Plan  Cancer Staging  No matching staging information was found for the patient.    ECOG Performance   ECOG Performance Status: 0     Distress Assessment  Distress Assessment Score: No distress    Pain  Currently in Pain: No/denies    #. CLL, Trejo stage 2   Clinically well without any constitutional or B symptoms concerning for CLL progression.  Labs were reviewed.  His total WBC is in normal range with mild lymphocytosis.  Hemoglobin is normal.  Platelet is normal as well.  Complete metabolic profile showed chronic mild hyponatremia and otherwise unremarkable.  No clinical indication to initiate CLL directed therapy at this point.  Recommended continue observation.    Because of the overall stability for the last couple years, recommended follow-up in 1 year with repeat labs.      #.  IgG lambda MGUS    No clinical symptoms suggestive of progression of plasma cell disease.  Will repeat labs in about 1 year to coordinate with CLL follow-up.  He agrees with the plan.    Problem List    1. CLL (chronic lymphocytic leukemia) (H)     2. MGUS (monoclonal gammopathy of unknown significance)        ______________________________________________________________________________    History of Present Illness    Mr. Meeks presented by himself today.   He was diagnosed with [place]'s disease a year ago. Intermittent skin rash but no recurrent since. He has topical meds Rxed by his dermatologist.  No fever, swollen glands, No fever. No swollen glands. No bone pain.     Pain Status  Currently in Pain: No/denies    Review of Systems    Oncology Nurse Assessment/CMA Intake: Constitutional  Constitutional (WDL): All constitutional elements are within defined  "limits  Neurosensory  Neurosensory (WDL): All neurosensory elements are within defined limits  Eye   Eye Disorder (WDL): All eye disorder elements are within defined limits  Ear  Ear Disorder (WDL): All ear disorder elements are within defined limits  Cardiovascular  Cardiovascular (WDL): All cardiovascular elements are within defined limits  Pulmonary  Respiratory (WDL): Within Defined Limits  Gastrointestinal  Gastrointestinal (WDL): Exceptions to WDL  Anorexia: None  Constipation: Occasional or intermittent symptoms, occasional use of stool softeners, laxatives, dietary modification, or enema  Diarrhea: None  Dysphagia: None  Esophagitis: None  Nausea: None  Pharyngitis: None  Vomiting: None  Dysgeusia: None  Dry Mouth: None  Genitourinary  Genitourinary (WDL): All genitourinary elements are within defined limits  Lymphatic  Lymph (WDL): All lymph disorder elements are within defined limits  Musculoskeletal and Connective Tissue  Musculoskeletal and Connetive Tissue Disorders (WDL): All Musculoskeletal and Connetive Tissue Disorder elements are within defined limits  Integumentary  Integumentary (WDL): Exceptions to WDL  Alopecia: None  Rash Maculo-Papular: Macules/papules covering <10% BSA with or without symptoms (e.g., pruritus, burning, tightness)(\"[place]'s rash\" abd-intermittent)  Pruritus: Mild or localized, topical intervention indicated(abd)  Urticaria: None  Palmar-Plantar Erythrodysesthesia Syndrome: None  Flushing: None  Patient Coping  Patient Coping: Accepting;Open/discussion  Distress Assessment  Distress Assessment Score: No distress  Accompanied by  Accompanied by: Alone  Oral Chemo Adherence         Past History  History reviewed. No pertinent past medical history.    Physical Exam    Recent Vitals 3/4/2021   Height -   Weight 172 lbs 8 oz   BSA (m2) 2.03 m2   /86   Pulse 54   Temp 97.8   Temp src 1   SpO2 99   Some recent data might be hidden       General: alert, awake, not in acute " distress  HEENT: Head: Normal, normocephalic, atraumatic.  Eye: Normal external eye, conjunctiva, lids cornea, YARITZA.  Nose: Normal external nose, mucus membranes and septum.  Pharynx: Normal buccal mucosa. Normal pharynx.  Neck / Thyroid: Supple, no masses, nodes, nodules or enlargement.  Lymphatics: No abnormally enlarged lymph nodes.  Chest: Normal chest wall and respirations. Clear to auscultation.  Heart: S1 S2 RRR, no murmur.   Abdomen: abdomen is soft without significant tenderness, masses, organomegaly or guarding  Extremities: normal strength, tone, and muscle mass  Skin: normal. no rash or abnormalities  CNS: non focal.    Lab Results    Recent Results (from the past 168 hour(s))   Comprehensive Metabolic Panel   Result Value Ref Range    Sodium 130 (L) 136 - 145 mmol/L    Potassium 3.8 3.5 - 5.0 mmol/L    Chloride 95 (L) 98 - 107 mmol/L    CO2 28 22 - 31 mmol/L    Anion Gap, Calculation 7 5 - 18 mmol/L    Glucose 83 70 - 125 mg/dL    BUN 13 8 - 22 mg/dL    Creatinine 0.74 0.70 - 1.30 mg/dL    GFR MDRD Af Amer >60 >60 mL/min/1.73m2    GFR MDRD Non Af Amer >60 >60 mL/min/1.73m2    Bilirubin, Total 0.9 0.0 - 1.0 mg/dL    Calcium 8.4 (L) 8.5 - 10.5 mg/dL    Protein, Total 7.3 6.0 - 8.0 g/dL    Albumin 3.5 3.5 - 5.0 g/dL    Alkaline Phosphatase 56 45 - 120 U/L    AST 19 0 - 40 U/L    ALT 14 0 - 45 U/L   HM1 (CBC with Diff)   Result Value Ref Range    WBC 7.4 4.0 - 11.0 thou/uL    RBC 4.29 (L) 4.40 - 6.20 mill/uL    Hemoglobin 14.0 14.0 - 18.0 g/dL    Hematocrit 39.7 (L) 40.0 - 54.0 %    MCV 93 80 - 100 fL    MCH 32.6 27.0 - 34.0 pg    MCHC 35.3 32.0 - 36.0 g/dL    RDW 13.1 11.0 - 14.5 %    Platelets 163 140 - 440 thou/uL    MPV 8.9 8.5 - 12.5 fL   Manual Differential   Result Value Ref Range    Total Neutrophils % 29 (L) 50 - 70 %    Lymphocytes % 66 (H) 20 - 40 %    Monocytes % 4 2 - 10 %    Eosinophils %  1 0 - 6 %    Basophils % 0 0 - 2 %    Immature Granulocyte % - Manual 0 <=0 %    Total Neutrophils  Absolute 2.1 2.0 - 7.7 thou/ul    Lymphocytes Absolute 4.9 (H) 0.8 - 4.4 thou/uL    Monocytes Absolute 0.3 0.0 - 0.9 thou/uL    Eosinophils Absolute 0.1 0.0 - 0.4 thou/uL    Basophils Absolute 0.0 0.0 - 0.2 thou/uL    Immature Granulocyte Absolute - Manual 0.0 <=0.0 thou/uL    Platelet Estimate Normal Normal    Reactive Lymphocytes 2+ (!) Negative       Imaging    No results found.      Signed by: Alpa Pedersen MD

## 2021-06-19 NOTE — LETTER
Letter by Alpa Pedersen MD at      Author: Alpa Pedersen MD Service: -- Author Type: --    Filed:  Encounter Date: 9/16/2019 Status: (Other)         09/16/19    Hilario MARTIN Jeanna  [address] [place] [address]    Dear Hilario:  Thank you for allowing us to participate in your care.  Your health and progress is important to us.  The scheduling department has made several attempts to schedule your follow-up exams.  Our attempts to schedule this appointment have been unsuccessful.    Please contact us at [phone] to schedule your follow-up exam or to let us know how we may assist you.        Sincerely,        [facility] / Hematology Care Team            CC: Alpa Pedersen MD, 09/16/19, 9:19 AM

## 2021-06-19 NOTE — LETTER
Letter by Nayeli Ricks RN at      Author: Nayeli Ricks RN Service: -- Author Type: --    Filed:  Encounter Date: 4/5/2019 Status: (Other)       Dear Hilario Meeks    Thank you for choosing [facility] for your care.  We are committed to providing you with the highest quality and compassionate healthcare services.  The following information pertains to your first appointment with our clinic.    Date/Time of appointment:  Tuesday, April 16th 2019 at 10:45 am.      Note: Please arrive by 10:45 am.  This allows time to complete forms, possible labs and nursing assessment.    Name of your Physician:  Alpa Pedersen MD    What to bring to your appointment:    Completed Patient History/Initial Nursing Assessment and Medication/Allergy List (these forms were sent to you).    Any paperwork or films from your physician that we have asked you to bring.    Your current insurance card(s).    Parking:    Please refer to the map included to direct you to [facility].    You can park in any visitor/patient parking area you choose. There is no charge for parking at [facility].     Enter the hospital at the front/main entrance.      Please check in with our  representatives who will escort you to the clinic located in [address] of the [facility].    We hope these instructions are helpful to you.  If you have any questions or concerns, please call us at [phone].  It is our pleasure to assist you.    Warm Regards,  Nayeli Ricks  Nurse Navigator  [phone]

## 2021-06-20 NOTE — LETTER
Letter by Alpa Pedersen MD at      Author: Alpa Pedersen MD Service: -- Author Type: --    Filed:  Encounter Date: 5/11/2020 Status: (Other)                   Office Hours: Monday - Friday 8:00 - 4:30PM    Hilario Meeks  [address] [place] [address]           May 11, 2020      Dear Hilario:    It looks as though you are due to see Dr. Pedersen. If you would like to schedule this please call [phone]         Sincerely,      [facility] [place]

## 2021-06-20 NOTE — LETTER
Letter by Alpa Pedersen MD at      Author: Alpa Pedersen MD Service: -- Author Type: --    Filed:  Encounter Date: 3/20/2020 Status: (Other)                   Office Hours: Monday - Friday 8:00 - 4:30PM    Hilario Meeks  [address] [place] [address]           March 20, 2020      Dear Hilario:    It looks as though you are due to see Dr. Pedersen in May. If you would like to schedule this please call [phone]         Sincerely,        [facility]

## 2021-06-21 NOTE — LETTER
Letter by Alpa Pedersen MD at      Author: Alpa Pedersen MD Service: -- Author Type: --    Filed:  Encounter Date: 1/19/2021 Status: (Other)                   Office Hours: Monday - Friday 8:00 - 4:30PM    Hilario Meeks  [address] [place] [address]           January 19, 2021      Dear Hilario:    It looks as though you are due to see Dr. Pedersen in March. If you would like to schedule this please call [phone]         Sincerely,        [facility]

## 2021-06-25 NOTE — PROGRESS NOTES
Assessment and Plan:   1. Routine general medical examination at a health care facility  General healthy 68-year-old male with minimal medical care.  Issues as discussed below.  He did receive a pneumococcal vaccine today.  I recommended a tetanus vaccine as well as vaccine at the pharmacy.  Recommend annual influenza vaccine.    2. Abdominal fullness  Etiology uncertain.  No evidence of hernia.  Some asymmetry on exam.  Check labs and imaging as below  - CT Abdomen Pelvis Without Oral With IV Contrast; Future  - Comprehensive Metabolic Panel  - Urinalysis Macroscopic  - Urine,Microscopic    3. Screen for colon cancer  He will do [place] guard    4. Screening for diabetes mellitus    5. Screening for hyperlipidemia  - Lipid Cascade    6. Need for hepatitis C screening test  - Hepatitis C Antibody (Anti-HCV)    7. Screening for colon cancer  - Cologuard    8. Immunization due  - Pneumococcal conjugate vaccine 13-valent 6wks-17yrs; >50yrs    9. Advanced care planning/counseling discussion  We had a 16-minute discussion today about advance care planning.  His medical decision-maker event that he was unable to make decisions will be his wife Luz.  He is not sure if he would like to be full code or DNR.  This point he will remain full code.  He like to discuss with his wife.  He would not want ongoing life-sustaining treatment if he had no chance for meaningful recovery.    The patient's current medical problems were reviewed.    I have had an Advance Directives discussion with the patient.  The following health maintenance schedule was reviewed with the patient and provided in printed form in the after visit summary:   Health Maintenance   Topic Date Due     TD 18+ HE  [birth year]18/1968     COLONOSCOPY  04/18/2001     ZOSTER VACCINES (1 of 2) 04/18/2001     PNEUMOCOCCAL POLYSACCHARIDE VACCINE AGE 65 AND OVER  04/18/2016     INFLUENZA VACCINE RULE BASED (1) 08/01/2018     FALL RISK ASSESSMENT  03/14/2020     ADVANCE  DIRECTIVES DISCUSSED WITH PATIENT  03/14/2024     PNEUMOCOCCAL CONJUGATE VACCINE FOR ADULTS (PCV13 OR PREVNAR)  Completed        Subjective:   Chief Complaint: Hilario Meeks is an 68 y.o. male here for an Annual Wellness visit.   HPI: This 68-year-old man comes in to [facility] care, annual wellness visit and evaluation of abdominal fullness.  His medical history is reviewed, electronic medical records obtained reflectance no.  Overall he is quite healthy.  Exercises regularly.  Not much medical care in the past.  Recently over the past 3-4 months has noticed some fullness in his right lower abdomen.  Not having significant pain.  He has been doing some yoga with his wife and when he puts any sort of pressure there he feels like something is bulging out.  He has had no nausea vomiting.  No constipation or diarrhea.  No urinary symptoms.  No fever or chills.  No night sweats.  He is lost a little bit of weight but he relates this to his move from [facility] to [place].  No history of colonoscopy.  No family history of colon cancer.  He does have a family history of aortic aneurysm in his father who was a heavy smoker.    Review of Systems:   Please see above.  The rest of the review of systems are negative for all systems.    Patient Care Team:  Matthew Hopper MD as PCP - General (Internal Medicine)     There is no problem list on file for this patient.    No past medical history on file.   Past Surgical History:   Procedure Laterality Date     NO PAST SURGERIES        Family History   Problem Relation Age of Onset     No Medical Problems Mother      Aortic aneurysm Father         thoracic     Obesity Brother      Hypertension Brother      No Medical Problems Son         Makes [employer] [place] [place]     No Medical Problems Daughter         Genetic professor      Social History     Socioeconomic History     Marital status:      Spouse name: Not on file     Number of children: Not on file     Years of  "education: Not on file     Highest education level: Not on file   Occupational History     Not on file   Social Needs     Financial resource strain: Not on file     Food insecurity:     Worry: Not on file     Inability: Not on file     Transportation needs:     Medical: Not on file     Non-medical: Not on file   Tobacco Use     Smoking status: Never Smoker     Smokeless tobacco: Never Used   Substance and Sexual Activity     Alcohol use: Yes     Alcohol/week: 6.0 oz     Types: 10 Standard drinks or equivalent per week     Drug use: No     Sexual activity: Yes     Partners: Female   Lifestyle     Physical activity:     Days per week: Not on file     Minutes per session: Not on file     Stress: Not on file   Relationships     Social connections:     Talks on phone: Not on file     Gets together: Not on file     Attends Sabianism service: Not on file     Active member of club or organization: Not on file     Attends meetings of clubs or organizations: Not on file     Relationship status: Not on file     Intimate partner violence:     Fear of current or ex partner: Not on file     Emotionally abused: Not on file     Physically abused: Not on file     Forced sexual activity: Not on file   Other Topics Concern     Not on file   Social History Narrative    Lives with his wife, Luz.  Retired professor of pragmatism, [employer].  PhD [employer].  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        No current outpatient medications on file.     No current facility-administered medications for this visit.       Objective:   Vital Signs:   Visit Vitals  /80 (Patient Site: Right Arm, Patient Position: Sitting, Cuff Size: Adult Regular)   Pulse (!) 50   Ht 6' 2\" (1.88 m)   Wt 176 lb (79.8 kg)   SpO2 99%   BMI 22.60 kg/m         VisionScreening:  No exam data present     PHYSICAL EXAM  EYES: Eyelids, conjunctiva, and sclera were normal. Pupils were normal. Cornea, iris, and lens were normal " bilaterally.  HEAD, EARS, NOSE, MOUTH, AND THROAT: Head and face were normal. Hearing was normal to voice and the ears were normal to external exam. Nose appearance was normal and there was no discharge. Oropharynx was normal.  Cerumen impaction of the left ear removed with warm water lavage  NECK: Neck appearance was normal. There were no neck masses and the thyroid was not enlarged.  RESPIRATORY: Breathing pattern was normal and the chest moved symmetrically.  Percussion/auscultatory percussion was normal.  Lung sounds were normal and there were no abnormal sounds.  CARDIOVASCULAR: Heart rate and rhythm were normal.  S1 and S2 were normal and there were no extra sounds or murmurs. Peripheral pulses in arms and legs were normal.  Jugular venous pressure was normal.  There was no peripheral edema.  GASTROINTESTINAL: The abdomen was normal in contour with out evidence of hernia in the right lower abdomen.  He does have some asymmetry and it is difficult to assess whether he has a mass in the lower abdomen or if it is musculature..  Bowel sounds were present.  Percussion detected no organ enlargement or tenderness.  Palpation detected no tenderness, mass, or enlarged organs.   MUSCULOSKELETAL: Skeletal configuration was normal and muscle mass was normal for age. Joint appearance was overall normal.  LYMPHATIC: There were no enlarged nodes.  SKIN/HAIR/NAILS: Skin color was normal.  There were no skin lesions.  Hair and nails were normal.  NEUROLOGIC: The patient was alert and oriented to person, place, time, and circumstance. Speech was normal. Cranial nerves were normal. Motor strength was normal for age. The patient was normally coordinated.  PSYCHIATRIC:  Mood and affect were normal and the patient had normal recent and remote memory. The patient's judgment and insight were normal.      Assessment Results 3/14/2019   Activities of Daily Living No help needed   Instrumental Activities of Daily Living No help needed    Get Up and Go Score Less than 12 seconds   Mini Cog Total Score 5     A Mini-Cog score of 0-2 suggests the possibility of dementia, score of 3-5 suggests no dementia    Identified Health Risks:     Patient's advanced directive was discussed and I am comfortable with the patient's wishes.

## 2021-06-25 NOTE — PATIENT INSTRUCTIONS - HE
Advance Directive  Patient s advance directive was discussed and I am comfortable with the patient s wishes.  Patient Education   Personalized Prevention Plan  You are due for the preventive services outlined below.  Your care team is available to assist you in scheduling these services.  If you have already completed any of these items, please share that information with your care team to update in your medical record.  Health Maintenance   Topic Date Due     TD 18+ HE  04/18/1969     COLONOSCOPY  04/18/2001     ZOSTER VACCINES (1 of 2) 04/18/2001     PNEUMOCOCCAL POLYSACCHARIDE VACCINE AGE 65 AND OVER  04/18/2016     INFLUENZA VACCINE RULE BASED (1) 08/01/2018     FALL RISK ASSESSMENT  03/14/2020     ADVANCE DIRECTIVES DISCUSSED WITH PATIENT  03/14/2024     PNEUMOCOCCAL CONJUGATE VACCINE FOR ADULTS (PCV13 OR PREVNAR)  Completed

## 2021-07-03 NOTE — ADDENDUM NOTE
Addendum Note by Matthew Knapp MD at 3/14/2019  9:40 AM     Author: Matthew Knapp MD Service: -- Author Type: Physician    Filed: 3/22/2019 12:42 PM Encounter Date: 3/14/2019 Status: Signed    : Matthew Knapp MD (Physician)    Addended by: MATTHEW KNAPP on: 3/22/2019 12:42 PM        Modules accepted: Orders

## 2021-07-03 NOTE — ADDENDUM NOTE
Addendum Note by Ai Cohen at 5/2/2019  8:30 AM     Author: Ai Cohen Service: -- Author Type:     Filed: 5/8/2019 10:22 AM Encounter Date: 5/2/2019 Status: Signed    : Ai Cohen ()    Addended by: AI COHEN on: 5/8/2019 10:22 AM        Modules accepted: Orders

## 2021-07-03 NOTE — ADDENDUM NOTE
Addendum Note by Ai Cohen at 5/2/2019  8:30 AM     Author: Ai Cohen Service: -- Author Type:     Filed: 7/19/2019  1:05 PM Encounter Date: 5/2/2019 Status: Signed    : Ai Cohen ()    Addended by: AI COHEN on: 7/19/2019 01:05 PM        Modules accepted: Orders

## 2021-10-10 ENCOUNTER — HEALTH MAINTENANCE LETTER (OUTPATIENT)
Age: 71
End: 2021-10-10

## 2022-02-23 ENCOUNTER — LAB (OUTPATIENT)
Dept: INFUSION THERAPY | Facility: CLINIC | Age: 72
End: 2022-02-23
Attending: INTERNAL MEDICINE
Payer: COMMERCIAL

## 2022-02-23 ENCOUNTER — MYC MEDICAL ADVICE (OUTPATIENT)
Dept: ONCOLOGY | Facility: CLINIC | Age: 72
End: 2022-02-23

## 2022-02-23 DIAGNOSIS — D47.2 MGUS (MONOCLONAL GAMMOPATHY OF UNKNOWN SIGNIFICANCE): ICD-10-CM

## 2022-02-23 DIAGNOSIS — C91.10 CLL (CHRONIC LYMPHOCYTIC LEUKEMIA) (H): Primary | ICD-10-CM

## 2022-02-23 LAB
ALBUMIN SERPL-MCNC: 3.5 G/DL (ref 3.5–5)
ALP SERPL-CCNC: 67 U/L (ref 45–120)
ALT SERPL W P-5'-P-CCNC: 15 U/L (ref 0–45)
ANION GAP SERPL CALCULATED.3IONS-SCNC: 8 MMOL/L (ref 5–18)
AST SERPL W P-5'-P-CCNC: 19 U/L (ref 0–40)
BASOPHILS # BLD AUTO: 0.1 10E3/UL (ref 0–0.2)
BASOPHILS NFR BLD AUTO: 1 %
BILIRUB SERPL-MCNC: 0.7 MG/DL (ref 0–1)
BUN SERPL-MCNC: 12 MG/DL (ref 8–28)
CALCIUM SERPL-MCNC: 8.6 MG/DL (ref 8.5–10.5)
CHLORIDE BLD-SCNC: 101 MMOL/L (ref 98–107)
CO2 SERPL-SCNC: 26 MMOL/L (ref 22–31)
CREAT SERPL-MCNC: 0.81 MG/DL (ref 0.7–1.3)
EOSINOPHIL # BLD AUTO: 0.2 10E3/UL (ref 0–0.7)
EOSINOPHIL NFR BLD AUTO: 3 %
ERYTHROCYTE [DISTWIDTH] IN BLOOD BY AUTOMATED COUNT: 13.2 % (ref 10–15)
GFR SERPL CREATININE-BSD FRML MDRD: >90 ML/MIN/1.73M2
GLUCOSE BLD-MCNC: 55 MG/DL (ref 70–125)
HCT VFR BLD AUTO: 43.9 % (ref 40–53)
HGB BLD-MCNC: 15.1 G/DL (ref 13.3–17.7)
IGA SERPL-MCNC: 18 MG/DL (ref 65–400)
IGG SERPL-MCNC: 2729 MG/DL (ref 700–1700)
IGM SERPL-MCNC: 39 MG/DL (ref 60–280)
IMM GRANULOCYTES # BLD: 0 10E3/UL
IMM GRANULOCYTES NFR BLD: 0 %
LYMPHOCYTES # BLD AUTO: 3.1 10E3/UL (ref 0.8–5.3)
LYMPHOCYTES NFR BLD AUTO: 45 %
MCH RBC QN AUTO: 32 PG (ref 26.5–33)
MCHC RBC AUTO-ENTMCNC: 34.4 G/DL (ref 31.5–36.5)
MCV RBC AUTO: 93 FL (ref 78–100)
MONOCYTES # BLD AUTO: 0.6 10E3/UL (ref 0–1.3)
MONOCYTES NFR BLD AUTO: 9 %
NEUTROPHILS # BLD AUTO: 2.9 10E3/UL (ref 1.6–8.3)
NEUTROPHILS NFR BLD AUTO: 42 %
NRBC # BLD AUTO: 0 10E3/UL
NRBC BLD AUTO-RTO: 0 /100
PLATELET # BLD AUTO: 165 10E3/UL (ref 150–450)
POTASSIUM BLD-SCNC: 4.5 MMOL/L (ref 3.5–5)
PROT SERPL-MCNC: 8 G/DL (ref 6–8)
RBC # BLD AUTO: 4.72 10E6/UL (ref 4.4–5.9)
SODIUM SERPL-SCNC: 135 MMOL/L (ref 136–145)
TOTAL PROTEIN SERUM FOR ELP: 7.7 G/DL (ref 6–8)
WBC # BLD AUTO: 6.7 10E3/UL (ref 4–11)

## 2022-02-23 PROCEDURE — 84165 PROTEIN E-PHORESIS SERUM: CPT | Mod: 26 | Performed by: PATHOLOGY

## 2022-02-23 PROCEDURE — 36415 COLL VENOUS BLD VENIPUNCTURE: CPT

## 2022-02-23 PROCEDURE — 82784 ASSAY IGA/IGD/IGG/IGM EACH: CPT

## 2022-02-23 PROCEDURE — 85025 COMPLETE CBC W/AUTO DIFF WBC: CPT

## 2022-02-23 PROCEDURE — 86334 IMMUNOFIX E-PHORESIS SERUM: CPT

## 2022-02-23 PROCEDURE — 80053 COMPREHEN METABOLIC PANEL: CPT

## 2022-02-23 PROCEDURE — 83521 IG LIGHT CHAINS FREE EACH: CPT

## 2022-02-23 PROCEDURE — 86334 IMMUNOFIX E-PHORESIS SERUM: CPT | Mod: 26 | Performed by: PATHOLOGY

## 2022-02-23 PROCEDURE — 84155 ASSAY OF PROTEIN SERUM: CPT

## 2022-02-23 PROCEDURE — 84165 PROTEIN E-PHORESIS SERUM: CPT | Mod: TC

## 2022-02-23 NOTE — PROGRESS NOTES
Called Hilario and KELLEY on his mobile number requesting a return phone call back regarding labs. Contact information was provided/Jillian Curran RN  Called Hilario's wife, Luz, and KELLEY requesting her to ask Hilario o give our office a call regarding lab results.  Sent My Chart Message to Hilario regarding critical lab, requested return call or response to confirm that he received message

## 2022-02-23 NOTE — PROGRESS NOTES
DATE:  2/23/2022   TIME OF RECEIPT FROM LAB:  1053  LAB TEST:  Blood Glucose  LAB VALUE:  55  RESULTS GIVEN WITH READ-BACK TO (PROVIDER):  Sr. Alpa Yuen Thaw    TIME LAB VALUE REPORTED TO PROVIDER:   10:55

## 2022-02-23 NOTE — TELEPHONE ENCOUNTER
Pat called writer and relayed that he received a phone message. Relayed that after he left clinic we were notified that his blood glucose level was a critical low, 55. He has not eaten since his lab drawn, he did eat breakfast this morning.He denies that he is skaky, Instructed to eat a well balanced meal with protein, carbs, healthy fat, also encouraged a glass of juice to increase his BG and to help stabilize. He expressed understanding/Jillian LAYA Curran

## 2022-02-24 LAB
KAPPA LC FREE SER-MCNC: 0.92 MG/DL (ref 0.33–1.94)
KAPPA LC FREE/LAMBDA FREE SER NEPH: 0.03 {RATIO} (ref 0.26–1.65)
LAMBDA LC FREE SERPL-MCNC: 27.39 MG/DL (ref 0.57–2.63)

## 2022-02-25 LAB
ALBUMIN PERCENT: 53.7 % (ref 51–67)
ALBUMIN SERPL ELPH-MCNC: 4.1 G/DL (ref 3.2–4.7)
ALPHA 1 PERCENT: 2.2 % (ref 2–4)
ALPHA 2 PERCENT: 7.8 % (ref 5–13)
ALPHA1 GLOB SERPL ELPH-MCNC: 0.2 G/DL (ref 0.1–0.3)
ALPHA2 GLOB SERPL ELPH-MCNC: 0.6 G/DL (ref 0.4–0.9)
B-GLOBULIN SERPL ELPH-MCNC: 0.7 G/DL (ref 0.7–1.2)
BETA PERCENT: 8.6 % (ref 10–17)
GAMMA GLOB SERPL ELPH-MCNC: 2.1 G/DL (ref 0.6–1.4)
GAMMA GLOBULIN PERCENT: 27.7 % (ref 9–20)
MONOCLONAL PEAK: 1.8 G/DL
PATH ICD:: ABNORMAL
PATH ICD:: NORMAL
PROT PATTERN SERPL ELPH-IMP: ABNORMAL
PROT PATTERN SERPL IFE-IMP: NORMAL
REVIEWING PATHOLOGIST: ABNORMAL
REVIEWING PATHOLOGIST: NORMAL
TOTAL PROTEIN SERUM FOR ELP (SYNCED VALUE): 7.7 G/DL

## 2022-03-02 ENCOUNTER — ONCOLOGY VISIT (OUTPATIENT)
Dept: ONCOLOGY | Facility: CLINIC | Age: 72
End: 2022-03-02
Attending: INTERNAL MEDICINE
Payer: COMMERCIAL

## 2022-03-02 VITALS
TEMPERATURE: 97.7 F | DIASTOLIC BLOOD PRESSURE: 85 MMHG | OXYGEN SATURATION: 100 % | WEIGHT: 181.3 LBS | HEIGHT: 74 IN | BODY MASS INDEX: 23.27 KG/M2 | SYSTOLIC BLOOD PRESSURE: 148 MMHG | RESPIRATION RATE: 16 BRPM | HEART RATE: 57 BPM

## 2022-03-02 DIAGNOSIS — D47.2 MGUS (MONOCLONAL GAMMOPATHY OF UNKNOWN SIGNIFICANCE): ICD-10-CM

## 2022-03-02 DIAGNOSIS — E16.2 HYPOGLYCEMIA: ICD-10-CM

## 2022-03-02 DIAGNOSIS — C91.10 CHRONIC LYMPHOCYTIC LEUKEMIA (H): Primary | ICD-10-CM

## 2022-03-02 LAB
FASTING STATUS PATIENT QL REPORTED: NO
GLUCOSE BLD-MCNC: 88 MG/DL (ref 70–125)

## 2022-03-02 PROCEDURE — 99214 OFFICE O/P EST MOD 30 MIN: CPT | Performed by: INTERNAL MEDICINE

## 2022-03-02 PROCEDURE — 82947 ASSAY GLUCOSE BLOOD QUANT: CPT | Performed by: INTERNAL MEDICINE

## 2022-03-02 PROCEDURE — G0463 HOSPITAL OUTPT CLINIC VISIT: HCPCS

## 2022-03-02 PROCEDURE — 36415 COLL VENOUS BLD VENIPUNCTURE: CPT | Performed by: INTERNAL MEDICINE

## 2022-03-02 ASSESSMENT — PAIN SCALES - GENERAL: PAINLEVEL: NO PAIN (0)

## 2022-03-02 NOTE — LETTER
"    3/2/2022         RE: Hilario Meeks  [address]  [place] [address]        Dear Colleague,    Thank you for referring your patient, Hilario Meeks, to the [facility] [place]. Please see a copy of my visit note below.    Oncology Rooming Note    March 2, 2022 9:54 AM   Hilario Meeks is a 71 year old male who presents for:    Chief Complaint   Patient presents with     Immune Deficiency     Initial Vitals: BP (!) 148/85 (Patient Position: Sitting)   Pulse 57   Temp 97.7  F (36.5  C) (Oral)   Resp 16   Ht 1.89 m (6' 2.41\")   Wt 82.2 kg (181 lb 4.8 oz)   SpO2 100%   BMI 23.02 kg/m   Estimated body mass index is 23.02 kg/m  as calculated from the following:    Height as of this encounter: 1.89 m (6' 2.41\").    Weight as of this encounter: 82.2 kg (181 lb 4.8 oz). Body surface area is 2.08 meters squared.  No Pain (0) Comment: Data Unavailable   No LMP for male patient.  Allergies reviewed: Yes  Medications reviewed: Yes    Medications: Medication refills not needed today.  Pharmacy name entered into [employer]: [id number] - [place], [place] - [address]    Clinical concerns:Blood work low glucose  Print out of results. 3  Started going back to gym x 2 days      Terrell Aguiar LPN                [facility] Hematology and Oncology Progress Note    Patient: Hilario Meeks  MRN: [id number]  Date of Service: Mar 2, 2022         Reason for Visit    Chief Complaint   Patient presents with     Immune Deficiency       Assessment and Plan    Cancer Staging  No matching staging information was found for the patient.    ECOG Performance    0 - Independent     Pain  Pain Score: No Pain (0)    #. CLL, Trejo stage 2   Clinically stable without any signs and symptoms suggestive of CLL progression or B symptoms.  No indication for CLL treatment.  Labs were reviewed.  WBC is in normal range and no lymphocytosis this time.  Hemoglobin and platelets are normal.  "    Recommended continue clinical observation at this point.  Follow-up labs and provider visit in 1 year regarding CLL.      #.  IgG lambda MGUS    IgG level has continued to rise and now 2700.  Kappa lambda ratio is 0.03 which is stable from a year ago.  M spike is 1.8.  Normal renal function, normal calcium and normal CBC at this point.    It appears that MGUS is slowly progressing.  I recommended 6 months follow-up labs and clinical exam.  If there is continued rise in M spike, we may need to proceed with unilateral bone marrow biopsy and work-up for multiple myeloma.    #.  Hypoglycemia, asymptomatic   He was noted to have hypoglycemia a week ago when he came in for routine lab.  He was asymptomatic.  Rechecked glucose today and it is in normal range.    Encounter Diagnoses:    Problem List Items Addressed This Visit        Oncology Diagnoses    Chronic lymphocytic leukemia (H) - Primary    MGUS (monoclonal gammopathy of unknown significance)    Relevant Orders    CBC with Platelets & Differential    Comprehensive metabolic panel    Protein electrophoresis    Immunoglobulins A G and M    Protein Immunofixation Serum    Kappa and lambda light chain      Other Visit Diagnoses     Hypoglycemia        Relevant Orders    Glucose (Completed)             CC: Matthew Hopper MD   ______________________________________________________________________________  Diagnosis  4/2019-CLL, Trejo stage II by mild lymphocytosis, mild thrombocytopenia and splenomegaly.   -IgG lambda monoclonal gammopathy, M spike 1.4, IgG 2300, serum free light chain ratio of 0.04.    Treatment to date  Observation    History of Present Illness    Mr. Hilario Meeks presented today accompanied by his wife.  He is here for routine follow-up.  Since last visit, he does not have any frequent infection, hospitalization.  He restarted back on doing regular exercises and going to the gym again.  He does not have diabetes mellitus.     Review of  "systems  Apart from describing in HPI, the remainder of comprehensive ROS was negative.    Past History    Past Medical History:   Diagnosis Date     BPH (benign prostatic hyperplasia)        Past Surgical History:   Procedure Laterality Date     BONE MARROW BIOPSY, BONE SPECIMEN, NEEDLE/TROCAR  2019     LASER HOLMIUM ENUCLEATION PROSTATE N/A 6/27/2019    Procedure: Holmium Laser Enucleation of the Prostate;  Surgeon: Chester Turner MD;  Location: [facility]     TRANSRECTAL ULTRASONIC, TRANSURETHRAL RESECTION (TUR) OF PROSTATE CYST  2019    HOLEP, BPH, [employer]         Physical Exam    BP (!) 148/85 (Patient Position: Sitting)   Pulse 57   Temp 97.7  F (36.5  C) (Oral)   Resp 16   Ht 1.89 m (6' 2.41\")   Wt 82.2 kg (181 lb 4.8 oz)   SpO2 100%   BMI 23.02 kg/m        General: alert, awake, not in acute distress  HEENT: Head: Normal, normocephalic, atraumatic.  Eye: Normal external eye, conjunctiva, lids cornea, YARITZA.  Nose: Normal external nose, mucus membranes and septum.  Pharynx: Normal buccal mucosa. Normal pharynx.  Neck / Thyroid: Supple, no masses, nodes, nodules or enlargement.  Lymphatics: No abnormally enlarged lymph nodes.  Chest: Normal chest wall and respirations. Clear to auscultation.  Heart: S1 S2 RRR, no murmur.   Abdomen: abdomen is soft without significant tenderness, masses, organomegaly or guarding  Extremities: normal strength, tone, and muscle mass  Skin: normal. no rash or abnormalities  CNS: non focal.    Lab Results    Recent Results (from the past 168 hour(s))   Glucose   Result Value Ref Range    Glucose 88 70 - 125 mg/dL    Patient Fasting > 8hrs? No        Imaging    No results found.      Signed by: Alpa Pedersen MD        Again, thank you for allowing me to participate in the care of your patient.        Sincerely,        Alpa Pedersen MD    "

## 2022-03-02 NOTE — PROGRESS NOTES
[facility] Hematology and Oncology Progress Note    Patient: Hilario Meeks  MRN: [id number]  Date of Service: Mar 2, 2022         Reason for Visit    Chief Complaint   Patient presents with     Immune Deficiency       Assessment and Plan    Cancer Staging  No matching staging information was found for the patient.    ECOG Performance    0 - Independent     Pain  Pain Score: No Pain (0)    #. CLL, Trejo stage 2   Clinically stable without any signs and symptoms suggestive of CLL progression or B symptoms.  No indication for CLL treatment.  Labs were reviewed.  WBC is in normal range and no lymphocytosis this time.  Hemoglobin and platelets are normal.     Recommended continue clinical observation at this point.  Follow-up labs and provider visit in 1 year regarding CLL.      #.  IgG lambda MGUS    IgG level has continued to rise and now 2700.  Kappa lambda ratio is 0.03 which is stable from a year ago.  M spike is 1.8.  Normal renal function, normal calcium and normal CBC at this point.    It appears that MGUS is slowly progressing.  I recommended 6 months follow-up labs and clinical exam.  If there is continued rise in M spike, we may need to proceed with unilateral bone marrow biopsy and work-up for multiple myeloma.    #.  Hypoglycemia, asymptomatic   He was noted to have hypoglycemia a week ago when he came in for routine lab.  He was asymptomatic.  Rechecked glucose today and it is in normal range.    Encounter Diagnoses:    Problem List Items Addressed This Visit        Oncology Diagnoses    Chronic lymphocytic leukemia (H) - Primary    MGUS (monoclonal gammopathy of unknown significance)    Relevant Orders    CBC with Platelets & Differential    Comprehensive metabolic panel    Protein electrophoresis    Immunoglobulins A G and M    Protein Immunofixation Serum    Kappa and lambda light chain      Other Visit Diagnoses     Hypoglycemia        Relevant Orders    Glucose (Completed)        "      CC: Matthew Hopper MD   ______________________________________________________________________________  Diagnosis  4/2019-CLL, Rtejo stage II by mild lymphocytosis, mild thrombocytopenia and splenomegaly.   -IgG lambda monoclonal gammopathy, M spike 1.4, IgG 2300, serum free light chain ratio of 0.04.    Treatment to date  Observation    History of Present Illness    Mr. Hilario Meeks presented today accompanied by his wife.  He is here for routine follow-up.  Since last visit, he does not have any frequent infection, hospitalization.  He restarted back on doing regular exercises and going to the gym again.  He does not have diabetes mellitus.     Review of systems  Apart from describing in HPI, the remainder of comprehensive ROS was negative.    Past History    Past Medical History:   Diagnosis Date     BPH (benign prostatic hyperplasia)        Past Surgical History:   Procedure Laterality Date     BONE MARROW BIOPSY, BONE SPECIMEN, NEEDLE/TROCAR  2019     LASER HOLMIUM ENUCLEATION PROSTATE N/A 6/27/2019    Procedure: Holmium Laser Enucleation of the Prostate;  Surgeon: Chester Turner MD;  Location: [facility]     TRANSRECTAL ULTRASONIC, TRANSURETHRAL RESECTION (TUR) OF PROSTATE CYST  2019    HOLEP, BPH, [employer] [facility]         Physical Exam    BP (!) 148/85 (Patient Position: Sitting)   Pulse 57   Temp 97.7  F (36.5  C) (Oral)   Resp 16   Ht 1.89 m (6' 2.41\")   Wt 82.2 kg (181 lb 4.8 oz)   SpO2 100%   BMI 23.02 kg/m        General: alert, awake, not in acute distress  HEENT: Head: Normal, normocephalic, atraumatic.  Eye: Normal external eye, conjunctiva, lids cornea, YARITZA.  Nose: Normal external nose, mucus membranes and septum.  Pharynx: Normal buccal mucosa. Normal pharynx.  Neck / Thyroid: Supple, no masses, nodes, nodules or enlargement.  Lymphatics: No abnormally enlarged lymph nodes.  Chest: Normal chest wall and respirations. Clear to auscultation.  Heart: S1 S2 RRR, no murmur.   Abdomen: abdomen " is soft without significant tenderness, masses, organomegaly or guarding  Extremities: normal strength, tone, and muscle mass  Skin: normal. no rash or abnormalities  CNS: non focal.    Lab Results    Recent Results (from the past 168 hour(s))   Glucose   Result Value Ref Range    Glucose 88 70 - 125 mg/dL    Patient Fasting > 8hrs? No        Imaging    No results found.      Signed by: Alpa Pedersen MD

## 2022-03-02 NOTE — PROGRESS NOTES
"Oncology Rooming Note    March 2, 2022 9:54 AM   Hilario Meeks is a 71 year old male who presents for:    Chief Complaint   Patient presents with     Immune Deficiency     Initial Vitals: BP (!) 148/85 (Patient Position: Sitting)   Pulse 57   Temp 97.7  F (36.5  C) (Oral)   Resp 16   Ht 1.89 m (6' 2.41\")   Wt 82.2 kg (181 lb 4.8 oz)   SpO2 100%   BMI 23.02 kg/m   Estimated body mass index is 23.02 kg/m  as calculated from the following:    Height as of this encounter: 1.89 m (6' 2.41\").    Weight as of this encounter: 82.2 kg (181 lb 4.8 oz). Body surface area is 2.08 meters squared.  No Pain (0) Comment: Data Unavailable   No LMP for male patient.  Allergies reviewed: Yes  Medications reviewed: Yes    Medications: Medication refills not needed today.  Pharmacy name entered into [employer]: [id number] - [place], [place] - [address] [place] [address]    Clinical concerns:Blood work low glucose  Print out of results. 3  Started going back to gym x 2 days      Terrell Aguiar LPN              "

## 2022-03-03 ENCOUNTER — TELEPHONE (OUTPATIENT)
Dept: ONCOLOGY | Facility: CLINIC | Age: 72
End: 2022-03-03
Payer: COMMERCIAL

## 2022-03-03 NOTE — TELEPHONE ENCOUNTER
Dr. Pedersen checked patient's blood glucose yesterday.  BS=88.  Called patient and left message stating blood glucose WNL. Instructed to call back with further questions or concerns/FARTUN Martinez RN

## 2022-06-23 ENCOUNTER — LAB (OUTPATIENT)
Dept: INFUSION THERAPY | Facility: CLINIC | Age: 72
End: 2022-06-23
Attending: INTERNAL MEDICINE
Payer: COMMERCIAL

## 2022-06-23 DIAGNOSIS — D47.2 MGUS (MONOCLONAL GAMMOPATHY OF UNKNOWN SIGNIFICANCE): ICD-10-CM

## 2022-06-23 LAB
ALBUMIN SERPL-MCNC: 3.3 G/DL (ref 3.5–5)
ALP SERPL-CCNC: 59 U/L (ref 45–120)
ALT SERPL W P-5'-P-CCNC: 17 U/L (ref 0–45)
ANION GAP SERPL CALCULATED.3IONS-SCNC: 6 MMOL/L (ref 5–18)
AST SERPL W P-5'-P-CCNC: 18 U/L (ref 0–40)
BASOPHILS # BLD MANUAL: 0 10E3/UL (ref 0–0.2)
BASOPHILS NFR BLD MANUAL: 0 %
BILIRUB SERPL-MCNC: 0.9 MG/DL (ref 0–1)
BUN SERPL-MCNC: 12 MG/DL (ref 8–28)
CALCIUM SERPL-MCNC: 8.5 MG/DL (ref 8.5–10.5)
CHLORIDE BLD-SCNC: 100 MMOL/L (ref 98–107)
CO2 SERPL-SCNC: 28 MMOL/L (ref 22–31)
CREAT SERPL-MCNC: 0.81 MG/DL (ref 0.7–1.3)
EOSINOPHIL # BLD MANUAL: 0.1 10E3/UL (ref 0–0.7)
EOSINOPHIL NFR BLD MANUAL: 2 %
ERYTHROCYTE [DISTWIDTH] IN BLOOD BY AUTOMATED COUNT: 12.5 % (ref 10–15)
GFR SERPL CREATININE-BSD FRML MDRD: >90 ML/MIN/1.73M2
GLUCOSE BLD-MCNC: 72 MG/DL (ref 70–125)
HCT VFR BLD AUTO: 42.1 % (ref 40–53)
HGB BLD-MCNC: 14.5 G/DL (ref 13.3–17.7)
IGA SERPL-MCNC: 15 MG/DL (ref 65–400)
IGG SERPL-MCNC: 2533 MG/DL (ref 700–1700)
IGM SERPL-MCNC: 33 MG/DL (ref 60–280)
KAPPA LC FREE SER-MCNC: 1 MG/DL (ref 0.33–1.94)
KAPPA LC FREE/LAMBDA FREE SER NEPH: 0.05 {RATIO} (ref 0.26–1.65)
LAMBDA LC FREE SERPL-MCNC: 21.91 MG/DL (ref 0.57–2.63)
LYMPHOCYTES # BLD MANUAL: 2.8 10E3/UL (ref 0.8–5.3)
LYMPHOCYTES NFR BLD MANUAL: 55 %
MCH RBC QN AUTO: 32.1 PG (ref 26.5–33)
MCHC RBC AUTO-ENTMCNC: 34.4 G/DL (ref 31.5–36.5)
MCV RBC AUTO: 93 FL (ref 78–100)
MONOCYTES # BLD MANUAL: 0.3 10E3/UL (ref 0–1.3)
MONOCYTES NFR BLD MANUAL: 5 %
NEUTROPHILS # BLD MANUAL: 1.9 10E3/UL (ref 1.6–8.3)
NEUTROPHILS NFR BLD MANUAL: 38 %
PLAT MORPH BLD: ABNORMAL
PLATELET # BLD AUTO: 159 10E3/UL (ref 150–450)
POTASSIUM BLD-SCNC: 4.2 MMOL/L (ref 3.5–5)
PROT SERPL-MCNC: 7.1 G/DL (ref 6–8)
RBC # BLD AUTO: 4.52 10E6/UL (ref 4.4–5.9)
RBC MORPH BLD: ABNORMAL
SODIUM SERPL-SCNC: 134 MMOL/L (ref 136–145)
TOTAL PROTEIN SERUM FOR ELP: 6.9 G/DL (ref 6–8)
VARIANT LYMPHS BLD QL SMEAR: PRESENT
WBC # BLD AUTO: 5 10E3/UL (ref 4–11)

## 2022-06-23 PROCEDURE — 85007 BL SMEAR W/DIFF WBC COUNT: CPT

## 2022-06-23 PROCEDURE — 82040 ASSAY OF SERUM ALBUMIN: CPT

## 2022-06-23 PROCEDURE — 84165 PROTEIN E-PHORESIS SERUM: CPT | Mod: TC

## 2022-06-23 PROCEDURE — 85027 COMPLETE CBC AUTOMATED: CPT

## 2022-06-23 PROCEDURE — 82784 ASSAY IGA/IGD/IGG/IGM EACH: CPT

## 2022-06-23 PROCEDURE — 86334 IMMUNOFIX E-PHORESIS SERUM: CPT | Mod: 26 | Performed by: PATHOLOGY

## 2022-06-23 PROCEDURE — 36415 COLL VENOUS BLD VENIPUNCTURE: CPT

## 2022-06-23 PROCEDURE — 83521 IG LIGHT CHAINS FREE EACH: CPT | Mod: 59

## 2022-06-23 PROCEDURE — 84155 ASSAY OF PROTEIN SERUM: CPT

## 2022-06-23 PROCEDURE — 84165 PROTEIN E-PHORESIS SERUM: CPT | Mod: 26 | Performed by: PATHOLOGY

## 2022-06-23 PROCEDURE — 86334 IMMUNOFIX E-PHORESIS SERUM: CPT

## 2022-06-23 PROCEDURE — 80053 COMPREHEN METABOLIC PANEL: CPT

## 2022-06-24 LAB
ALBUMIN PERCENT: 53.3 % (ref 51–67)
ALBUMIN SERPL ELPH-MCNC: 3.7 G/DL (ref 3.2–4.7)
ALPHA 1 PERCENT: 2.3 % (ref 2–4)
ALPHA 2 PERCENT: 8.4 % (ref 5–13)
ALPHA1 GLOB SERPL ELPH-MCNC: 0.2 G/DL (ref 0.1–0.3)
ALPHA2 GLOB SERPL ELPH-MCNC: 0.6 G/DL (ref 0.4–0.9)
B-GLOBULIN SERPL ELPH-MCNC: 0.6 G/DL (ref 0.7–1.2)
BETA PERCENT: 8.9 % (ref 10–17)
GAMMA GLOB SERPL ELPH-MCNC: 1.9 G/DL (ref 0.6–1.4)
GAMMA GLOBULIN PERCENT: 27.1 % (ref 9–20)
MONOCLONAL PEAK: 1.5 G/DL
PATH ICD:: ABNORMAL
PATH ICD:: NORMAL
PROT PATTERN SERPL ELPH-IMP: ABNORMAL
PROT PATTERN SERPL IFE-IMP: NORMAL
REVIEWING PATHOLOGIST: ABNORMAL
REVIEWING PATHOLOGIST: NORMAL
TOTAL PROTEIN SERUM FOR ELP (SYNCED VALUE): 6.9 G/DL

## 2022-06-30 ENCOUNTER — ONCOLOGY VISIT (OUTPATIENT)
Dept: ONCOLOGY | Facility: CLINIC | Age: 72
End: 2022-06-30
Attending: INTERNAL MEDICINE
Payer: COMMERCIAL

## 2022-06-30 VITALS
WEIGHT: 176 LBS | DIASTOLIC BLOOD PRESSURE: 90 MMHG | HEIGHT: 75 IN | RESPIRATION RATE: 16 BRPM | OXYGEN SATURATION: 100 % | BODY MASS INDEX: 21.88 KG/M2 | HEART RATE: 55 BPM | SYSTOLIC BLOOD PRESSURE: 140 MMHG

## 2022-06-30 DIAGNOSIS — C91.10 CHRONIC LYMPHOCYTIC LEUKEMIA (H): ICD-10-CM

## 2022-06-30 DIAGNOSIS — D47.2 MGUS (MONOCLONAL GAMMOPATHY OF UNKNOWN SIGNIFICANCE): Primary | ICD-10-CM

## 2022-06-30 PROCEDURE — 99213 OFFICE O/P EST LOW 20 MIN: CPT | Performed by: INTERNAL MEDICINE

## 2022-06-30 PROCEDURE — G0463 HOSPITAL OUTPT CLINIC VISIT: HCPCS

## 2022-06-30 ASSESSMENT — PAIN SCALES - GENERAL: PAINLEVEL: NO PAIN (0)

## 2022-06-30 NOTE — PROGRESS NOTES
"Oncology Rooming Note    June 30, 2022 2:53 PM   Hilario Meeks is a 72 year old male who presents for:    Chief Complaint   Patient presents with     Oncology Clinic Visit     Chronic lymphocytic leukemia, MGUS (monoclonal gammopathy of unknown significance)     Initial Vitals: BP (!) 140/90   Pulse 55   Resp 16   Ht 1.892 m (6' 2.5\")   Wt 79.8 kg (176 lb)   SpO2 100%   BMI 22.29 kg/m   Estimated body mass index is 22.29 kg/m  as calculated from the following:    Height as of this encounter: 1.892 m (6' 2.5\").    Weight as of this encounter: 79.8 kg (176 lb). Body surface area is 2.05 meters squared.  No Pain (0) Comment: Data Unavailable   No LMP for male patient.  Allergies reviewed: Yes  Medications reviewed: Yes    Medications: Medication refills not needed today.  Pharmacy name entered into [employer]: [id number] - [place], [place] - [address] [place] [address]    Clinical concerns: 3 month lab results      Nilda Hoff            "

## 2022-06-30 NOTE — LETTER
"    6/30/2022         RE: Hilario Meeks  [address]  [place] [address]        Dear Colleague,    Thank you for referring your patient, Hilario Meeks, to the [facility]. Please see a copy of my visit note below.    Oncology Rooming Note    June 30, 2022 2:53 PM   Hilario Meeks is a 72 year old male who presents for:    Chief Complaint   Patient presents with     Oncology Clinic Visit     Chronic lymphocytic leukemia, MGUS (monoclonal gammopathy of unknown significance)     Initial Vitals: BP (!) 140/90   Pulse 55   Resp 16   Ht 1.892 m (6' 2.5\")   Wt 79.8 kg (176 lb)   SpO2 100%   BMI 22.29 kg/m   Estimated body mass index is 22.29 kg/m  as calculated from the following:    Height as of this encounter: 1.892 m (6' 2.5\").    Weight as of this encounter: 79.8 kg (176 lb). Body surface area is 2.05 meters squared.  No Pain (0) Comment: Data Unavailable   No LMP for male patient.  Allergies reviewed: Yes  Medications reviewed: Yes    Medications: Medication refills not needed today.  Pharmacy name entered into Reading Room: [id number] - [place], [place] - [address]    Clinical concerns: 3 month lab results      Nilda Hoff              [facility] Hematology and Oncology Progress Note    Patient: Hilario Meeks  MRN: [id number]  Date of Service: Jun 30, 2022         Reason for Visit    Chief Complaint   Patient presents with     Oncology Clinic Visit     Chronic lymphocytic leukemia, MGUS (monoclonal gammopathy of unknown significance)       Assessment and Plan    Cancer Staging  No matching staging information was found for the patient.    ECOG Performance    0 - Independent     Pain  Pain Score: No Pain (0)      #.  IgG lambda MGUS    This is a short interval follow-up due to a significant rise of IgG and M spike from here prior.  He does not have any new clinical symptoms.  I reviewed his labs.  CBC remains within normal range.  Normal " renal function and normal calcium.  No bone pain.  IgG level IgG level is 2500 and monoclonal protein is 1.5.  Kappa and lambda ratio remains stable at 0.05.     I recommended to follow-up labs and provider visit in 6 months and continue to assess clinical and laboratory indication for bone marrow biopsy and treatment.      #.  CLL, Trejo stage 2   Clinically stable without any signs and symptoms suggestive of CLL progression or B symptoms.  In peripheral blood, there is no signs of lymphocytosis at this point.  No indication for CLL treatment.    Continue labs and clinical exam in 6 months as above.    Encounter Diagnoses:    Problem List Items Addressed This Visit     Chronic lymphocytic leukemia (H)    Relevant Orders    CBC with Platelets & Differential    Comprehensive metabolic panel    Protein electrophoresis    Immunoglobulins A G and M    Protein Immunofixation Serum    Kappa and lambda light chain    MGUS (monoclonal gammopathy of unknown significance) - Primary    Relevant Orders    CBC with Platelets & Differential    Comprehensive metabolic panel    Protein electrophoresis    Immunoglobulins A G and M    Protein Immunofixation Serum    Kappa and lambda light chain             CC: Matthew Hopper MD   ______________________________________________________________________________  Diagnosis  4/2019-CLL, Trejo stage II by mild lymphocytosis, mild thrombocytopenia and splenomegaly.  Flow cytometry confirms CD5 positive lambda light chain restricted monoclonal B-cell population (39%)   -IgG lambda monoclonal gammopathy, M spike 1.4, IgG 2300, serum free light chain ratio of 0.04.    Treatment to date  Observation    History of Present Illness    Mr. Hilario Meeks presented today accompanied by his wife, Luz.  He is here to review the result.  He does not have any new health issue.  No frequent infection.  No palpable masses.  No pain in bones.     Review of systems  Apart from describing in HPI, the  "remainder of comprehensive ROS was negative.    Past History    Past Medical History:   Diagnosis Date     BPH (benign prostatic hyperplasia)        Past Surgical History:   Procedure Laterality Date     BONE MARROW BIOPSY, BONE SPECIMEN, NEEDLE/TROCAR  2019     LASER HOLMIUM ENUCLEATION PROSTATE N/A 6/27/2019    Procedure: Holmium Laser Enucleation of the Prostate;  Surgeon: Chester Turner MD;  Location: [facility]     TRANSRECTAL ULTRASONIC, TRANSURETHRAL RESECTION (TUR) OF PROSTATE CYST  2019    HOLEP, BPH, [facility]         Physical Exam    BP (!) 140/90   Pulse 55   Resp 16   Ht 1.892 m (6' 2.5\")   Wt 79.8 kg (176 lb)   SpO2 100%   BMI 22.29 kg/m      General: alert, awake, not in acute distress  HEENT: Head: Normal, normocephalic, atraumatic.  Eye: Normal external eye, conjunctiva, lids cornea, YARITZA.  Pharynx: Normal buccal mucosa. Normal pharynx.  Neck / Thyroid: Supple, no masses, nodes, nodules or enlargement.  Lymphatics: No abnormally enlarged lymph nodes.  Abdomen: abdomen is soft without significant tenderness, masses, organomegaly or guarding  Extremities: normal strength, tone, and muscle mass  Skin: normal. no rash or abnormalities  CNS: non focal.    Lab Results    No results found for this or any previous visit (from the past 168 hour(s)).    Imaging    No results found.      Signed by: Alpa Pedersen MD      Again, thank you for allowing me to participate in the care of your patient.        Sincerely,        Alpa Pedersen MD    "

## 2022-07-04 NOTE — PROGRESS NOTES
[facility] Hematology and Oncology Progress Note    Patient: Hilario Meeks  MRN: [id number]  Date of Service: Jun 30, 2022         Reason for Visit    Chief Complaint   Patient presents with     Oncology Clinic Visit     Chronic lymphocytic leukemia, MGUS (monoclonal gammopathy of unknown significance)       Assessment and Plan    Cancer Staging  No matching staging information was found for the patient.    ECOG Performance    0 - Independent     Pain  Pain Score: No Pain (0)      #.  IgG lambda MGUS    This is a short interval follow-up due to a significant rise of IgG and M spike from here prior.  He does not have any new clinical symptoms.  I reviewed his labs.  CBC remains within normal range.  Normal renal function and normal calcium.  No bone pain.  IgG level IgG level is 2500 and monoclonal protein is 1.5.  Kappa and lambda ratio remains stable at 0.05.     I recommended to follow-up labs and provider visit in 6 months and continue to assess clinical and laboratory indication for bone marrow biopsy and treatment.      #.  CLL, Trejo stage 2   Clinically stable without any signs and symptoms suggestive of CLL progression or B symptoms.  In peripheral blood, there is no signs of lymphocytosis at this point.  No indication for CLL treatment.    Continue labs and clinical exam in 6 months as above.    Encounter Diagnoses:    Problem List Items Addressed This Visit     Chronic lymphocytic leukemia (H)    Relevant Orders    CBC with Platelets & Differential    Comprehensive metabolic panel    Protein electrophoresis    Immunoglobulins A G and M    Protein Immunofixation Serum    Kappa and lambda light chain    MGUS (monoclonal gammopathy of unknown significance) - Primary    Relevant Orders    CBC with Platelets & Differential    Comprehensive metabolic panel    Protein electrophoresis    Immunoglobulins A G and M    Protein Immunofixation Serum    Kappa and lambda light chain             CC: Matthew HAYNES  "MD Ruchi   ______________________________________________________________________________  Diagnosis  4/2019-CLL, Trejo stage II by mild lymphocytosis, mild thrombocytopenia and splenomegaly.  Flow cytometry confirms CD5 positive lambda light chain restricted monoclonal B-cell population (39%)   -IgG lambda monoclonal gammopathy, M spike 1.4, IgG 2300, serum free light chain ratio of 0.04.    Treatment to date  Observation    History of Present Illness    Mr. Hilario Meeks presented today accompanied by his wife, Luz.  He is here to review the result.  He does not have any new health issue.  No frequent infection.  No palpable masses.  No pain in bones.     Review of systems  Apart from describing in HPI, the remainder of comprehensive ROS was negative.    Past History    Past Medical History:   Diagnosis Date     BPH (benign prostatic hyperplasia)        Past Surgical History:   Procedure Laterality Date     BONE MARROW BIOPSY, BONE SPECIMEN, NEEDLE/TROCAR  2019     LASER HOLMIUM ENUCLEATION PROSTATE N/A 6/27/2019    Procedure: Holmium Laser Enucleation of the Prostate;  Surgeon: Chester Turner MD;  [facility]: [facility]     TRANSRECTAL ULTRASONIC, TRANSURETHRAL RESECTION (TUR) OF PROSTATE CYST  2019    HOLEP, BPH, [facility]         Physical Exam    BP (!) 140/90   Pulse 55   Resp 16   Ht 1.892 m (6' 2.5\")   Wt 79.8 kg (176 lb)   SpO2 100%   BMI 22.29 kg/m      General: alert, awake, not in acute distress  HEENT: Head: Normal, normocephalic, atraumatic.  Eye: Normal external eye, conjunctiva, lids cornea, YARITZA.  Pharynx: Normal buccal mucosa. Normal pharynx.  Neck / Thyroid: Supple, no masses, nodes, nodules or enlargement.  Lymphatics: No abnormally enlarged lymph nodes.  Abdomen: abdomen is soft without significant tenderness, masses, organomegaly or guarding  Extremities: normal strength, tone, and muscle mass  Skin: normal. no rash or abnormalities  CNS: non focal.    Lab Results    No results found " for this or any previous visit (from the past 168 hour(s)).    Imaging    No results found.      Signed by: Alpa Pedersen MD

## 2022-09-18 ENCOUNTER — HEALTH MAINTENANCE LETTER (OUTPATIENT)
Age: 72
End: 2022-09-18

## 2022-12-23 ENCOUNTER — LAB (OUTPATIENT)
Dept: INFUSION THERAPY | Facility: CLINIC | Age: 72
End: 2022-12-23
Attending: INTERNAL MEDICINE
Payer: COMMERCIAL

## 2022-12-23 DIAGNOSIS — D47.2 MGUS (MONOCLONAL GAMMOPATHY OF UNKNOWN SIGNIFICANCE): ICD-10-CM

## 2022-12-23 DIAGNOSIS — C91.10 CHRONIC LYMPHOCYTIC LEUKEMIA (H): ICD-10-CM

## 2022-12-23 LAB
ALBUMIN SERPL-MCNC: 3.5 G/DL (ref 3.5–5)
ALP SERPL-CCNC: 65 U/L (ref 45–120)
ALT SERPL W P-5'-P-CCNC: 16 U/L (ref 0–45)
ANION GAP SERPL CALCULATED.3IONS-SCNC: 6 MMOL/L (ref 5–18)
AST SERPL W P-5'-P-CCNC: 20 U/L (ref 0–40)
BASOPHILS # BLD AUTO: 0.1 10E3/UL (ref 0–0.2)
BASOPHILS NFR BLD AUTO: 1 %
BILIRUB SERPL-MCNC: 0.8 MG/DL (ref 0–1)
BUN SERPL-MCNC: 14 MG/DL (ref 8–28)
CALCIUM SERPL-MCNC: 8.5 MG/DL (ref 8.5–10.5)
CHLORIDE BLD-SCNC: 97 MMOL/L (ref 98–107)
CO2 SERPL-SCNC: 29 MMOL/L (ref 22–31)
CREAT SERPL-MCNC: 0.85 MG/DL (ref 0.7–1.3)
EOSINOPHIL # BLD AUTO: 0.1 10E3/UL (ref 0–0.7)
EOSINOPHIL NFR BLD AUTO: 2 %
ERYTHROCYTE [DISTWIDTH] IN BLOOD BY AUTOMATED COUNT: 12.6 % (ref 10–15)
GFR SERPL CREATININE-BSD FRML MDRD: >90 ML/MIN/1.73M2
GLUCOSE BLD-MCNC: 73 MG/DL (ref 70–125)
HCT VFR BLD AUTO: 44.1 % (ref 40–53)
HGB BLD-MCNC: 15.1 G/DL (ref 13.3–17.7)
IGA SERPL-MCNC: 23 MG/DL (ref 84–499)
IGG SERPL-MCNC: 2542 MG/DL (ref 610–1616)
IGM SERPL-MCNC: 48 MG/DL (ref 35–242)
IMM GRANULOCYTES # BLD: 0 10E3/UL
IMM GRANULOCYTES NFR BLD: 0 %
KAPPA LC FREE SER-MCNC: 1.08 MG/DL (ref 0.33–1.94)
KAPPA LC FREE/LAMBDA FREE SER NEPH: 0.05 {RATIO} (ref 0.26–1.65)
LAMBDA LC FREE SERPL-MCNC: 19.92 MG/DL (ref 0.57–2.63)
LYMPHOCYTES # BLD AUTO: 1.8 10E3/UL (ref 0.8–5.3)
LYMPHOCYTES NFR BLD AUTO: 42 %
MCH RBC QN AUTO: 32.8 PG (ref 26.5–33)
MCHC RBC AUTO-ENTMCNC: 34.2 G/DL (ref 31.5–36.5)
MCV RBC AUTO: 96 FL (ref 78–100)
MONOCYTES # BLD AUTO: 0.2 10E3/UL (ref 0–1.3)
MONOCYTES NFR BLD AUTO: 6 %
NEUTROPHILS # BLD AUTO: 2.1 10E3/UL (ref 1.6–8.3)
NEUTROPHILS NFR BLD AUTO: 49 %
NRBC # BLD AUTO: 0 10E3/UL
NRBC BLD AUTO-RTO: 0 /100
PLATELET # BLD AUTO: 161 10E3/UL (ref 150–450)
POTASSIUM BLD-SCNC: 3.9 MMOL/L (ref 3.5–5)
PROT SERPL-MCNC: 7.7 G/DL (ref 6–8)
RBC # BLD AUTO: 4.61 10E6/UL (ref 4.4–5.9)
SODIUM SERPL-SCNC: 132 MMOL/L (ref 136–145)
TOTAL PROTEIN SERUM FOR ELP: 7.2 G/DL (ref 6.4–8.3)
WBC # BLD AUTO: 4.3 10E3/UL (ref 4–11)

## 2022-12-23 PROCEDURE — 84155 ASSAY OF PROTEIN SERUM: CPT | Mod: 91

## 2022-12-23 PROCEDURE — 86334 IMMUNOFIX E-PHORESIS SERUM: CPT | Performed by: PATHOLOGY

## 2022-12-23 PROCEDURE — 82784 ASSAY IGA/IGD/IGG/IGM EACH: CPT

## 2022-12-23 PROCEDURE — 80053 COMPREHEN METABOLIC PANEL: CPT

## 2022-12-23 PROCEDURE — 86334 IMMUNOFIX E-PHORESIS SERUM: CPT | Mod: 26

## 2022-12-23 PROCEDURE — 36415 COLL VENOUS BLD VENIPUNCTURE: CPT

## 2022-12-23 PROCEDURE — 84165 PROTEIN E-PHORESIS SERUM: CPT | Mod: TC | Performed by: PATHOLOGY

## 2022-12-23 PROCEDURE — 83521 IG LIGHT CHAINS FREE EACH: CPT | Mod: 59

## 2022-12-23 PROCEDURE — 84165 PROTEIN E-PHORESIS SERUM: CPT | Mod: 26

## 2022-12-23 PROCEDURE — 85025 COMPLETE CBC W/AUTO DIFF WBC: CPT

## 2022-12-23 PROCEDURE — 82040 ASSAY OF SERUM ALBUMIN: CPT

## 2022-12-26 LAB
ALBUMIN SERPL ELPH-MCNC: 3.8 G/DL (ref 3.7–5.1)
ALPHA1 GLOB SERPL ELPH-MCNC: 0.3 G/DL (ref 0.2–0.4)
ALPHA2 GLOB SERPL ELPH-MCNC: 0.4 G/DL (ref 0.5–0.9)
B-GLOBULIN SERPL ELPH-MCNC: 0.5 G/DL (ref 0.6–1)
GAMMA GLOB SERPL ELPH-MCNC: 2.2 G/DL (ref 0.7–1.6)
M PROTEIN SERPL ELPH-MCNC: 1.8 G/DL
PROT PATTERN SERPL ELPH-IMP: ABNORMAL
PROT PATTERN SERPL IFE-IMP: NORMAL

## 2022-12-30 ENCOUNTER — ONCOLOGY VISIT (OUTPATIENT)
Dept: ONCOLOGY | Facility: CLINIC | Age: 72
End: 2022-12-30
Attending: INTERNAL MEDICINE
Payer: COMMERCIAL

## 2022-12-30 VITALS
HEART RATE: 51 BPM | HEIGHT: 72 IN | SYSTOLIC BLOOD PRESSURE: 122 MMHG | DIASTOLIC BLOOD PRESSURE: 70 MMHG | OXYGEN SATURATION: 100 % | BODY MASS INDEX: 23.85 KG/M2 | WEIGHT: 176.1 LBS

## 2022-12-30 DIAGNOSIS — C91.10 CHRONIC LYMPHOCYTIC LEUKEMIA (H): ICD-10-CM

## 2022-12-30 DIAGNOSIS — R16.1 SPLENOMEGALY: ICD-10-CM

## 2022-12-30 DIAGNOSIS — D47.2 MGUS (MONOCLONAL GAMMOPATHY OF UNKNOWN SIGNIFICANCE): Primary | ICD-10-CM

## 2022-12-30 PROCEDURE — 99212 OFFICE O/P EST SF 10 MIN: CPT | Performed by: INTERNAL MEDICINE

## 2022-12-30 PROCEDURE — 99214 OFFICE O/P EST MOD 30 MIN: CPT | Performed by: INTERNAL MEDICINE

## 2022-12-30 PROCEDURE — G0463 HOSPITAL OUTPT CLINIC VISIT: HCPCS

## 2022-12-30 ASSESSMENT — PAIN SCALES - GENERAL: PAINLEVEL: NO PAIN (0)

## 2022-12-30 NOTE — PROGRESS NOTES
"Oncology Rooming Note    December 30, 2022 9:25 AM   Hilario Meeks is a 72 year old male who presents for:    Chief Complaint   Patient presents with     Oncology Clinic Visit     MGUS (monoclonal gammopathy of unknown significance)     Initial Vitals: BP (!) 170/105 (BP Location: Right arm, Patient Position: Sitting, Cuff Size: Adult Regular)   Pulse 51   Ht 1.829 m (6' 0.01\")   Wt 79.9 kg (176 lb 1.6 oz)   SpO2 100%   BMI 23.88 kg/m   Estimated body mass index is 23.88 kg/m  as calculated from the following:    Height as of this encounter: 1.829 m (6' 0.01\").    Weight as of this encounter: 79.9 kg (176 lb 1.6 oz). Body surface area is 2.01 meters squared.  No Pain (0) Comment: Data Unavailable   No LMP for male patient.  Allergies reviewed: Yes  Medications reviewed: Yes    Medications: Medication refills not needed today.  Pharmacy name entered into Relead: [id number] - [place], [place] - [address] [place] [address]    Clinical concerns: follow up with prior labs      Virginia Dickens MA            "

## 2022-12-30 NOTE — LETTER
"    12/30/2022         RE: Hilario Meeks  [address]  [place] [address]        Dear Colleague,    Thank you for referring your patient, Hilario Meeks, to the [facility] [place]. Please see a copy of my visit note below.    Oncology Rooming Note    December 30, 2022 9:25 AM   Hilario Meeks is a 72 year old male who presents for:    Chief Complaint   Patient presents with     Oncology Clinic Visit     MGUS (monoclonal gammopathy of unknown significance)     Initial Vitals: BP (!) 170/105 (BP Location: Right arm, Patient Position: Sitting, Cuff Size: Adult Regular)   Pulse 51   Ht 1.829 m (6' 0.01\")   Wt 79.9 kg (176 lb 1.6 oz)   SpO2 100%   BMI 23.88 kg/m   Estimated body mass index is 23.88 kg/m  as calculated from the following:    Height as of this encounter: 1.829 m (6' 0.01\").    Weight as of this encounter: 79.9 kg (176 lb 1.6 oz). Body surface area is 2.01 meters squared.  No Pain (0) Comment: Data Unavailable   No LMP for male patient.  Allergies reviewed: Yes  Medications reviewed: Yes    Medications: Medication refills not needed today.  Pharmacy name entered into Eventup: [id number] - [place], [place] - [address]    Clinical concerns: follow up with prior labs      Virginia Dickens MA              [facility] Hematology and Oncology Progress Note    Patient: Hilario Meeks  MRN: [id number]  Date of Service: Dec 30, 2022         Reason for Visit    Chief Complaint   Patient presents with     Oncology Clinic Visit     MGUS (monoclonal gammopathy of unknown significance)       Assessment and Plan     Cancer Staging   No matching staging information was found for the patient.    ECOG Performance    0 - Independent     Pain  Pain Score: No Pain (0)      #.  IgG lambda MGUS    He is clinically stable without signs or symptoms suggestive of plasma cell disease progression or myeloma defining illness.  CBC is entirely normal.  He has normal " liver function, kidney function and calcium.  Monoclonal peak is 1.8 <--1.5 <-- 1.8.  IgG level is stable around 2500.  Kappa/lambda ratio remains at 0.05.  Overall stable lab parameters.     I recommended continued surveillance at this point with follow-up labs and exam in 6 months.     #.  CLL, Trejo stage 2   Clinically stable without any signs and symptoms suggestive of CLL progression or B symptoms.  In peripheral blood, there is no signs of lymphocytosis at this point.  No indication for CLL treatment.    Continue labs and clinical exam in 6 months as above.    Encounter Diagnoses:    Problem List Items Addressed This Visit        Oncology Diagnoses    Chronic lymphocytic leukemia (H)    Relevant Orders    CBC with Platelets & Differential    Comprehensive metabolic panel    Protein electrophoresis    Immunoglobulins A G and M    Kappa and lambda light chain    MGUS (monoclonal gammopathy of unknown significance) - Primary    Relevant Orders    CBC with Platelets & Differential    Comprehensive metabolic panel    Protein electrophoresis    Immunoglobulins A G and M    Kappa and lambda light chain       Other    Splenomegaly          CC: Matthew Hopper MD   ______________________________________________________________________________  Diagnosis  4/2019- CLL, Trejo stage II by mild lymphocytosis, mild thrombocytopenia and splenomegaly.  Flow cytometry confirms CD5 positive lambda light chain restricted monoclonal B-cell population (39%)   -IgG lambda monoclonal gammopathy, M spike 1.4, IgG 2300, serum free light chain ratio of 0.04.    Treatment to date  Observation    History of Present Illness    Mr. Hilario Meeks presented today accompanied by his wife, Luz.  He is here to review the result.  He does not have any new health issue.  No frequent infection.  No palpable masses.  No pain in bones.  No fever.  No drenching sweats.  No unintentional weight loss.    Review of systems  Apart from describing in  "HPI, the remainder of comprehensive ROS was negative.    Past History    Past Medical History:   Diagnosis Date     BPH (benign prostatic hyperplasia)        Past Surgical History:   Procedure Laterality Date     BONE MARROW BIOPSY, BONE SPECIMEN, NEEDLE/TROCAR  2019     LASER HOLMIUM ENUCLEATION PROSTATE N/A 6/27/2019    Procedure: Holmium Laser Enucleation of the Prostate;  Surgeon: Chester Turner MD;  Location: [facility]     TRANSRECTAL ULTRASONIC, TRANSURETHRAL RESECTION (TUR) OF PROSTATE CYST  2019    HOLEP, BPH, [facility]         Physical Exam    /70   Pulse 51   Ht 1.829 m (6' 0.01\")   Wt 79.9 kg (176 lb 1.6 oz)   SpO2 100%   BMI 23.88 kg/m      General: alert, awake, not in acute distress  HEENT: Head: Normal, normocephalic, atraumatic.  Eye: Normal external eye, conjunctiva, lids cornea, YARITZA.  Pharynx: Normal buccal mucosa. Normal pharynx.  Neck / Thyroid: Supple, no masses, nodes, nodules or enlargement.  Lymphatics: No abnormally enlarged lymph nodes.  Abdomen: abdomen is soft without significant tenderness, masses, organomegaly or guarding  Extremities: normal strength, tone, and muscle mass  Skin: normal. no rash or abnormalities  CNS: non focal.    Lab Results    No results found for this or any previous visit (from the past 168 hour(s)).    Imaging    No results found.    30 minutes spent on the date of the encounter doing chart review, history and exam, documentation and further activities as noted above.    Signed by: Alpa Pedersen MD      Again, thank you for allowing me to participate in the care of your patient.        Sincerely,        Alpa Pedersen MD    "

## 2022-12-31 NOTE — PROGRESS NOTES
[facility] Hematology and Oncology Progress Note    Patient: Hilario Meeks  MRN: [id number]  Date of Service: Dec 30, 2022         Reason for Visit    Chief Complaint   Patient presents with     Oncology Clinic Visit     MGUS (monoclonal gammopathy of unknown significance)       Assessment and Plan     Cancer Staging   No matching staging information was found for the patient.    ECOG Performance    0 - Independent     Pain  Pain Score: No Pain (0)      #.  IgG lambda MGUS    He is clinically stable without signs or symptoms suggestive of plasma cell disease progression or myeloma defining illness.  CBC is entirely normal.  He has normal liver function, kidney function and calcium.  Monoclonal peak is 1.8 <--1.5 <-- 1.8.  IgG level is stable around 2500.  Kappa/lambda ratio remains at 0.05.  Overall stable lab parameters.     I recommended continued surveillance at this point with follow-up labs and exam in 6 months.     #.  CLL, Trejo stage 2   Clinically stable without any signs and symptoms suggestive of CLL progression or B symptoms.  In peripheral blood, there is no signs of lymphocytosis at this point.  No indication for CLL treatment.    Continue labs and clinical exam in 6 months as above.    Encounter Diagnoses:    Problem List Items Addressed This Visit        Oncology Diagnoses    Chronic lymphocytic leukemia (H)    Relevant Orders    CBC with Platelets & Differential    Comprehensive metabolic panel    Protein electrophoresis    Immunoglobulins A G and M    Kappa and lambda light chain    MGUS (monoclonal gammopathy of unknown significance) - Primary    Relevant Orders    CBC with Platelets & Differential    Comprehensive metabolic panel    Protein electrophoresis    Immunoglobulins A G and M    Kappa and lambda light chain       Other    Splenomegaly          CC: Matthew Hopper MD   ______________________________________________________________________________  Diagnosis  4/2019- CLL, Trejo  "stage II by mild lymphocytosis, mild thrombocytopenia and splenomegaly.  Flow cytometry confirms CD5 positive lambda light chain restricted monoclonal B-cell population (39%)   -IgG lambda monoclonal gammopathy, M spike 1.4, IgG 2300, serum free light chain ratio of 0.04.    Treatment to date  Observation    History of Present Illness    Mr. Hilario Meeks presented today accompanied by his wife, Luz.  He is here to review the result.  He does not have any new health issue.  No frequent infection.  No palpable masses.  No pain in bones.  No fever.  No drenching sweats.  No unintentional weight loss.    Review of systems  Apart from describing in HPI, the remainder of comprehensive ROS was negative.    Past History    Past Medical History:   Diagnosis Date     BPH (benign prostatic hyperplasia)        Past Surgical History:   Procedure Laterality Date     BONE MARROW BIOPSY, BONE SPECIMEN, NEEDLE/TROCAR  2019     LASER HOLMIUM ENUCLEATION PROSTATE N/A 6/27/2019    Procedure: Holmium Laser Enucleation of the Prostate;  Surgeon: Chester Turner MD;  [facility]: [facility]     TRANSRECTAL ULTRASONIC, TRANSURETHRAL RESECTION (TUR) OF PROSTATE CYST  2019    HOLEP, BPH, [facility]         Physical Exam    /70   Pulse 51   Ht 1.829 m (6' 0.01\")   Wt 79.9 kg (176 lb 1.6 oz)   SpO2 100%   BMI 23.88 kg/m      General: alert, awake, not in acute distress  HEENT: Head: Normal, normocephalic, atraumatic.  Eye: Normal external eye, conjunctiva, lids cornea, YARITZA.  Pharynx: Normal buccal mucosa. Normal pharynx.  Neck / Thyroid: Supple, no masses, nodes, nodules or enlargement.  Lymphatics: No abnormally enlarged lymph nodes.  Abdomen: abdomen is soft without significant tenderness, masses, organomegaly or guarding  Extremities: normal strength, tone, and muscle mass  Skin: normal. no rash or abnormalities  CNS: non focal.    Lab Results    No results found for this or any previous visit (from the past 168 " hour(s)).    Imaging    No results found.    30 minutes spent on the date of the encounter doing chart review, history and exam, documentation and further activities as noted above.    Signed by: Alpa Pedersen MD

## 2023-01-29 ENCOUNTER — HEALTH MAINTENANCE LETTER (OUTPATIENT)
Age: 73
End: 2023-01-29

## 2023-04-13 ENCOUNTER — OFFICE VISIT (OUTPATIENT)
Dept: INTERNAL MEDICINE | Facility: CLINIC | Age: 73
End: 2023-04-13
Payer: COMMERCIAL

## 2023-04-13 VITALS
WEIGHT: 171.3 LBS | SYSTOLIC BLOOD PRESSURE: 126 MMHG | BODY MASS INDEX: 21.98 KG/M2 | HEART RATE: 63 BPM | HEIGHT: 74 IN | RESPIRATION RATE: 12 BRPM | TEMPERATURE: 97.9 F | DIASTOLIC BLOOD PRESSURE: 86 MMHG | OXYGEN SATURATION: 100 %

## 2023-04-13 DIAGNOSIS — Z12.11 SCREEN FOR COLON CANCER: ICD-10-CM

## 2023-04-13 DIAGNOSIS — Z00.00 ANNUAL PHYSICAL EXAM: Primary | ICD-10-CM

## 2023-04-13 DIAGNOSIS — D47.2 MGUS (MONOCLONAL GAMMOPATHY OF UNKNOWN SIGNIFICANCE): ICD-10-CM

## 2023-04-13 DIAGNOSIS — Z13.220 SCREENING FOR HYPERLIPIDEMIA: ICD-10-CM

## 2023-04-13 DIAGNOSIS — Z12.5 SCREENING FOR PROSTATE CANCER: ICD-10-CM

## 2023-04-13 DIAGNOSIS — N40.0 BENIGN PROSTATIC HYPERPLASIA WITHOUT LOWER URINARY TRACT SYMPTOMS: ICD-10-CM

## 2023-04-13 DIAGNOSIS — Z86.0100 HISTORY OF COLONIC POLYPS: ICD-10-CM

## 2023-04-13 DIAGNOSIS — C91.10 CHRONIC LYMPHOCYTIC LEUKEMIA (H): ICD-10-CM

## 2023-04-13 DIAGNOSIS — R31.29 MICROSCOPIC HEMATURIA: ICD-10-CM

## 2023-04-13 PROBLEM — Z98.890 POST-OPERATIVE STATE: Status: RESOLVED | Noted: 2019-06-27 | Resolved: 2023-04-13

## 2023-04-13 LAB
ALBUMIN UR-MCNC: NEGATIVE MG/DL
APPEARANCE UR: CLEAR
BACTERIA #/AREA URNS HPF: ABNORMAL /HPF
BILIRUB UR QL STRIP: NEGATIVE
CHOLEST SERPL-MCNC: 185 MG/DL
COLOR UR AUTO: YELLOW
GLUCOSE UR STRIP-MCNC: NEGATIVE MG/DL
HDLC SERPL-MCNC: 72 MG/DL
HGB UR QL STRIP: ABNORMAL
KETONES UR STRIP-MCNC: NEGATIVE MG/DL
LDLC SERPL CALC-MCNC: 103 MG/DL
LEUKOCYTE ESTERASE UR QL STRIP: NEGATIVE
NITRATE UR QL: NEGATIVE
NONHDLC SERPL-MCNC: 113 MG/DL
PH UR STRIP: 7 [PH] (ref 5–8)
PSA SERPL DL<=0.01 NG/ML-MCNC: 0.22 NG/ML (ref 0–6.5)
RBC #/AREA URNS AUTO: ABNORMAL /HPF
SP GR UR STRIP: 1.01 (ref 1–1.03)
SQUAMOUS #/AREA URNS AUTO: ABNORMAL /LPF
TRIGL SERPL-MCNC: 51 MG/DL
TSH SERPL DL<=0.005 MIU/L-ACNC: 1.81 UIU/ML (ref 0.3–4.2)
UROBILINOGEN UR STRIP-ACNC: 0.2 E.U./DL
WBC #/AREA URNS AUTO: ABNORMAL /HPF

## 2023-04-13 PROCEDURE — 36415 COLL VENOUS BLD VENIPUNCTURE: CPT | Performed by: INTERNAL MEDICINE

## 2023-04-13 PROCEDURE — 84443 ASSAY THYROID STIM HORMONE: CPT | Performed by: INTERNAL MEDICINE

## 2023-04-13 PROCEDURE — 81001 URINALYSIS AUTO W/SCOPE: CPT | Performed by: INTERNAL MEDICINE

## 2023-04-13 PROCEDURE — 99213 OFFICE O/P EST LOW 20 MIN: CPT | Mod: 25 | Performed by: INTERNAL MEDICINE

## 2023-04-13 PROCEDURE — 99397 PER PM REEVAL EST PAT 65+ YR: CPT | Performed by: INTERNAL MEDICINE

## 2023-04-13 PROCEDURE — G0103 PSA SCREENING: HCPCS | Performed by: INTERNAL MEDICINE

## 2023-04-13 PROCEDURE — 80061 LIPID PANEL: CPT | Performed by: INTERNAL MEDICINE

## 2023-04-13 ASSESSMENT — ENCOUNTER SYMPTOMS
ABDOMINAL PAIN: 0
FEVER: 0
DYSURIA: 0
FREQUENCY: 0
HEADACHES: 0
SHORTNESS OF BREATH: 0
HEMATURIA: 0
PARESTHESIAS: 0
COUGH: 0
CHILLS: 0
MYALGIAS: 0
DIZZINESS: 0
NAUSEA: 0
HEARTBURN: 0
HEMATOCHEZIA: 0
EYE PAIN: 0
ARTHRALGIAS: 1
NERVOUS/ANXIOUS: 0
JOINT SWELLING: 0
WEAKNESS: 0
CONSTIPATION: 1
PALPITATIONS: 0
SORE THROAT: 0
DIARRHEA: 0

## 2023-04-13 ASSESSMENT — ACTIVITIES OF DAILY LIVING (ADL): CURRENT_FUNCTION: NO ASSISTANCE NEEDED

## 2023-04-13 NOTE — PROGRESS NOTES
"SUBJECTIVE:   Hilario is a 72 year old who presents for Preventive Visit.       View : No data to display.            Patient has been advised of split billing requirements and indicates understanding: Yes  Are you in the first 12 months of your Medicare coverage?  No    Healthy Habits:     In general, how would you rate your overall health?  Excellent    Frequency of exercise:  6-7 days/week    Duration of exercise:  30-45 minutes    Do you usually eat at least 4 servings of fruit and vegetables a day, include whole grains    & fiber and avoid regularly eating high fat or \"junk\" foods?  Yes    Taking medications regularly:  Not Applicable    Medication side effects:  Not applicable    Ability to successfully perform activities of daily living:  No assistance needed    Home Safety:  No safety concerns identified    Hearing Impairment:  No hearing concerns    In the past 6 months, have you been bothered by leaking of urine?  No    In general, how would you rate your overall mental or emotional health?  Good      PHQ-2 Total Score: 0    Additional concerns today:  No      Have you ever done Advance Care Planning? (For example, a Health Directive, POLST, or a discussion with a medical provider or your loved ones about your wishes): Yes, advance care planning is on file.       Fall risk  Fallen 2 or more times in the past year?: No  Any fall with injury in the past year?: No    Cognitive Screening   1) Repeat 3 items (Leader, Season, Table)    2) Clock draw: NORMAL  3) 3 item recall: Recalls 3 objects  Results: 3 items recalled: COGNITIVE IMPAIRMENT LESS LIKELY    Mini-CogTM Copyright RUEL Hammond. Licensed by the author for use in [facility]; reprinted with permission (stefanie@.[facility]). All rights reserved.      Do you have sleep apnea, excessive snoring or daytime drowsiness?: no    Reviewed and updated as needed this visit by clinical staff                  Reviewed and updated as needed this visit by " "Provider                 Social History     Tobacco Use     Smoking status: Never     Smokeless tobacco: Never   Vaping Use     Vaping status: Not on file   Substance Use Topics     Alcohol use: Yes     Alcohol/week: 10.0 standard drinks of alcohol     Types: 1 Glasses of wine per week     Comment: Alcoholic Drinks/day: \"14 lunch & dinner\"           4/13/2023     8:08 AM   Alcohol Use   Prescreen: >3 drinks/day or >7 drinks/week? No     Do you have a current opioid prescription? No  Do you use any other controlled substances or medications that are not prescribed by a provider? None  Current providers sharing in care for this patient include:   Patient Care Team:  Matthew Hopper MD as PCP - General  Matthew Hopper MD Thaw, Sunn Sunn, MD as MD (Hematology & Oncology)  Matthew Hopper MD as Assigned PCP  Alpa Pedersen MD as Assigned Cancer Care Provider  Marge Martinez RN as Specialty Care Coordinator (Hematology & Oncology)    The following health maintenance items are reviewed in Epic and correct as of today:  Health Maintenance   Topic Date Due     ANNUAL REVIEW OF HM ORDERS  Never done     MEDICARE ANNUAL WELLNESS VISIT  07/21/2021     COLORECTAL CANCER SCREENING  04/16/2022     FALL RISK ASSESSMENT  04/13/2024     LIPID  07/21/2025     ADVANCE CARE PLANNING  07/21/2025     DTAP/TDAP/TD IMMUNIZATION (3 - Td or Tdap) 07/21/2030     HEPATITIS C SCREENING  Completed     PHQ-2 (once per calendar year)  Completed     INFLUENZA VACCINE  Completed     Pneumococcal Vaccine: 65+ Years  Completed     ZOSTER IMMUNIZATION  Completed     AORTIC ANEURYSM SCREENING (SYSTEM ASSIGNED)  Completed     COVID-19 Vaccine  Completed     IPV IMMUNIZATION  Aged Out     MENINGITIS IMMUNIZATION  Aged Out       Review of Systems   Constitutional: Negative for chills and fever.   HENT: Positive for hearing loss. Negative for congestion, ear pain and sore throat.    Eyes: Positive for visual disturbance. Negative for pain. " "  Respiratory: Negative for cough and shortness of breath.    Cardiovascular: Negative for chest pain, palpitations and peripheral edema.   Gastrointestinal: Positive for constipation. Negative for abdominal pain, diarrhea, heartburn, hematochezia and nausea.   Genitourinary: Negative for dysuria, frequency, genital sores, hematuria, impotence, penile discharge and urgency.   Musculoskeletal: Positive for arthralgias. Negative for joint swelling and myalgias.   Skin: Positive for rash.   Neurological: Negative for dizziness, weakness, headaches and paresthesias.   Psychiatric/Behavioral: Negative for mood changes. The patient is not nervous/anxious.        OBJECTIVE:   There were no vitals taken for this visit. Estimated body mass index is 23.88 kg/m  as calculated from the following:    Height as of 12/30/22: 1.829 m (6' 0.01\").    Weight as of 12/30/22: 79.9 kg (176 lb 1.6 oz).  Physical Exam  EYES: Eyelids, conjunctiva, and sclera were normal. Pupils were normal. Cornea, iris, and lens were normal bilaterally.  HEAD, EARS, NOSE, MOUTH, AND THROAT: Head and face were normal. Hearing was normal to voice and the ears were normal to external exam. Nose appearance was normal and there was no discharge. Oropharynx was normal.  NECK: Neck appearance was normal. There were no neck masses and the thyroid was not enlarged.  RESPIRATORY: Breathing pattern was normal and the chest moved symmetrically.  Percussion/auscultatory percussion was normal.  Lung sounds were normal and there were no abnormal sounds.  CARDIOVASCULAR: Heart rate and rhythm were normal.  S1 and S2 were normal and there were no extra sounds or murmurs. Peripheral pulses in arms and legs were normal.  Jugular venous pressure was normal.  There was no peripheral edema.  GASTROINTESTINAL: The abdomen was normal in contour.  Bowel sounds were present.  Percussion detected no organ enlargement or tenderness.  Palpation detected no tenderness, mass, or " enlarged organs.   MUSCULOSKELETAL: Skeletal configuration was normal and muscle mass was normal for age. Joint appearance was overall normal.  LYMPHATIC: There were no enlarged nodes.  SKIN/HAIR/NAILS: Skin color was normal.  There were no skin lesions.  Hair and nails were normal.  NEUROLOGIC: The patient was alert and oriented to person, place, time, and circumstance. Speech was normal. Cranial nerves were normal. Motor strength was normal for age. The patient was normally coordinated.  PSYCHIATRIC:  Mood and affect were normal and the patient had normal recent and remote memory. The patient's judgment and insight were normal.      ASSESSMENT / PLAN:   1. Annual physical exam  This is a 72-year-old man who comes in for annual wellness visit.  Is been about 3 years since have seen him.  Overall feeling well    2. Screen for colon cancer  3. History of colonic polyps  We will schedule his colonoscopy and has the paperwork at home    4. Screening for hyperlipidemia  - Lipid panel reflex to direct LDL Fasting; Future  - Lipid panel reflex to direct LDL Fasting    5. Screening for prostate cancer  - Prostate Specific Antigen Screen; Future  - Prostate Specific Antigen Screen    6. Chronic lymphocytic leukemia (H)  7. MGUS (monoclonal gammopathy of unknown significance)  Management of these per oncology, surveillance at this time    8. Benign prostatic hyperplasia without lower urinary tract symptoms  Status post surgical/procedural debulking, doing well  - TSH with free T4 reflex; Future  - UA Macroscopic with reflex to Microscopic and Culture; Future  - TSH with free T4 reflex  - UA Macroscopic with reflex to Microscopic and Culture  - UA Microscopic with Reflex to Culture      Patient has been advised of split billing requirements and indicates understanding: Yes      COUNSELING:  Reviewed preventive health counseling, as reflected in patient instructions        He reports that he has never smoked. He has never used  smokeless tobacco.      Appropriate preventive services were discussed with this patient, including applicable screening as appropriate for cardiovascular disease, diabetes, osteopenia/osteoporosis, and glaucoma.  As appropriate for age/gender, discussed screening for colorectal cancer, prostate cancer, breast cancer, and cervical cancer. Checklist reviewing preventive services available has been given to the patient.    Reviewed patients plan of care and provided an AVS. The Basic Care Plan (routine screening as documented in Health Maintenance) for Hilario meets the Care Plan requirement. This Care Plan has been established and reviewed with the Patient.    Matthew Hopper MD  [facility]    Identified Health Risks:    I have reviewed Opioid Use Disorder and Substance Use Disorder risk factors and made any needed referrals.

## 2023-04-20 ENCOUNTER — MYC MEDICAL ADVICE (OUTPATIENT)
Dept: INTERNAL MEDICINE | Facility: CLINIC | Age: 73
End: 2023-04-20
Payer: COMMERCIAL

## 2023-04-20 DIAGNOSIS — R31.29 MICROSCOPIC HEMATURIA: Primary | ICD-10-CM

## 2023-06-22 ENCOUNTER — LAB (OUTPATIENT)
Dept: INFUSION THERAPY | Facility: CLINIC | Age: 73
End: 2023-06-22
Attending: INTERNAL MEDICINE
Payer: COMMERCIAL

## 2023-06-22 DIAGNOSIS — C91.10 CHRONIC LYMPHOCYTIC LEUKEMIA (H): ICD-10-CM

## 2023-06-22 DIAGNOSIS — D47.2 MGUS (MONOCLONAL GAMMOPATHY OF UNKNOWN SIGNIFICANCE): ICD-10-CM

## 2023-06-22 LAB
ALBUMIN SERPL-MCNC: 3.4 G/DL (ref 3.5–5)
ALP SERPL-CCNC: 56 U/L (ref 45–120)
ALT SERPL W P-5'-P-CCNC: 15 U/L (ref 0–45)
ANION GAP SERPL CALCULATED.3IONS-SCNC: 4 MMOL/L (ref 5–18)
AST SERPL W P-5'-P-CCNC: 22 U/L (ref 0–40)
BASOPHILS # BLD AUTO: 0.1 10E3/UL (ref 0–0.2)
BASOPHILS NFR BLD AUTO: 1 %
BILIRUB SERPL-MCNC: 1 MG/DL (ref 0–1)
BUN SERPL-MCNC: 11 MG/DL (ref 8–28)
CALCIUM SERPL-MCNC: 8.3 MG/DL (ref 8.5–10.5)
CHLORIDE BLD-SCNC: 99 MMOL/L (ref 98–107)
CO2 SERPL-SCNC: 30 MMOL/L (ref 22–31)
CREAT SERPL-MCNC: 0.84 MG/DL (ref 0.7–1.3)
EOSINOPHIL # BLD AUTO: 0.1 10E3/UL (ref 0–0.7)
EOSINOPHIL NFR BLD AUTO: 2 %
ERYTHROCYTE [DISTWIDTH] IN BLOOD BY AUTOMATED COUNT: 12.9 % (ref 10–15)
GFR SERPL CREATININE-BSD FRML MDRD: >90 ML/MIN/1.73M2
GLUCOSE BLD-MCNC: 69 MG/DL (ref 70–125)
HCT VFR BLD AUTO: 41.3 % (ref 40–53)
HGB BLD-MCNC: 14 G/DL (ref 13.3–17.7)
IMM GRANULOCYTES # BLD: 0 10E3/UL
IMM GRANULOCYTES NFR BLD: 0 %
LYMPHOCYTES # BLD AUTO: 1.8 10E3/UL (ref 0.8–5.3)
LYMPHOCYTES NFR BLD AUTO: 41 %
MCH RBC QN AUTO: 32.6 PG (ref 26.5–33)
MCHC RBC AUTO-ENTMCNC: 33.9 G/DL (ref 31.5–36.5)
MCV RBC AUTO: 96 FL (ref 78–100)
MONOCYTES # BLD AUTO: 0.3 10E3/UL (ref 0–1.3)
MONOCYTES NFR BLD AUTO: 8 %
NEUTROPHILS # BLD AUTO: 2.1 10E3/UL (ref 1.6–8.3)
NEUTROPHILS NFR BLD AUTO: 48 %
NRBC # BLD AUTO: 0 10E3/UL
NRBC BLD AUTO-RTO: 0 /100
PLATELET # BLD AUTO: 159 10E3/UL (ref 150–450)
POTASSIUM BLD-SCNC: 4.2 MMOL/L (ref 3.5–5)
PROT SERPL-MCNC: 6.9 G/DL (ref 6–8)
RBC # BLD AUTO: 4.3 10E6/UL (ref 4.4–5.9)
SODIUM SERPL-SCNC: 133 MMOL/L (ref 136–145)
TOTAL PROTEIN SERUM FOR ELP: 6.8 G/DL (ref 6.4–8.3)
WBC # BLD AUTO: 4.4 10E3/UL (ref 4–11)

## 2023-06-22 PROCEDURE — 82784 ASSAY IGA/IGD/IGG/IGM EACH: CPT

## 2023-06-22 PROCEDURE — 36415 COLL VENOUS BLD VENIPUNCTURE: CPT

## 2023-06-22 PROCEDURE — 84155 ASSAY OF PROTEIN SERUM: CPT

## 2023-06-22 PROCEDURE — 83521 IG LIGHT CHAINS FREE EACH: CPT | Mod: 59

## 2023-06-22 PROCEDURE — 84165 PROTEIN E-PHORESIS SERUM: CPT | Mod: TC | Performed by: PATHOLOGY

## 2023-06-22 PROCEDURE — 84165 PROTEIN E-PHORESIS SERUM: CPT | Mod: 26

## 2023-06-22 PROCEDURE — 85025 COMPLETE CBC W/AUTO DIFF WBC: CPT

## 2023-06-22 PROCEDURE — 80053 COMPREHEN METABOLIC PANEL: CPT

## 2023-06-23 LAB
IGA SERPL-MCNC: 15 MG/DL (ref 84–499)
IGG SERPL-MCNC: 2122 MG/DL (ref 610–1616)
IGM SERPL-MCNC: 52 MG/DL (ref 35–242)
KAPPA LC FREE SER-MCNC: 1.16 MG/DL (ref 0.33–1.94)
KAPPA LC FREE/LAMBDA FREE SER NEPH: 0.06 {RATIO} (ref 0.26–1.65)
LAMBDA LC FREE SERPL-MCNC: 18.61 MG/DL (ref 0.57–2.63)

## 2023-06-26 LAB
ALBUMIN SERPL ELPH-MCNC: 3.7 G/DL (ref 3.7–5.1)
ALPHA1 GLOB SERPL ELPH-MCNC: 0.2 G/DL (ref 0.2–0.4)
ALPHA2 GLOB SERPL ELPH-MCNC: 0.4 G/DL (ref 0.5–0.9)
B-GLOBULIN SERPL ELPH-MCNC: 0.5 G/DL (ref 0.6–1)
GAMMA GLOB SERPL ELPH-MCNC: 2 G/DL (ref 0.7–1.6)
M PROTEIN SERPL ELPH-MCNC: 1.4 G/DL
PROT PATTERN SERPL ELPH-IMP: ABNORMAL

## 2023-06-29 ENCOUNTER — ONCOLOGY VISIT (OUTPATIENT)
Dept: ONCOLOGY | Facility: CLINIC | Age: 73
End: 2023-06-29
Attending: INTERNAL MEDICINE
Payer: COMMERCIAL

## 2023-06-29 VITALS
TEMPERATURE: 98.4 F | SYSTOLIC BLOOD PRESSURE: 140 MMHG | DIASTOLIC BLOOD PRESSURE: 98 MMHG | RESPIRATION RATE: 16 BRPM | OXYGEN SATURATION: 99 % | BODY MASS INDEX: 22.25 KG/M2 | WEIGHT: 171 LBS | HEART RATE: 54 BPM

## 2023-06-29 DIAGNOSIS — D47.2 MGUS (MONOCLONAL GAMMOPATHY OF UNKNOWN SIGNIFICANCE): Primary | ICD-10-CM

## 2023-06-29 DIAGNOSIS — C91.10 CHRONIC LYMPHOCYTIC LEUKEMIA (H): ICD-10-CM

## 2023-06-29 PROCEDURE — G0463 HOSPITAL OUTPT CLINIC VISIT: HCPCS | Performed by: INTERNAL MEDICINE

## 2023-06-29 PROCEDURE — 99213 OFFICE O/P EST LOW 20 MIN: CPT | Performed by: INTERNAL MEDICINE

## 2023-06-29 PROCEDURE — 99214 OFFICE O/P EST MOD 30 MIN: CPT | Performed by: INTERNAL MEDICINE

## 2023-06-29 ASSESSMENT — PAIN SCALES - GENERAL: PAINLEVEL: NO PAIN (0)

## 2023-06-29 NOTE — PROGRESS NOTES
[facility] Hematology and Oncology Progress Note    Patient: Hilario Meeks  MRN: [id number]  Date of Service: Jun 29, 2023         Reason for Visit    Chief Complaint   Patient presents with     Oncology Clinic Visit     Chronic lymphocytic leukemia (H)       Assessment and Plan     Cancer Staging   No matching staging information was found for the patient.    ECOG Performance    0 - Independent     Pain  Pain Score: No Pain (0)      #.  IgG lambda MGUS    He is clinically stable without signs or symptoms suggestive of plasma cell disease progression or myeloma defining illness.  CBC is entirely normal.  He has normal liver function, kidney function and calcium.  Monoclonal peak is 1.4 <--1.8 <--1.5 <-- 1.8.  IgG, lambda and Kappa/lambda ratio remains stable.     I recommended continued surveillance at this point with follow-up labs and exam in 1 year.   He is advised to call me sooner with any concerns.    #.  CLL, Trejo stage 2   Clinically stable without any signs and symptoms suggestive of CLL progression or B symptoms.  In peripheral blood, there is no signs of lymphocytosis at this point.  No indication for CLL treatment.    Continue labs and clinical exam in 1 year as above.    Encounter Diagnoses:    Problem List Items Addressed This Visit        Oncology Diagnoses    Chronic lymphocytic leukemia (H)    MGUS (monoclonal gammopathy of unknown significance) - Primary          CC: Matthew Hopper MD   ______________________________________________________________________________  Diagnosis  4/2019- CLL, Trejo stage II by mild lymphocytosis, mild thrombocytopenia and splenomegaly.  Flow cytometry confirms CD5 positive lambda light chain restricted monoclonal B-cell population (39%)     -IgG lambda monoclonal gammopathy, M spike 1.4, IgG 2300, serum free light chain ratio of 0.04.    Treatment to date  Observation    History of Present Illness    Mr. Hilario Meeks presented today accompanied by his  wife, Luz.  He is doing very well.  No new concerns today.  He does not have any fever, drenching sweats, unintentional weight loss, bone pain, frequent infections, neuropathy symptoms.    Review of systems  Apart from describing in HPI, the remainder of comprehensive ROS was negative.    Past History    Past Medical History:   Diagnosis Date     BPH (benign prostatic hyperplasia)        Past Surgical History:   Procedure Laterality Date     BONE MARROW BIOPSY, BONE SPECIMEN, NEEDLE/TROCAR  2019     LASER HOLMIUM ENUCLEATION PROSTATE N/A 6/27/2019    Procedure: Holmium Laser Enucleation of the Prostate;  Surgeon: Chester Turner MD;  Location: [facility] OR     TRANSRECTAL ULTRASONIC, TRANSURETHRAL RESECTION (TUR) OF PROSTATE CYST  2019    HOLEP, BPH, [facility]         Physical Exam    BP (!) 140/98 (Patient Position: Sitting)   Pulse 54   Temp 98.4  F (36.9  C)   Resp 16   Wt 77.6 kg (171 lb)   SpO2 99%   BMI 22.25 kg/m      General: alert, awake, not in acute distress  HEENT: Head: Normal, normocephalic, atraumatic.  Eye: Normal external eye, conjunctiva, lids cornea, YARITZA.  Pharynx: Normal buccal mucosa. Normal pharynx.  Neck / Thyroid: Supple, no masses, nodes, nodules or enlargement.  Lymphatics: No abnormally enlarged lymph nodes.  Abdomen: abdomen is soft without significant tenderness, masses, organomegaly or guarding  Extremities: normal strength, tone, and muscle mass  Skin: normal. no rash or abnormalities  CNS: non focal.    Lab Results    No results found for this or any previous visit (from the past 168 hour(s)).    Imaging    No results found.    30 minutes spent on the date of the encounter doing chart review, history and exam, documentation, communication of the treatment plan with the care team and further activities as noted above.    Signed by: Alpa Pedersen MD

## 2023-06-29 NOTE — LETTER
"    6/29/2023         RE: Hilario Meeks  [address]  [place] [address]        Dear Colleague,    Thank you for referring your patient, Hilario Meeks, to the [facility] [place]. Please see a copy of my visit note below.    Oncology Rooming Note    June 29, 2023 2:58 PM   Hilario Meeks is a 73 year old male who presents for:    Chief Complaint   Patient presents with     Oncology Clinic Visit     Chronic lymphocytic leukemia (H)     Initial Vitals: BP (!) 140/98 (Patient Position: Sitting)   Pulse 54   Temp 98.4  F (36.9  C)   Resp 16   Wt 77.6 kg (171 lb)   SpO2 99%   BMI 22.25 kg/m   Estimated body mass index is 22.25 kg/m  as calculated from the following:    Height as of 4/13/23: 1.867 m (6' 1.5\").    Weight as of this encounter: 77.6 kg (171 lb). Body surface area is 2.01 meters squared.  No Pain (0) Comment: Data Unavailable   No LMP for male patient.  Allergies reviewed: Yes  Medications reviewed: Yes    Medications: Medication refills not needed today.  Pharmacy name entered into [employer]: [id number] - [place], [place] - [address] [place] [address]    Clinical concerns:       July Biggs [facility] Hematology and Oncology Progress Note    Patient: Hilario Meeks  MRN: [id number]  Date of Service: Jun 29, 2023         Reason for Visit    Chief Complaint   Patient presents with     Oncology Clinic Visit     Chronic lymphocytic leukemia (H)       Assessment and Plan     Cancer Staging   No matching staging information was found for the patient.    ECOG Performance    0 - Independent     Pain  Pain Score: No Pain (0)      #.  IgG lambda MGUS    He is clinically stable without signs or symptoms suggestive of plasma cell disease progression or myeloma defining illness.  CBC is entirely normal.  He has normal liver function, kidney function and calcium.  Monoclonal peak is 1.4 <--1.8 <--1.5 <-- 1.8.  IgG, lambda and Kappa/lambda ratio " remains stable.     I recommended continued surveillance at this point with follow-up labs and exam in 1 year.   He is advised to call me sooner with any concerns.    #.  CLL, Trejo stage 2   Clinically stable without any signs and symptoms suggestive of CLL progression or B symptoms.  In peripheral blood, there is no signs of lymphocytosis at this point.  No indication for CLL treatment.    Continue labs and clinical exam in 1 year as above.    Encounter Diagnoses:    Problem List Items Addressed This Visit        Oncology Diagnoses    Chronic lymphocytic leukemia (H)    MGUS (monoclonal gammopathy of unknown significance) - Primary          CC: Matthew Hopper MD   ______________________________________________________________________________  Diagnosis  4/2019- CLL, Trejo stage II by mild lymphocytosis, mild thrombocytopenia and splenomegaly.  Flow cytometry confirms CD5 positive lambda light chain restricted monoclonal B-cell population (39%)     -IgG lambda monoclonal gammopathy, M spike 1.4, IgG 2300, serum free light chain ratio of 0.04.    Treatment to date  Observation    History of Present Illness    Mr. Hilario Meeks presented today accompanied by his wife, Luz.  He is doing very well.  No new concerns today.  He does not have any fever, drenching sweats, unintentional weight loss, bone pain, frequent infections, neuropathy symptoms.    Review of systems  Apart from describing in HPI, the remainder of comprehensive ROS was negative.    Past History    Past Medical History:   Diagnosis Date     BPH (benign prostatic hyperplasia)        Past Surgical History:   Procedure Laterality Date     BONE MARROW BIOPSY, BONE SPECIMEN, NEEDLE/TROCAR  2019     LASER HOLMIUM ENUCLEATION PROSTATE N/A 6/27/2019    Procedure: Holmium Laser Enucleation of the Prostate;  Surgeon: Chester Turner MD;  Location: [facility]     TRANSRECTAL ULTRASONIC, TRANSURETHRAL RESECTION (TUR) OF PROSTATE CYST  2019    HOLEP, BPH, [facility]         Physical Exam    BP (!) 140/98 (Patient Position: Sitting)   Pulse 54   Temp 98.4  F (36.9  C)   Resp 16   Wt 77.6 kg (171 lb)   SpO2 99%   BMI 22.25 kg/m      General: alert, awake, not in acute distress  HEENT: Head: Normal, normocephalic, atraumatic.  Eye: Normal external eye, conjunctiva, lids cornea, YARITZA.  Pharynx: Normal buccal mucosa. Normal pharynx.  Neck / Thyroid: Supple, no masses, nodes, nodules or enlargement.  Lymphatics: No abnormally enlarged lymph nodes.  Abdomen: abdomen is soft without significant tenderness, masses, organomegaly or guarding  Extremities: normal strength, tone, and muscle mass  Skin: normal. no rash or abnormalities  CNS: non focal.    Lab Results    No results found for this or any previous visit (from the past 168 hour(s)).    Imaging    No results found.    30 minutes spent on the date of the encounter doing chart review, history and exam, documentation, communication of the treatment plan with the care team and further activities as noted above.    Signed by: Alpa Pedersen MD      Again, thank you for allowing me to participate in the care of your patient.        Sincerely,        Alpa Pedersen MD

## 2023-06-29 NOTE — PROGRESS NOTES
"Oncology Rooming Note    June 29, 2023 2:58 PM   Hilario Meeks is a 73 year old male who presents for:    Chief Complaint   Patient presents with     Oncology Clinic Visit     Chronic lymphocytic leukemia (H)     Initial Vitals: BP (!) 140/98 (Patient Position: Sitting)   Pulse 54   Temp 98.4  F (36.9  C)   Resp 16   Wt 77.6 kg (171 lb)   SpO2 99%   BMI 22.25 kg/m   Estimated body mass index is 22.25 kg/m  as calculated from the following:    Height as of 4/13/23: 1.867 m (6' 1.5\").    Weight as of this encounter: 77.6 kg (171 lb). Body surface area is 2.01 meters squared.  No Pain (0) Comment: Data Unavailable   No LMP for male patient.  Allergies reviewed: Yes  Medications reviewed: Yes    Medications: Medication refills not needed today.  Pharmacy name entered into [employer]: [id number] - [place], [place] - [address] [place] [address]    Clinical concerns:       July Biggs CMA            "

## 2023-10-03 NOTE — H&P
Pre-Operative H & P     CC:  Preoperative exam to assess for increased cardiopulmonary risk while undergoing surgery and anesthesia.    Date of Encounter: 6/19/2019  Primary Care Physician:  No primary care provider on file.  Reason for Visit: Urinary retention [R33.9]  - Primary    HPI  Hilario Meeks is a 68 y/o male who presents for pre-operative H&P in preparation for Holmium Laser Enucleation of the Prostate with Chester Turner MD on 6/27/19 at  [facility] for treatment of BPH with urinary retention.    Mr. Meeks was noted to have urinary retention during the summer of 2018 and was found to have a distended bladder. About 3 months ago, [facility] placed a catheter. Flomax was started and increased to twice daily but he no longer taking this. He has failed a voiding trial x 2. PSA was 3.9 in 04/2019. He now presents for the above procedure.    PMH is significant for CLL that is currently under surveillance and has not been treated. He is very physically active and bicycles daily.    History was obtained from patient & chart review.    Past Medical History  Past Medical History:   Diagnosis Date     BPH (benign prostatic hyperplasia)        Past Surgical History  History reviewed. No pertinent surgical history.    Hx of Blood transfusions/reactions: no     Hx of abnormal bleeding or anti-platelet use: no    Menstrual history: No LMP for male patient.: N/A    Steroid use in the last year: no    Personal or FH with difficulty with Anesthesia:  no    Prior to Admission Medications  Current Outpatient Medications   Medication Sig Dispense Refill     ibuprofen (ADVIL/MOTRIN) 100 MG tablet Take 100 mg by mouth every 4 hours as needed         Allergies  No Known Allergies    Social History  Social History     Socioeconomic History     Marital status:      Spouse name: Not on file     Number of children: Not on file     Years of education: Not on file      Siliq Counseling:  I discussed with the patient the risks of Siliq including but not limited to new or worsening depression, suicidal thoughts and behavior, immunosuppression, malignancy, posterior leukoencephalopathy syndrome, and serious infections.  The patient understands that monitoring is required including a PPD at baseline and must alert us or the primary physician if symptoms of infection or other concerning signs are noted. There is also a special program designed to monitor depression which is required with Siliq. Highest education level: Not on file   Occupational History     Not on file   Social Needs     Financial resource strain: Not on file     Food insecurity:     Worry: Not on file     Inability: Not on file     Transportation needs:     Medical: Not on file     Non-medical: Not on file   Tobacco Use     Smoking status: Never Smoker     Smokeless tobacco: Never Used   Substance and Sexual Activity     Alcohol use: Yes     Alcohol/week: 0.6 oz     Types: 1 Glasses of wine per day     Frequency: 4 or more times a week     Drinks per session: 1 or 2     Drug use: Never     Sexual activity: Not on file   Lifestyle     Physical activity:     Days per week: Not on file     Minutes per session: Not on file     Stress: Not on file   Relationships     Social connections:     Talks on phone: Not on file     Gets together: Not on file     Attends Anglican service: Not on file     Active member of club or organization: Not on file     Attends meetings of clubs or organizations: Not on file     Relationship status: Not on file     Intimate partner violence:     Fear of current or ex partner: Not on file     Emotionally abused: Not on file     Physically abused: Not on file     Forced sexual activity: Not on file   Other Topics Concern     Not on file   Social History Narrative     Not on file       Family History  Family History   Problem Relation Age of Onset     Hypertension Mother      Abdominal Aortic Aneurysm Father 74        ruptured aneurysm     Hypercalcemia Brother      Hypertension Brother      Anesthesia Reaction No family hx of      Deep Vein Thrombosis (DVT) No family hx of        ROS/MED HX  The complete review of systems is negative other than noted in the HPI or here.  Patient denies recent illness, fever and respiratory infection during past month.  Pt denies steroid use during past year.    ENT/Pulmonary:  - neg pulmonary ROS     Neurologic:  - neg neurologic ROS     Cardiovascular:  - neg cardiovascular ROS  Hydroquinone Pregnancy And Lactation Text: This medication has not been assigned a Pregnancy Risk Category but animal studies failed to show danger with the topical medication. It is unknown if the medication is excreted in breast milk. "  (+) ----. : . . . :. . No previous cardiac testing       METS/Exercise Tolerance: Comment: Rides bicycle daily >4 METS   Hematologic:  - neg hematologic  ROS       Musculoskeletal:         GI/Hepatic:  - neg GI/hepatic ROS       Renal/Genitourinary: Comment: Urinary retention, has had catheter during past 10 weeks    (+) BPH,       Endo:  - neg endo ROS       Psychiatric:  - neg psychiatric ROS       Infectious Disease:  - neg infectious disease ROS       Malignancy:   (+) Malignancy History of Lymphoma/Leukemia  Lymph CA Active status post, CLL, no current treatment, under surveillance        Other:    (+) No chance of pregnancy C-spine cleared: N/A, no H/O Chronic Pain,             PHYSICAL EXAM:   Mental Status/Neuro: A/A/O   Airway: Facies: Feasible  Mallampati: I  Mouth/Opening: Full  TM distance: > 6 cm  Neck ROM: Full   Respiratory: Auscultation: CTAB     Resp. Rate: Normal     Resp. Effort: Normal      CV: Rhythm: Regular  Rate: José  Heart: Normal Sounds   Comments:      Dental: Normal                Preop Vitals  BP Readings from Last 3 Encounters:   06/19/19 126/77   06/18/19 118/76   05/29/19 (!) 139/92    Pulse Readings from Last 3 Encounters:   06/19/19 56   06/18/19 60   05/29/19 53      Resp Readings from Last 3 Encounters:   06/19/19 18    SpO2 Readings from Last 3 Encounters:   06/19/19 96%      Temp Readings from Last 1 Encounters:   06/19/19 97.5  F (36.4  C) (Oral)    Ht Readings from Last 1 Encounters:   06/19/19 1.88 m (6' 2\")      Wt Readings from Last 1 Encounters:   06/19/19 78.2 kg (172 lb 4.8 oz)    Estimated body mass index is 22.12 kg/m  as calculated from the following:    Height as of this encounter: 1.88 m (6' 2\").    Weight as of this encounter: 78.2 kg (172 lb 4.8 oz).       Temp: 97.5  F (36.4  C) Temp src: Oral BP: 126/77 Pulse: 56   Resp: 18 SpO2: 96 %         172 lbs 4.8 oz  6' 2\"   Body mass index is 22.12 kg/m .    Physical Exam  Constitutional: Awake, alert, cooperative, " Topical Sulfur Applications Pregnancy And Lactation Text: This medication is Pregnancy Category C and has an unknown safety profile during pregnancy. It is unknown if this topical medication is excreted in breast milk. Erythromycin Pregnancy And Lactation Text: This medication is Pregnancy Category B and is considered safe during pregnancy. It is also excreted in breast milk. no apparent distress, and appears stated age.  Eyes: Pupils equal, round and reactive to light, extra ocular muscles intact, sclera clear, conjunctiva normal.  HENT: Normocephalic, oral pharynx with moist mucus membranes, good dentition. No goiter appreciated. No removable dental hardware.  Respiratory: Clear to auscultation bilaterally, no crackles or wheezing. No SOB when supine.  Cardiovascular: Bradycardic, regular rhythm, normal S1 and S2, and no murmur noted.  Carotids +2, no bruits. No edema. Palpable pulses to radial, DP and PT arteries.   GI: Normal bowel sounds, soft, non-distended, non-tender, no masses palpated, no hepatomegaly.    Lymph/Hematologic: No cervical lymphadenopathy and no supraclavicular lymphadenopathy.  Genitourinary:  deferred  Skin: Warm and dry.  No rashes.   Musculoskeletal: Somewhat limited extension ROM of neck. There is no redness, warmth, or swelling of the joints. Gross motor strength is normal.    Neurologic: Awake, alert, oriented to name, place and time. Cranial nerves II-XII are grossly intact. Gait is normal. Ambulates from chair to exam table, seats self, lies supine & sits up w/o assistance.  Neuropsychiatric: Calm, cooperative. Normal affect. Pleasant. Answers questions appropriately, follows commands w/o difficulty.    PRIOR LABS/DIAGNOSTIC STUDIES:  All labs and imaging personally reviewed    CT ABDOMEN PELVIS WO CONTRAST 3/22/19:  CONCLUSION:   1.  Markedly urine-distended bladder which measures 18 x 10 x 9 cm as above.  2.  Splenomegaly with perisplenic varices may be reflective of portal hypertension.  3.  Hepatic steatosis with small hypodensities in the right and left hepatic lobes difficult to fully characterize but likely are cysts  4. ASCVD aorta with the aorta measuring 2.8 x 3.0 cm. AAA Size: 3.0 cm to 3.4 cm, Recommended Follow Up Ultrasound Aorta: Every 3 years  WBC 4.0 - 11.0 thou/uL 9.9    RBC 4.40 - 6.20 mill/uL 4.22Low     Hemoglobin 14.0 - 18.0 g/dL  13.6Low     Hematocrit 40.0 - 54.0 % 40.2    MCV 80 - 100 fL 95    MCH 27.0 - 34.0 pg 32.2    MCHC 32.0 - 36.0 g/dL 33.8    RDW 11.0 - 14.5 % 13.1    Platelets 140 - 440 thou/uL 126Low     MPV 8.5 - 12.5 fL 8.9    Resulting Agency  [facility]     Sodium 136 - 145 mmol/L 132Low     Potassium 3.5 - 5.0 mmol/L 3.8    Chloride 98 - 107 mmol/L 99    CO2 22 - 31 mmol/L 28    Anion Gap, Calculation 5 - 18 mmol/L 5    Glucose 70 - 125 mg/dL 73    BUN 8 - 22 mg/dL 8    Creatinine 0.70 - 1.30 mg/dL 0.84    GFR MDRD Af Amer >60 mL/min/1.73m2 >60    GFR MDRD Non Af Amer >60 mL/min/1.73m2 >60    Bilirubin, Total 0.0 - 1.0 mg/dL 0.8    Calcium 8.5 - 10.5 mg/dL 8.6    Protein, Total 6.0 - 8.0 g/dL 7.2    Albumin 3.5 - 5.0 g/dL 3.7    Alkaline Phosphatase 45 - 120 U/L 56    AST 0 - 40 U/L 21    ALT 0 - 45 U/L 14    Resulting Agency   [facility]     Labs today: not indicated    Outside records reviewed from: Care Everywhere    ASSESSMENT and PLAN  Hilario Meeks is a 69 year old male scheduled to undergo Holmium Laser Enucleation of the Prostate with Chester Turner MD on 6/27/19 at  [facility] for treatment of BPH with urinary retention.     He has the following specific operative considerations:      - Pt has had prior anesthetic. Type: MAC - no complications per pt  - Anesthesia considerations:  Refer to PAC assessment in anesthesia records  - Risk of PONV score = 2.  If > 2, anti-emetic intervention recommended.    CARDIAC: METS >4, rides bicycle daily      RCRI : No serious cardiac risks.  0.4% risk of major adverse cardiac event.     PULMONARY: YESENIA # of risks 2/8 = low risk     Never smoked, no asthma    GI: no GERD     RENAL/: BPH w/ urinary retention, has had catheter during past 10 weeks    ENDO: BMI 22, no DM    HEME/IMMUNE: VTE risk: 3%     CLL, not treated, under surveillance       ORTHO: Limited extenstion Neck ROM, no TMJ    Patient was discussed with Dr House.  Wartpeel Counseling:  I discussed with the patient the risks of Wartpeel including but not limited to erythema, scaling, itching, weeping, crusting, and pain. Itraconazole Pregnancy And Lactation Text: This medication is Pregnancy Category C and it isn't know if it is safe during pregnancy. It is also excreted in breast milk. Patient is optimized and is acceptable candidate for the proposed procedure. No further diagnostic evaluation is needed.      Arrival time, NPO, shower and medication instructions provided by nursing staff today.  Preparing For Your Surgery handout given.      Radhika Patterson PA-C  Preoperative Assessment Center  [facility]  [facility]  Phone: [phone]  Fax: [phone]   High Dose Vitamin A Counseling: Side effects reviewed, pt to contact office should one occur. Azathioprine Pregnancy And Lactation Text: This medication is Pregnancy Category D and isn't considered safe during pregnancy. It is unknown if this medication is excreted in breast milk. Oxybutynin Pregnancy And Lactation Text: This medication is Pregnancy Category B and is considered safe during pregnancy. It is unknown if it is excreted in breast milk. Erivedge Pregnancy And Lactation Text: This medication is Pregnancy Category X and is absolutely contraindicated during pregnancy. It is unknown if it is excreted in breast milk. High Dose Vitamin A Pregnancy And Lactation Text: High dose vitamin A therapy is contraindicated during pregnancy and breast feeding. Cellcept Counseling:  I discussed with the patient the risks of mycophenolate mofetil including but not limited to infection/immunosuppression, GI upset, hypokalemia, hypercholesterolemia, bone marrow suppression, lymphoproliferative disorders, malignancy, GI ulceration/bleed/perforation, colitis, interstitial lung disease, kidney failure, progressive multifocal leukoencephalopathy, and birth defects.  The patient understands that monitoring is required including a baseline creatinine and regular CBC testing. In addition, patient must alert us immediately if symptoms of infection or other concerning signs are noted. Finasteride Male Counseling: Finasteride Counseling:  I discussed with the patient the risks of use of finasteride including but not limited to decreased libido, decreased ejaculate volume, gynecomastia, and depression. Women should not handle medication.  All of the patient's questions and concerns were addressed. Siliq Pregnancy And Lactation Text: The risk during pregnancy and breastfeeding is uncertain with this medication. Metronidazole Counseling:  I discussed with the patient the risks of metronidazole including but not limited to seizures, nausea/vomiting, a metallic taste in the mouth, nausea/vomiting and severe allergy. Wartpeel Pregnancy And Lactation Text: This medication is Pregnancy Category X and contraindicated in pregnancy and in women who may become pregnant. It is unknown if this medication is excreted in breast milk. Ketoconazole Counseling:   Patient counseled regarding improving absorption with orange juice.  Adverse effects include but are not limited to breast enlargement, headache, diarrhea, nausea, upset stomach, liver function test abnormalities, taste disturbance, and stomach pain.  There is a rare possibility of liver failure that can occur when taking ketoconazole. The patient understands that monitoring of LFTs may be required, especially at baseline. The patient verbalized understanding of the proper use and possible adverse effects of ketoconazole.  All of the patient's questions and concerns were addressed. Metronidazole Pregnancy And Lactation Text: This medication is Pregnancy Category B and considered safe during pregnancy.  It is also excreted in breast milk. Propranolol Counseling:  I discussed with the patient the risks of propranolol including but not limited to low heart rate, low blood pressure, low blood sugar, restlessness and increased cold sensitivity. They should call the office if they experience any of these side effects. Imiquimod Counseling:  I discussed with the patient the risks of imiquimod including but not limited to erythema, scaling, itching, weeping, crusting, and pain.  Patient understands that the inflammatory response to imiquimod is variable from person to person and was educated regarded proper titration schedule.  If flu-like symptoms develop, patient knows to discontinue the medication and contact us. Cimzia Counseling:  I discussed with the patient the risks of Cimzia including but not limited to immunosuppression, allergic reactions and infections.  The patient understands that monitoring is required including a PPD at baseline and must alert us or the primary physician if symptoms of infection or other concerning signs are noted. Propranolol Pregnancy And Lactation Text: This medication is Pregnancy Category C and it isn't known if it is safe during pregnancy. It is excreted in breast milk. Imiquimod Pregnancy And Lactation Text: This medication is Pregnancy Category C. It is unknown if this medication is excreted in breast milk. Finasteride Pregnancy And Lactation Text: This medication is absolutely contraindicated during pregnancy. It is unknown if it is excreted in breast milk. Azithromycin Counseling:  I discussed with the patient the risks of azithromycin including but not limited to GI upset, allergic reaction, drug rash, diarrhea, and yeast infections. Ketoconazole Pregnancy And Lactation Text: This medication is Pregnancy Category C and it isn't know if it is safe during pregnancy. It is also excreted in breast milk and breast feeding isn't recommended. Zyclara Counseling:  I discussed with the patient the risks of imiquimod including but not limited to erythema, scaling, itching, weeping, crusting, and pain.  Patient understands that the inflammatory response to imiquimod is variable from person to person and was educated regarded proper titration schedule.  If flu-like symptoms develop, patient knows to discontinue the medication and contact us. Minocycline Counseling: Patient advised regarding possible photosensitivity and discoloration of the teeth, skin, lips, tongue and gums.  Patient instructed to avoid sunlight, if possible.  When exposed to sunlight, patients should wear protective clothing, sunglasses, and sunscreen.  The patient was instructed to call the office immediately if the following severe adverse effects occur:  hearing changes, easy bruising/bleeding, severe headache, or vision changes.  The patient verbalized understanding of the proper use and possible adverse effects of minocycline.  All of the patient's questions and concerns were addressed. Simponi Counseling:  I discussed with the patient the risks of golimumab including but not limited to myelosuppression, immunosuppression, autoimmune hepatitis, demyelinating diseases, lymphoma, and serious infections.  The patient understands that monitoring is required including a PPD at baseline and must alert us or the primary physician if symptoms of infection or other concerning signs are noted. Cimzia Pregnancy And Lactation Text: This medication crosses the placenta but can be considered safe in certain situations. Cimzia may be excreted in breast milk. Minoxidil Counseling: Minoxidil is a topical medication which can increase blood flow where it is applied. It is uncertain how this medication increases hair growth. Side effects are uncommon and include stinging and allergic reactions. Benzoyl Peroxide Counseling: Patient counseled that medicine may cause skin irritation and bleach clothing.  In the event of skin irritation, the patient was advised to reduce the amount of the drug applied or use it less frequently.   The patient verbalized understanding of the proper use and possible adverse effects of benzoyl peroxide.  All of the patient's questions and concerns were addressed. Azithromycin Pregnancy And Lactation Text: This medication is considered safe during pregnancy and is also secreted in breast milk. Birth Control Pills Counseling: Birth Control Pill Counseling: I discussed with the patient the potential side effects of OCPs including but not limited to increased risk of stroke, heart attack, thrombophlebitis, deep venous thrombosis, hepatic adenomas, breast changes, GI upset, headaches, and depression.  The patient verbalized understanding of the proper use and possible adverse effects of OCPs. All of the patient's questions and concerns were addressed. Cyclophosphamide Counseling:  I discussed with the patient the risks of cyclophosphamide including but not limited to hair loss, hormonal abnormalities, decreased fertility, abdominal pain, diarrhea, nausea and vomiting, bone marrow suppression and infection. The patient understands that monitoring is required while taking this medication. Benzoyl Peroxide Pregnancy And Lactation Text: This medication is Pregnancy Category C. It is unknown if benzoyl peroxide is excreted in breast milk. Bactrim Counseling:  I discussed with the patient the risks of sulfa antibiotics including but not limited to GI upset, allergic reaction, drug rash, diarrhea, dizziness, photosensitivity, and yeast infections.  Rarely, more serious reactions can occur including but not limited to aplastic anemia, agranulocytosis, methemoglobinemia, blood dyscrasias, liver or kidney failure, lung infiltrates or desquamative/blistering drug rashes. Gabapentin Counseling: I discussed with the patient the risks of gabapentin including but not limited to dizziness, somnolence, fatigue and ataxia. Minocycline Pregnancy And Lactation Text: This medication is Pregnancy Category D and not consider safe during pregnancy. It is also excreted in breast milk. Terbinafine Counseling: Patient counseling regarding adverse effects of terbinafine including but not limited to headache, diarrhea, rash, upset stomach, liver function test abnormalities, itching, taste/smell disturbance, nausea, abdominal pain, and flatulence.  There is a rare possibility of liver failure that can occur when taking terbinafine.  The patient understands that a baseline LFT and kidney function test may be required. The patient verbalized understanding of the proper use and possible adverse effects of terbinafine.  All of the patient's questions and concerns were addressed. Terbinafine Pregnancy And Lactation Text: This medication is Pregnancy Category B and is considered safe during pregnancy. It is also excreted in breast milk and breast feeding isn't recommended. Gabapentin Pregnancy And Lactation Text: This medication is Pregnancy Category C and isn't considered safe during pregnancy. It is excreted in breast milk. Quinolones Counseling:  I discussed with the patient the risks of fluoroquinolones including but not limited to GI upset, allergic reaction, drug rash, diarrhea, dizziness, photosensitivity, yeast infections, liver function test abnormalities, tendonitis/tendon rupture. Cyclophosphamide Pregnancy And Lactation Text: This medication is Pregnancy Category D and it isn't considered safe during pregnancy. This medication is excreted in breast milk. Skyrizi Counseling: I discussed with the patient the risks of risankizumab-rzaa including but not limited to immunosuppression, and serious infections.  The patient understands that monitoring is required including a PPD at baseline and must alert us or the primary physician if symptoms of infection or other concerning signs are noted. Birth Control Pills Pregnancy And Lactation Text: This medication should be avoided if pregnant and for the first 30 days post-partum. Cosentyx Counseling:  I discussed with the patient the risks of Cosentyx including but not limited to worsening of Crohn's disease, immunosuppression, allergic reactions and infections.  The patient understands that monitoring is required including a PPD at baseline and must alert us or the primary physician if symptoms of infection or other concerning signs are noted. Mirvaso Counseling: Mirvaso is a topical medication which can decrease superficial blood flow where applied. Side effects are uncommon and include stinging, redness and allergic reactions. Carac Counseling:  I discussed with the patient the risks of Carac including but not limited to erythema, scaling, itching, weeping, crusting, and pain. Cosentyx Pregnancy And Lactation Text: This medication is Pregnancy Category B and is considered safe during pregnancy. It is unknown if this medication is excreted in breast milk. Opioid Counseling: I discussed with the patient the potential side effects of opioids including but not limited to addiction, altered mental status, and depression. I stressed avoiding alcohol, benzodiazepines, muscle relaxants and sleep aids unless specifically okayed by a physician. The patient verbalized understanding of the proper use and possible adverse effects of opioids. All of the patient's questions and concerns were addressed. They were instructed to flush the remaining pills down the toilet if they did not need them for pain. Cyclosporine Counseling:  I discussed with the patient the risks of cyclosporine including but not limited to hypertension, gingival hyperplasia,myelosuppression, immunosuppression, liver damage, kidney damage, neurotoxicity, lymphoma, and serious infections. The patient understands that monitoring is required including baseline blood pressure, CBC, CMP, lipid panel and uric acid, and then 1-2 times monthly CMP and blood pressure. Spironolactone Counseling: Patient advised regarding risks of diarrhea, abdominal pain, hyperkalemia, birth defects (for female patients), liver toxicity and renal toxicity. The patient may need blood work to monitor liver and kidney function and potassium levels while on therapy. The patient verbalized understanding of the proper use and possible adverse effects of spironolactone.  All of the patient's questions and concerns were addressed. Glycopyrrolate Counseling:  I discussed with the patient the risks of glycopyrrolate including but not limited to skin rash, drowsiness, dry mouth, difficulty urinating, and blurred vision. Cyclosporine Pregnancy And Lactation Text: This medication is Pregnancy Category C and it isn't know if it is safe during pregnancy. This medication is excreted in breast milk. Glycopyrrolate Pregnancy And Lactation Text: This medication is Pregnancy Category B and is considered safe during pregnancy. It is unknown if it is excreted breast milk. Stelara Counseling:  I discussed with the patient the risks of ustekinumab including but not limited to immunosuppression, malignancy, posterior leukoencephalopathy syndrome, and serious infections.  The patient understands that monitoring is required including a PPD at baseline and must alert us or the primary physician if symptoms of infection or other concerning signs are noted. Opioid Pregnancy And Lactation Text: These medications can lead to premature delivery and should be avoided during pregnancy. These medications are also present in breast milk in small amounts. Mirvaso Pregnancy And Lactation Text: This medication has not been assigned a Pregnancy Risk Category. It is unknown if the medication is excreted in breast milk. Picato Counseling:  I discussed with the patient the risks of Picato including but not limited to erythema, scaling, itching, weeping, crusting, and pain. Rifampin Counseling: I discussed with the patient the risks of rifampin including but not limited to liver damage, kidney damage, red-orange body fluids, nausea/vomiting and severe allergy. Spironolactone Pregnancy And Lactation Text: This medication can cause feminization of the male fetus and should be avoided during pregnancy. The active metabolite is also found in breast milk. Dupixent Counseling: I discussed with the patient the risks of dupilumab including but not limited to eye infection and irritation, cold sores, injection site reactions, worsening of asthma, allergic reactions and increased risk of parasitic infection.  Live vaccines should be avoided while taking dupilumab. Dupilumab will also interact with certain medications such as warfarin and cyclosporine. The patient understands that monitoring is required and they must alert us or the primary physician if symptoms of infection or other concerning signs are noted. Rhofade Counseling: Rhofade is a topical medication which can decrease superficial blood flow where applied. Side effects are uncommon and include stinging, redness and allergic reactions. Cimetidine Counseling:  I discussed with the patient the risks of Cimetidine including but not limited to gynecomastia, headache, diarrhea, nausea, drowsiness, arrhythmias, pancreatitis, skin rashes, psychosis, bone marrow suppression and kidney toxicity. Hydroxychloroquine Counseling:  I discussed with the patient that a baseline ophthalmologic exam is needed at the start of therapy and every year thereafter while on therapy. A CBC may also be warranted for monitoring.  The side effects of this medication were discussed with the patient, including but not limited to agranulocytosis, aplastic anemia, seizures, rashes, retinopathy, and liver toxicity. Patient instructed to call the office should any adverse effect occur.  The patient verbalized understanding of the proper use and possible adverse effects of Plaquenil.  All the patient's questions and concerns were addressed. Dupixent Pregnancy And Lactation Text: This medication likely crosses the placenta but the risk for the fetus is uncertain. This medication is excreted in breast milk. Calcipotriene Counseling:  I discussed with the patient the risks of calcipotriene including but not limited to erythema, scaling, itching, and irritation. Methotrexate Counseling:  Patient counseled regarding adverse effects of methotrexate including but not limited to nausea, vomiting, abnormalities in liver function tests. Patients may develop mouth sores, rash, diarrhea, and abnormalities in blood counts. The patient understands that monitoring is required including LFT's and blood counts.  There is a rare possibility of scarring of the liver and lung problems that can occur when taking methotrexate. Persistent nausea, loss of appetite, pale stools, dark urine, cough, and shortness of breath should be reported immediately. Patient advised to discontinue methotrexate treatment at least three months before attempting to become pregnant.  I discussed the need for folate supplements while taking methotrexate.  These supplements can decrease side effects during methotrexate treatment. The patient verbalized understanding of the proper use and possible adverse effects of methotrexate.  All of the patient's questions and concerns were addressed. SSKI Counseling:  I discussed with the patient the risks of SSKI including but not limited to thyroid abnormalities, metallic taste, GI upset, fever, headache, acne, arthralgias, paraesthesias, lymphadenopathy, easy bleeding, arrhythmias, and allergic reaction. Rifampin Pregnancy And Lactation Text: This medication is Pregnancy Category C and it isn't know if it is safe during pregnancy. It is also excreted in breast milk and should not be used if you are breast feeding. Enbrel Counseling:  I discussed with the patient the risks of etanercept including but not limited to myelosuppression, immunosuppression, autoimmune hepatitis, demyelinating diseases, lymphoma, and infections.  The patient understands that monitoring is required including a PPD at baseline and must alert us or the primary physician if symptoms of infection or other concerning signs are noted. Solaraze Counseling:  I discussed with the patient the risks of Solaraze including but not limited to erythema, scaling, itching, weeping, crusting, and pain. Doxepin Counseling:  Patient advised that the medication is sedating and not to drive a car after taking this medication. Patient informed of potential adverse effects including but not limited to dry mouth, urinary retention, and blurry vision.  The patient verbalized understanding of the proper use and possible adverse effects of doxepin.  All of the patient's questions and concerns were addressed. Calcipotriene Pregnancy And Lactation Text: This medication has not been proven safe during pregnancy. It is unknown if this medication is excreted in breast milk. Sski Pregnancy And Lactation Text: This medication is Pregnancy Category D and isn't considered safe during pregnancy. It is excreted in breast milk. Methotrexate Pregnancy And Lactation Text: This medication is Pregnancy Category X and is known to cause fetal harm. This medication is excreted in breast milk. Hydroxychloroquine Pregnancy And Lactation Text: This medication has been shown to cause fetal harm but it isn't assigned a Pregnancy Risk Category. There are small amounts excreted in breast milk. Taltz Counseling: I discussed with the patient the risks of ixekizumab including but not limited to immunosuppression, serious infections, worsening of inflammatory bowel disease and drug reactions.  The patient understands that monitoring is required including a PPD at baseline and must alert us or the primary physician if symptoms of infection or other concerning signs are noted. 5-Fu Counseling: 5-Fluorouracil Counseling:  I discussed with the patient the risks of 5-fluorouracil including but not limited to erythema, scaling, itching, weeping, crusting, and pain. Prednisone Counseling:  I discussed with the patient the risks of prolonged use of prednisone including but not limited to weight gain, insomnia, osteoporosis, mood changes, diabetes, susceptibility to infection, glaucoma and high blood pressure.  In cases where prednisone use is prolonged, patients should be monitored with blood pressure checks, serum glucose levels and an eye exam.  Additionally, the patient may need to be placed on GI prophylaxis, PCP prophylaxis, and calcium and vitamin D supplementation and/or a bisphosphonate.  The patient verbalized understanding of the proper use and the possible adverse effects of prednisone.  All of the patient's questions and concerns were addressed. Protopic Counseling: Patient may experience a mild burning sensation during topical application. Protopic is not approved in children less than 2 years of age. There have been case reports of hematologic and skin malignancies in patients using topical calcineurin inhibitors although causality is questionable. Sarecycline Counseling: Patient advised regarding possible photosensitivity and discoloration of the teeth, skin, lips, tongue and gums.  Patient instructed to avoid sunlight, if possible.  When exposed to sunlight, patients should wear protective clothing, sunglasses, and sunscreen.  The patient was instructed to call the office immediately if the following severe adverse effects occur:  hearing changes, easy bruising/bleeding, severe headache, or vision changes.  The patient verbalized understanding of the proper use and possible adverse effects of sarecycline.  All of the patient's questions and concerns were addressed. Niacinamide Counseling: I recommended taking niacin or niacinamide, also know as vitamin B3, twice daily. Recent evidence suggests that taking vitamin B3 (500 mg twice daily) can reduce the risk of actinic keratoses and non-melanoma skin cancers. Side effects of vitamin B3 include flushing and headache. Doxepin Pregnancy And Lactation Text: This medication is Pregnancy Category C and it isn't known if it is safe during pregnancy. It is also excreted in breast milk and breast feeding isn't recommended. Arava Counseling:  Patient counseled regarding adverse effects of Arava including but not limited to nausea, vomiting, abnormalities in liver function tests. Patients may develop mouth sores, rash, diarrhea, and abnormalities in blood counts. The patient understands that monitoring is required including LFTs and blood counts.  There is a rare possibility of scarring of the liver and lung problems that can occur when taking methotrexate. Persistent nausea, loss of appetite, pale stools, dark urine, cough, and shortness of breath should be reported immediately. Patient advised to discontinue Arava treatment and consult with a physician prior to attempting conception. The patient will have to undergo a treatment to eliminate Arava from the body prior to conception. Thalidomide Counseling: I discussed with the patient the risks of thalidomide including but not limited to birth defects, anxiety, weakness, chest pain, dizziness, cough and severe allergy. Solaraze Pregnancy And Lactation Text: This medication is Pregnancy Category B and is considered safe. There is some data to suggest avoiding during the third trimester. It is unknown if this medication is excreted in breast milk. Humira Counseling:  I discussed with the patient the risks of adalimumab including but not limited to myelosuppression, immunosuppression, autoimmune hepatitis, demyelinating diseases, lymphoma, and serious infections.  The patient understands that monitoring is required including a PPD at baseline and must alert us or the primary physician if symptoms of infection or other concerning signs are noted. Bactrim Pregnancy And Lactation Text: This medication is Pregnancy Category D and is known to cause fetal risk.  It is also excreted in breast milk. Hydroxyzine Counseling: Patient advised that the medication is sedating and not to drive a car after taking this medication.  Patient informed of potential adverse effects including but not limited to dry mouth, urinary retention, and blurry vision.  The patient verbalized understanding of the proper use and possible adverse effects of hydroxyzine.  All of the patient's questions and concerns were addressed. Topical Retinoid counseling:  Patient advised to apply a pea-sized amount only at bedtime and wait 30 minutes after washing their face before applying.  If too drying, patient may add a non-comedogenic moisturizer. The patient verbalized understanding of the proper use and possible adverse effects of retinoids.  All of the patient's questions and concerns were addressed. Tetracycline Counseling: Patient counseled regarding possible photosensitivity and increased risk for sunburn.  Patient instructed to avoid sunlight, if possible.  When exposed to sunlight, patients should wear protective clothing, sunglasses, and sunscreen.  The patient was instructed to call the office immediately if the following severe adverse effects occur:  hearing changes, easy bruising/bleeding, severe headache, or vision changes.  The patient verbalized understanding of the proper use and possible adverse effects of tetracycline.  All of the patient's questions and concerns were addressed. Patient understands to avoid pregnancy while on therapy due to potential birth defects. Protopic Pregnancy And Lactation Text: This medication is Pregnancy Category C. It is unknown if this medication is excreted in breast milk when applied topically. Niacinamide Pregnancy And Lactation Text: These medications are considered safe during pregnancy. Tremfya Counseling: I discussed with the patient the risks of guselkumab including but not limited to immunosuppression, serious infections, worsening of inflammatory bowel disease and drug reactions.  The patient understands that monitoring is required including a PPD at baseline and must alert us or the primary physician if symptoms of infection or other concerning signs are noted. Clofazimine Counseling:  I discussed with the patient the risks of clofazimine including but not limited to skin and eye pigmentation, liver damage, nausea/vomiting, gastrointestinal bleeding and allergy. Drysol Counseling:  I discussed with the patient the risks of drysol/aluminum chloride including but not limited to skin rash, itching, irritation, burning. Cephalexin Counseling: I counseled the patient regarding use of cephalexin as an antibiotic for prophylactic and/or therapeutic purposes. Cephalexin (commonly prescribed under brand name Keflex) is a cephalosporin antibiotic which is active against numerous classes of bacteria, including most skin bacteria. Side effects may include nausea, diarrhea, gastrointestinal upset, rash, hives, yeast infections, and in rare cases, hepatitis, kidney disease, seizures, fever, confusion, neurologic symptoms, and others. Patients with severe allergies to penicillin medications are cautioned that there is about a 10% incidence of cross-reactivity with cephalosporins. When possible, patients with penicillin allergies should use alternatives to cephalosporins for antibiotic therapy. Cephalexin Pregnancy And Lactation Text: This medication is Pregnancy Category B and considered safe during pregnancy.  It is also excreted in breast milk but can be used safely for shorter doses. Tranexamic Acid Counseling:  Patient advised of the small risk of bleeding problems with tranexamic acid. They were also instructed to call if they developed any nausea, vomiting or diarrhea. All of the patient's questions and concerns were addressed. Nsaids Counseling: NSAID Counseling: I discussed with the patient that NSAIDs should be taken with food. Prolonged use of NSAIDs can result in the development of stomach ulcers.  Patient advised to stop taking NSAIDs if abdominal pain occurs.  The patient verbalized understanding of the proper use and possible adverse effects of NSAIDs.  All of the patient's questions and concerns were addressed. Hydroxyzine Pregnancy And Lactation Text: This medication is not safe during pregnancy and should not be taken. It is also excreted in breast milk and breast feeding isn't recommended. Nsaids Pregnancy And Lactation Text: These medications are considered safe up to 30 weeks gestation. It is excreted in breast milk. Drysol Pregnancy And Lactation Text: This medication is considered safe during pregnancy and breast feeding. Acitretin Counseling:  I discussed with the patient the risks of acitretin including but not limited to hair loss, dry lips/skin/eyes, liver damage, hyperlipidemia, depression/suicidal ideation, photosensitivity.  Serious rare side effects can include but are not limited to pancreatitis, pseudotumor cerebri, bony changes, clot formation/stroke/heart attack.  Patient understands that alcohol is contraindicated since it can result in liver toxicity and significantly prolong the elimination of the drug by many years. Tranexamic Acid Pregnancy And Lactation Text: It is unknown if this medication is safe during pregnancy or breast feeding. Xeljanz Counseling: I discussed with the patient the risks of Xeljanz therapy including increased risk of infection, liver issues, headache, diarrhea, or cold symptoms. Live vaccines should be avoided. They were instructed to call if they have any problems. Ilumya Counseling: I discussed with the patient the risks of tildrakizumab including but not limited to immunosuppression, malignancy, posterior leukoencephalopathy syndrome, and serious infections.  The patient understands that monitoring is required including a PPD at baseline and must alert us or the primary physician if symptoms of infection or other concerning signs are noted. Clindamycin Counseling: I counseled the patient regarding use of clindamycin as an antibiotic for prophylactic and/or therapeutic purposes. Clindamycin is active against numerous classes of bacteria, including skin bacteria. Side effects may include nausea, diarrhea, gastrointestinal upset, rash, hives, yeast infections, and in rare cases, colitis. Tazorac Counseling:  Patient advised that medication is irritating and drying.  Patient may need to apply sparingly and wash off after an hour before eventually leaving it on overnight.  The patient verbalized understanding of the proper use and possible adverse effects of tazorac.  All of the patient's questions and concerns were addressed. Valtrex Counseling: I discussed with the patient the risks of valacyclovir including but not limited to kidney damage, nausea, vomiting and severe allergy.  The patient understands that if the infection seems to be worsening or is not improving, they are to call. Albendazole Counseling:  I discussed with the patient the risks of albendazole including but not limited to cytopenia, kidney damage, nausea/vomiting and severe allergy.  The patient understands that this medication is being used in an off-label manner. Tazorac Pregnancy And Lactation Text: This medication is not safe during pregnancy. It is unknown if this medication is excreted in breast milk. Elidel Counseling: Patient may experience a mild burning sensation during topical application. Elidel is not approved in children less than 2 years of age. There have been case reports of hematologic and skin malignancies in patients using topical calcineurin inhibitors although causality is questionable. Colchicine Counseling:  Patient counseled regarding adverse effects including but not limited to stomach upset (nausea, vomiting, stomach pain, or diarrhea).  Patient instructed to limit alcohol consumption while taking this medication.  Colchicine may reduce blood counts especially with prolonged use.  The patient understands that monitoring of kidney function and blood counts may be required, especially at baseline. The patient verbalized understanding of the proper use and possible adverse effects of colchicine.  All of the patient's questions and concerns were addressed. Acitretin Pregnancy And Lactation Text: This medication is Pregnancy Category X and should not be given to women who are pregnant or may become pregnant in the future. This medication is excreted in breast milk. Odomzo Counseling- I discussed with the patient the risks of Odomzo including but not limited to nausea, vomiting, diarrhea, constipation, weight loss, changes in the sense of taste, decreased appetite, muscle spasms, and hair loss.  The patient verbalized understanding of the proper use and possible adverse effects of Odomzo.  All of the patient's questions and concerns were addressed. Xelalanz Pregnancy And Lactation Text: This medication is Pregnancy Category D and is not considered safe during pregnancy.  The risk during breast feeding is also uncertain. Bexarotene Counseling:  I discussed with the patient the risks of bexarotene including but not limited to hair loss, dry lips/skin/eyes, liver abnormalities, hyperlipidemia, pancreatitis, depression/suicidal ideation, photosensitivity, drug rash/allergic reactions, hypothyroidism, anemia, leukopenia, infection, cataracts, and teratogenicity.  Patient understands that they will need regular blood tests to check lipid profile, liver function tests, white blood cell count, thyroid function tests and pregnancy test if applicable. Xolair Counseling:  Patient informed of potential adverse effects including but not limited to fever, muscle aches, rash and allergic reactions.  The patient verbalized understanding of the proper use and possible adverse effects of Xolair.  All of the patient's questions and concerns were addressed. Detail Level: Simple Clindamycin Pregnancy And Lactation Text: This medication can be used in pregnancy if certain situations. Clindamycin is also present in breast milk. Fluconazole Counseling:  Patient counseled regarding adverse effects of fluconazole including but not limited to headache, diarrhea, nausea, upset stomach, liver function test abnormalities, taste disturbance, and stomach pain.  There is a rare possibility of liver failure that can occur when taking fluconazole.  The patient understands that monitoring of LFTs and kidney function test may be required, especially at baseline. The patient verbalized understanding of the proper use and possible adverse effects of fluconazole.  All of the patient's questions and concerns were addressed. Doxycycline Counseling:  Patient counseled regarding possible photosensitivity and increased risk for sunburn.  Patient instructed to avoid sunlight, if possible.  When exposed to sunlight, patients should wear protective clothing, sunglasses, and sunscreen.  The patient was instructed to call the office immediately if the following severe adverse effects occur:  hearing changes, easy bruising/bleeding, severe headache, or vision changes.  The patient verbalized understanding of the proper use and possible adverse effects of doxycycline.  All of the patient's questions and concerns were addressed. Valtrex Pregnancy And Lactation Text: this medication is Pregnancy Category B and is considered safe during pregnancy. This medication is not directly found in breast milk but it's metabolite acyclovir is present. Infliximab Counseling:  I discussed with the patient the risks of infliximab including but not limited to myelosuppression, immunosuppression, autoimmune hepatitis, demyelinating diseases, lymphoma, and serious infections.  The patient understands that monitoring is required including a PPD at baseline and must alert us or the primary physician if symptoms of infection or other concerning signs are noted. Albendazole Pregnancy And Lactation Text: This medication is Pregnancy Category C and it isn't known if it is safe during pregnancy. It is also excreted in breast milk. Topical Clindamycin Counseling: Patient counseled that this medication may cause skin irritation or allergic reactions.  In the event of skin irritation, the patient was advised to reduce the amount of the drug applied or use it less frequently.   The patient verbalized understanding of the proper use and possible adverse effects of clindamycin.  All of the patient's questions and concerns were addressed. Otezla Counseling: The side effects of Otezla were discussed with the patient, including but not limited to worsening or new depression, weight loss, diarrhea, nausea, upper respiratory tract infection, and headache. Patient instructed to call the office should any adverse effect occur.  The patient verbalized understanding of the proper use and possible adverse effects of Otezla.  All the patient's questions and concerns were addressed. Ivermectin Counseling:  Patient instructed to take medication on an empty stomach with a full glass of water.  Patient informed of potential adverse effects including but not limited to nausea, diarrhea, dizziness, itching, and swelling of the extremities or lymph nodes.  The patient verbalized understanding of the proper use and possible adverse effects of ivermectin.  All of the patient's questions and concerns were addressed. Eucrisa Counseling: Patient may experience a mild burning sensation during topical application. Eucrisa is not approved in children less than 2 years of age. Bexarotene Pregnancy And Lactation Text: This medication is Pregnancy Category X and should not be given to women who are pregnant or may become pregnant. This medication should not be used if you are breast feeding. Xolair Pregnancy And Lactation Text: This medication is Pregnancy Category B and is considered safe during pregnancy. This medication is excreted in breast milk. Use Enhanced Medication Counseling?: No Doxycycline Pregnancy And Lactation Text: This medication is Pregnancy Category D and not consider safe during pregnancy. It is also excreted in breast milk but is considered safe for shorter treatment courses. Griseofulvin Counseling:  I discussed with the patient the risks of griseofulvin including but not limited to photosensitivity, cytopenia, liver damage, nausea/vomiting and severe allergy.  The patient understands that this medication is best absorbed when taken with a fatty meal (e.g., ice cream or french fries). Dapsone Counseling: I discussed with the patient the risks of dapsone including but not limited to hemolytic anemia, agranulocytosis, rashes, methemoglobinemia, kidney failure, peripheral neuropathy, headaches, GI upset, and liver toxicity.  Patients who start dapsone require monitoring including baseline LFTs and weekly CBCs for the first month, then every month thereafter.  The patient verbalized understanding of the proper use and possible adverse effects of dapsone.  All of the patient's questions and concerns were addressed. Rituxan Counseling:  I discussed with the patient the risks of Rituxan infusions. Side effects can include infusion reactions, severe drug rashes including mucocutaneous reactions, reactivation of latent hepatitis and other infections and rarely progressive multifocal leukoencephalopathy.  All of the patient's questions and concerns were addressed. Dapsone Pregnancy And Lactation Text: This medication is Pregnancy Category C and is not considered safe during pregnancy or breast feeding. Topical Sulfur Applications Counseling: Topical Sulfur Counseling: Patient counseled that this medication may cause skin irritation or allergic reactions.  In the event of skin irritation, the patient was advised to reduce the amount of the drug applied or use it less frequently.   The patient verbalized understanding of the proper use and possible adverse effects of topical sulfur application.  All of the patient's questions and concerns were addressed. Isotretinoin Counseling: Patient should get monthly blood tests, not donate blood, not drive at night if vision affected, not share medication, and not undergo elective surgery for 6 months after tx completed. Side effects reviewed, pt to contact office should one occur. Otezla Pregnancy And Lactation Text: This medication is Pregnancy Category C and it isn't known if it is safe during pregnancy. It is unknown if it is excreted in breast milk. Erythromycin Counseling:  I discussed with the patient the risks of erythromycin including but not limited to GI upset, allergic reaction, drug rash, diarrhea, increase in liver enzymes, and yeast infections. Griseofulvin Pregnancy And Lactation Text: This medication is Pregnancy Category X and is known to cause serious birth defects. It is unknown if this medication is excreted in breast milk but breast feeding should be avoided. Itraconazole Counseling:  I discussed with the patient the risks of itraconazole including but not limited to liver damage, nausea/vomiting, neuropathy, and severe allergy.  The patient understands that this medication is best absorbed when taken with acidic beverages such as non-diet cola or ginger ale.  The patient understands that monitoring is required including baseline LFTs and repeat LFTs at intervals.  The patient understands that they are to contact us or the primary physician if concerning signs are noted. Erivedge Counseling- I discussed with the patient the risks of Erivedge including but not limited to nausea, vomiting, diarrhea, constipation, weight loss, changes in the sense of taste, decreased appetite, muscle spasms, and hair loss.  The patient verbalized understanding of the proper use and possible adverse effects of Erivedge.  All of the patient's questions and concerns were addressed. Isotretinoin Pregnancy And Lactation Text: This medication is Pregnancy Category X and is considered extremely dangerous during pregnancy. It is unknown if it is excreted in breast milk. Oxybutynin Counseling:  I discussed with the patient the risks of oxybutynin including but not limited to skin rash, drowsiness, dry mouth, difficulty urinating, and blurred vision. Azathioprine Counseling:  I discussed with the patient the risks of azathioprine including but not limited to myelosuppression, immunosuppression, hepatotoxicity, lymphoma, and infections.  The patient understands that monitoring is required including baseline LFTs, Creatinine, possible TPMP genotyping and weekly CBCs for the first month and then every 2 weeks thereafter.  The patient verbalized understanding of the proper use and possible adverse effects of azathioprine.  All of the patient's questions and concerns were addressed. Rituxan Pregnancy And Lactation Text: This medication is Pregnancy Category C and it isn't know if it is safe during pregnancy. It is unknown if this medication is excreted in breast milk but similar antibodies are known to be excreted. Hydroquinone Counseling:  Patient advised that medication may result in skin irritation, lightening (hypopigmentation), dryness, and burning.  In the event of skin irritation, the patient was advised to reduce the amount of the drug applied or use it less frequently.  Rarely, spots that are treated with hydroquinone can become darker (pseudoochronosis).  Should this occur, patient instructed to stop medication and call the office. The patient verbalized understanding of the proper use and possible adverse effects of hydroquinone.  All of the patient's questions and concerns were addressed.

## 2024-02-02 ENCOUNTER — PATIENT OUTREACH (OUTPATIENT)
Dept: GASTROENTEROLOGY | Facility: CLINIC | Age: 74
End: 2024-02-02
Payer: COMMERCIAL

## 2024-03-04 ENCOUNTER — PATIENT OUTREACH (OUTPATIENT)
Dept: GASTROENTEROLOGY | Facility: CLINIC | Age: 74
End: 2024-03-04
Payer: COMMERCIAL

## 2024-03-04 DIAGNOSIS — Z12.11 SPECIAL SCREENING FOR MALIGNANT NEOPLASMS, COLON: Primary | ICD-10-CM

## 2024-03-04 NOTE — TELEPHONE ENCOUNTER
"CRC Screening Colonoscopy Referral Review    Patient meets the inclusion criteria for screening colonoscopy standing order.    Ordering/Referring Provider:  Matthew Hopper     BMI: Estimated body mass index is 22.25 kg/m  as calculated from the following:    Height as of 4/13/23: 1.867 m (6' 1.5\").    Weight as of 6/29/23: 77.6 kg (171 lb).     Sedation:  Does patient have any of the following conditions affecting sedation?  No medical conditions affecting sedation.    Previous Scopes:  Any previous recommendations or follow up needs based on previous scope?  Prep recommendations: Double prep     Medical Concerns to Postpone Order:  Does patient have any of the following medical concerns that should postpone/delay colonoscopy referral?  No medical conditions affecting colonoscopy referral.    Final Referral Details:  Based on patient's medical history patient is appropriate for referral order with moderate sedation. If patient's BMI > 50 do not schedule in ASC.    Radha Brown RN on 3/4/2024 at 9:53 AM    "

## 2024-07-01 DIAGNOSIS — D47.2 MGUS (MONOCLONAL GAMMOPATHY OF UNKNOWN SIGNIFICANCE): Primary | ICD-10-CM

## 2024-07-05 ENCOUNTER — LAB (OUTPATIENT)
Dept: INFUSION THERAPY | Facility: HOSPITAL | Age: 74
End: 2024-07-05
Attending: INTERNAL MEDICINE
Payer: COMMERCIAL

## 2024-07-05 DIAGNOSIS — D47.2 MGUS (MONOCLONAL GAMMOPATHY OF UNKNOWN SIGNIFICANCE): ICD-10-CM

## 2024-07-05 LAB
ALBUMIN SERPL BCG-MCNC: 3.8 G/DL (ref 3.5–5.2)
ALP SERPL-CCNC: 71 U/L (ref 40–150)
ALT SERPL W P-5'-P-CCNC: 15 U/L (ref 0–70)
ANION GAP SERPL CALCULATED.3IONS-SCNC: 3 MMOL/L (ref 7–15)
AST SERPL W P-5'-P-CCNC: 21 U/L (ref 0–45)
BASOPHILS # BLD AUTO: 0 10E3/UL (ref 0–0.2)
BASOPHILS NFR BLD AUTO: 1 %
BILIRUB SERPL-MCNC: 0.7 MG/DL
BUN SERPL-MCNC: 15 MG/DL (ref 8–23)
CALCIUM SERPL-MCNC: 8.9 MG/DL (ref 8.8–10.2)
CHLORIDE SERPL-SCNC: 96 MMOL/L (ref 98–107)
CREAT SERPL-MCNC: 0.74 MG/DL (ref 0.67–1.17)
DEPRECATED HCO3 PLAS-SCNC: 33 MMOL/L (ref 22–29)
EGFRCR SERPLBLD CKD-EPI 2021: >90 ML/MIN/1.73M2
EOSINOPHIL # BLD AUTO: 0.1 10E3/UL (ref 0–0.7)
EOSINOPHIL NFR BLD AUTO: 3 %
ERYTHROCYTE [DISTWIDTH] IN BLOOD BY AUTOMATED COUNT: 12.7 % (ref 10–15)
GLUCOSE SERPL-MCNC: 91 MG/DL (ref 70–99)
HCT VFR BLD AUTO: 42.7 % (ref 40–53)
HGB BLD-MCNC: 14.9 G/DL (ref 13.3–17.7)
IGA SERPL-MCNC: 14 MG/DL (ref 84–499)
IGG SERPL-MCNC: 2235 MG/DL (ref 610–1616)
IGM SERPL-MCNC: 62 MG/DL (ref 35–242)
IMM GRANULOCYTES # BLD: 0 10E3/UL
IMM GRANULOCYTES NFR BLD: 0 %
KAPPA LC FREE SER-MCNC: 0.84 MG/DL (ref 0.33–1.94)
KAPPA LC FREE/LAMBDA FREE SER NEPH: 0.07 {RATIO} (ref 0.26–1.65)
LAMBDA LC FREE SERPL-MCNC: 12.64 MG/DL (ref 0.57–2.63)
LYMPHOCYTES # BLD AUTO: 0.8 10E3/UL (ref 0.8–5.3)
LYMPHOCYTES NFR BLD AUTO: 26 %
MCH RBC QN AUTO: 33.1 PG (ref 26.5–33)
MCHC RBC AUTO-ENTMCNC: 34.9 G/DL (ref 31.5–36.5)
MCV RBC AUTO: 95 FL (ref 78–100)
MONOCYTES # BLD AUTO: 0.2 10E3/UL (ref 0–1.3)
MONOCYTES NFR BLD AUTO: 7 %
NEUTROPHILS # BLD AUTO: 2 10E3/UL (ref 1.6–8.3)
NEUTROPHILS NFR BLD AUTO: 62 %
NRBC # BLD AUTO: 0 10E3/UL
NRBC BLD AUTO-RTO: 0 /100
PLATELET # BLD AUTO: 119 10E3/UL (ref 150–450)
POTASSIUM SERPL-SCNC: 4.7 MMOL/L (ref 3.4–5.3)
PROT SERPL-MCNC: 7.1 G/DL (ref 6.4–8.3)
RBC # BLD AUTO: 4.5 10E6/UL (ref 4.4–5.9)
SODIUM SERPL-SCNC: 132 MMOL/L (ref 135–145)
TOTAL PROTEIN SERUM FOR ELP: 6.9 G/DL (ref 6.4–8.3)
WBC # BLD AUTO: 3.2 10E3/UL (ref 4–11)

## 2024-07-05 PROCEDURE — 84155 ASSAY OF PROTEIN SERUM: CPT

## 2024-07-05 PROCEDURE — 82784 ASSAY IGA/IGD/IGG/IGM EACH: CPT

## 2024-07-05 PROCEDURE — 36415 COLL VENOUS BLD VENIPUNCTURE: CPT

## 2024-07-05 PROCEDURE — 85004 AUTOMATED DIFF WBC COUNT: CPT

## 2024-07-05 PROCEDURE — 83521 IG LIGHT CHAINS FREE EACH: CPT

## 2024-07-05 PROCEDURE — 86334 IMMUNOFIX E-PHORESIS SERUM: CPT | Mod: 26 | Performed by: PATHOLOGY

## 2024-07-05 PROCEDURE — 84165 PROTEIN E-PHORESIS SERUM: CPT | Mod: 26 | Performed by: PATHOLOGY

## 2024-07-05 PROCEDURE — 82040 ASSAY OF SERUM ALBUMIN: CPT

## 2024-07-05 PROCEDURE — 84165 PROTEIN E-PHORESIS SERUM: CPT | Mod: TC | Performed by: PATHOLOGY

## 2024-07-05 PROCEDURE — 86334 IMMUNOFIX E-PHORESIS SERUM: CPT | Performed by: PATHOLOGY

## 2024-07-08 LAB
ALBUMIN SERPL ELPH-MCNC: 3.9 G/DL (ref 3.7–5.1)
ALPHA1 GLOB SERPL ELPH-MCNC: 0.2 G/DL (ref 0.2–0.4)
ALPHA2 GLOB SERPL ELPH-MCNC: 0.4 G/DL (ref 0.5–0.9)
B-GLOBULIN SERPL ELPH-MCNC: 0.5 G/DL (ref 0.6–1)
GAMMA GLOB SERPL ELPH-MCNC: 1.9 G/DL (ref 0.7–1.6)
M PROTEIN SERPL ELPH-MCNC: 1.3 G/DL
PROT PATTERN SERPL ELPH-IMP: ABNORMAL
PROT PATTERN SERPL IFE-IMP: NORMAL

## 2024-07-11 ENCOUNTER — ONCOLOGY VISIT (OUTPATIENT)
Dept: ONCOLOGY | Facility: HOSPITAL | Age: 74
End: 2024-07-11
Attending: INTERNAL MEDICINE
Payer: COMMERCIAL

## 2024-07-11 VITALS
TEMPERATURE: 96 F | HEART RATE: 54 BPM | HEIGHT: 74 IN | SYSTOLIC BLOOD PRESSURE: 143 MMHG | DIASTOLIC BLOOD PRESSURE: 91 MMHG | BODY MASS INDEX: 22.59 KG/M2 | OXYGEN SATURATION: 100 % | RESPIRATION RATE: 16 BRPM | WEIGHT: 176 LBS

## 2024-07-11 DIAGNOSIS — D47.2 MGUS (MONOCLONAL GAMMOPATHY OF UNKNOWN SIGNIFICANCE): Primary | ICD-10-CM

## 2024-07-11 DIAGNOSIS — C91.10 CHRONIC LYMPHOCYTIC LEUKEMIA (H): ICD-10-CM

## 2024-07-11 PROCEDURE — 99213 OFFICE O/P EST LOW 20 MIN: CPT | Performed by: INTERNAL MEDICINE

## 2024-07-11 PROCEDURE — G2211 COMPLEX E/M VISIT ADD ON: HCPCS | Mod: FS | Performed by: INTERNAL MEDICINE

## 2024-07-11 PROCEDURE — 99214 OFFICE O/P EST MOD 30 MIN: CPT | Mod: FS | Performed by: INTERNAL MEDICINE

## 2024-07-11 ASSESSMENT — PAIN SCALES - GENERAL: PAINLEVEL: NO PAIN (0)

## 2024-07-11 NOTE — PROGRESS NOTES
"Oncology Rooming Note    July 11, 2024 8:23 AM   Hilario Meeks is a 74 year old male who presents for:    Chief Complaint   Patient presents with    Oncology Clinic Visit     Returning patient consult: MGUS follow up, labs (07/05) results.     Initial Vitals: BP (!) 143/91   Pulse 54   Temp (!) 96  F (35.6  C) (Tympanic)   Resp 16   Ht 1.867 m (6' 1.5\")   Wt 79.8 kg (176 lb)   SpO2 100%   BMI 22.90 kg/m   Estimated body mass index is 22.9 kg/m  as calculated from the following:    Height as of this encounter: 1.867 m (6' 1.5\").    Weight as of this encounter: 79.8 kg (176 lb). Body surface area is 2.03 meters squared.  No Pain (0) Comment: Data Unavailable   No LMP for male patient.  Allergies reviewed: Yes  Medications reviewed: Yes    Medications: Medication refills not needed today.  Pharmacy name entered into [facility]: [id number] - [place], [place] - [address] [place] [address]    Frailty Screening:   Is the patient here for a new oncology consult visit in cancer care? 2. No      Clinical concerns:  RETURN CCSL - labs results.      Huong Dick MA              "

## 2024-07-11 NOTE — LETTER
7/11/2024      Hilario Meeks  [address]  [place] [address]      Dear Colleague,    Thank you for referring your patient, Hilario Meeks, to the [facility]. Please see a copy of my visit note below.    [facility] Hematology and Oncology Progress Note    Patient: Hilario Meeks  MRN: [id number]  Date of Service: Jul 11, 2024         Reason for Visit    Chief Complaint   Patient presents with     Oncology Clinic Visit     Returning patient consult: MGUS follow up, labs (07/05) results.       Assessment and Plan     Cancer Staging   No matching staging information was found for the patient.      ECOG Performance    0 - Independent     Pain  Pain Score: No Pain (0)      #.  IgG lambda MGUS    He is clinically stable without signs or symptoms suggestive of plasma cell disease progression or myeloma defining illness.  CBC is normal aside from slightly decreased WBC of 3.2 and new thrombocytopenia of 119 (previously 159 last year). Hgb is 14.9 today.  He has normal liver function, kidney function and calcium.  Monoclonal peak is 1.3 <-- 1.4 <--1.8 <--1.5 <-- 1.8.  IgG, lambda free light chains have decreased from 18.61 to 12.64 with Kappa/lambda ratio remains stable. IGG stable at 2,235. CMP shows persistent hyponatremia of 132. No new bone pain. I recommended continued surveillance at this point with follow-up labs and exam in 6 months.  He is advised to call me sooner with any concerns.    #.  CLL, Trejo stage 2   Clinically stable without any signs and symptoms suggestive of CLL progression or B symptoms.  In peripheral blood, there is no signs of lymphocytosis at this point.  Hgb is 14.9 today. PLT count slightly decreased to 119. No indication for CLL treatment.      #. Hepatic Steatosis  #. Thrombocytopenia  CT from 2019 showed hepatic steatitis with splenomegaly from possible portal hypertension. Pt reports drinking 2-3 glasses of wine daily. Discussed  concern with low platelets count of 119 today. As his hemoglobin is normal and there is no lymphocytosis, it is unlikely to be from his CLL. Given his history of hepatic steatosis and splenomegaly, this may be contributing to his thrombocytopenia. However, liver function is normal on CMP. Encouraged pt to limit or stop alcohol intake along with tylenol. Will continue to monitor. .     Labs and clinical exam in 6 months due to new thrombocytopenia.     Encounter Diagnoses:    Problem List Items Addressed This Visit       Chronic lymphocytic leukemia (H)    Relevant Orders    Comprehensive metabolic panel (BMP + Alb, Alk Phos, ALT, AST, Total. Bili, TP)    CBC with platelets and differential    MGUS (monoclonal gammopathy of unknown significance) - Primary    Relevant Orders    Protein electrophoresis    Kappa and lambda light chain    Immunoglobulins A G and M    CBC with platelets and differential            CC: Matthew Hopper MD   ______________________________________________________________________________  Diagnosis  4/2019- CLL, Trejo stage II by mild lymphocytosis, mild thrombocytopenia and splenomegaly.  Flow cytometry confirms CD5 positive lambda light chain restricted monoclonal B-cell population (39%)    -IgG lambda monoclonal gammopathy, M spike 1.4, IgG 2300, serum free light chain ratio of 0.04.    Treatment to date  Observation    History of Present Illness    Mr. Hilario Meeks presented today accompanied by his wife, Luz.  He is doing very well.  No new concerns today.  He does not have any fever, drenching sweats, unintentional weight loss, bone pain, frequent infections, neuropathy symptoms. He reports drinking 2-3 glasses of wine daily. Denies new headaches, vision changes, breathing changes, abdominal pain, nausea, diarrhea, constipation, or leg swelling.     Review of systems  Pertinent items notes in history.     Past History    Past Medical History:   Diagnosis Date     BPH (benign  "prostatic hyperplasia)        Past Surgical History:   Procedure Laterality Date     BONE MARROW BIOPSY, BONE SPECIMEN, NEEDLE/TROCAR  2019     LASER HOLMIUM ENUCLEATION PROSTATE N/A 6/27/2019    Procedure: Holmium Laser Enucleation of the Prostate;  Surgeon: Chester Turner MD;  Location: [facility] OR     TRANSRECTAL ULTRASONIC, TRANSURETHRAL RESECTION (TUR) OF PROSTATE CYST  2019    HOLEP, BPH, [facility]         Physical Exam    BP (!) 143/91   Pulse 54   Temp (!) 96  F (35.6  C) (Tympanic)   Resp 16   Ht 1.867 m (6' 1.5\")   Wt 79.8 kg (176 lb)   SpO2 100%   BMI 22.90 kg/m      General: alert, awake, not in acute distress  HEENT: Head: Normal, normocephalic, atraumatic.  Anicteric  Eye: Normal external eye, conjunctiva, lids cornea,  Neck / Thyroid: Supple, no masses, nodes, nodules or enlargement.  Lymphatics: No cervical, supraclavicular, or axillary lymphadenopathy.   Abdomen: abdomen is soft without significant tenderness, masses, organomegaly or guarding. Liver edge palpable.  Extremities: normal strength, tone, and muscle mass  Skin: normal. no rash or abnormalities  CNS: non focal.    Lab Results    Recent Results (from the past 168 hour(s))   Comprehensive metabolic panel (BMP + Alb, Alk Phos, ALT, AST, Total. Bili, TP)   Result Value Ref Range    Sodium 132 (L) 135 - 145 mmol/L    Potassium 4.7 3.4 - 5.3 mmol/L    Carbon Dioxide (CO2) 33 (H) 22 - 29 mmol/L    Anion Gap 3 (L) 7 - 15 mmol/L    Urea Nitrogen 15.0 8.0 - 23.0 mg/dL    Creatinine 0.74 0.67 - 1.17 mg/dL    GFR Estimate >90 >60 mL/min/1.73m2    Calcium 8.9 8.8 - 10.2 mg/dL    Chloride 96 (L) 98 - 107 mmol/L    Glucose 91 70 - 99 mg/dL    Alkaline Phosphatase 71 40 - 150 U/L    AST 21 0 - 45 U/L    ALT 15 0 - 70 U/L    Protein Total 7.1 6.4 - 8.3 g/dL    Albumin 3.8 3.5 - 5.2 g/dL    Bilirubin Total 0.7 <=1.2 mg/dL   Kappa and lambda light chain   Result Value Ref Range    Kappa Free Light Chains 0.84 0.33 - 1.94 mg/dL    Lambda Free Light " Chains 12.64 (H) 0.57 - 2.63 mg/dL    Kappa /Lambda Ratio 0.07 (L) 0.26 - 1.65   Immunoglobulins A G and M   Result Value Ref Range    Immunoglobulin G 2,235 (H) 610 - 1,616 mg/dL    Immunoglobulin A 14 (L) 84 - 499 mg/dL    Immunoglobulin M 62 35 - 242 mg/dL   Protein Immunofixation Serum   Result Value Ref Range    Immunofixation ELP       Monoclonal IgG immunoglobulin of lambda light chain type. Pathologic significance requires clinical correlation. Ashtyn Christian MD   CBC with platelets and differential   Result Value Ref Range    WBC Count 3.2 (L) 4.0 - 11.0 10e3/uL    RBC Count 4.50 4.40 - 5.90 10e6/uL    Hemoglobin 14.9 13.3 - 17.7 g/dL    Hematocrit 42.7 40.0 - 53.0 %    MCV 95 78 - 100 fL    MCH 33.1 (H) 26.5 - 33.0 pg    MCHC 34.9 31.5 - 36.5 g/dL    RDW 12.7 10.0 - 15.0 %    Platelet Count 119 (L) 150 - 450 10e3/uL    % Neutrophils 62 %    % Lymphocytes 26 %    % Monocytes 7 %    % Eosinophils 3 %    % Basophils 1 %    % Immature Granulocytes 0 %    NRBCs per 100 WBC 0 <1 /100    Absolute Neutrophils 2.0 1.6 - 8.3 10e3/uL    Absolute Lymphocytes 0.8 0.8 - 5.3 10e3/uL    Absolute Monocytes 0.2 0.0 - 1.3 10e3/uL    Absolute Eosinophils 0.1 0.0 - 0.7 10e3/uL    Absolute Basophils 0.0 0.0 - 0.2 10e3/uL    Absolute Immature Granulocytes 0.0 <=0.4 10e3/uL    Absolute NRBCs 0.0 10e3/uL   Total Protein, Serum for ELP   Result Value Ref Range    Total Protein Serum for ELP 6.9 6.4 - 8.3 g/dL   Protein Electrophoresis, Serum   Result Value Ref Range    Albumin 3.9 3.7 - 5.1 g/dL    Alpha 1 0.2 0.2 - 0.4 g/dL    Alpha 2 0.4 (L) 0.5 - 0.9 g/dL    Beta Globulin 0.5 (L) 0.6 - 1.0 g/dL    Gamma Globulin 1.9 (H) 0.7 - 1.6 g/dL    Monoclonal Peak 1.3 (H) <=0.0 g/dL    ELP Interpretation       Monoclonal protein (1.3 g/dL) seen in the gamma fraction. See immunofixation report on same specimen. Decreased alpha 2 fraction. Decreased beta fraction. Pathologic significance requires clinical correlation. Ashtyn Christian M.D.  "      Imaging    No results found.    Signed by: Yuli Jones PA-C    Attestation signed by Alpa Pedersen MD at 7/12/2024  8:10 AM:      Physician Attestation    I saw and evaluated Hilario Meeks as part of a shared APRN/PA visit.     I personally reviewed the vital signs, medications, and labs.    I personally provided a substantive portion of care for this patient and I approve the care plan as written by the SEKOU.  I was involved with Medical Decision Making including: new onset thrombocytopenia. MGUS stable.  30 MINUTES SPENT BY ME on the date of service doing chart review, history, exam, documentation & further activities per the note.    Alpa Pedersen MD  Date of Service (when I saw the patient): 7/11/24    Oncology Rooming Note    July 11, 2024 8:23 AM   Hilario Meeks is a 74 year old male who presents for:    Chief Complaint   Patient presents with     Oncology Clinic Visit     Returning patient consult: MGUS follow up, labs (07/05) results.     Initial Vitals: BP (!) 143/91   Pulse 54   Temp (!) 96  F (35.6  C) (Tympanic)   Resp 16   Ht 1.867 m (6' 1.5\")   Wt 79.8 kg (176 lb)   SpO2 100%   BMI 22.90 kg/m   Estimated body mass index is 22.9 kg/m  as calculated from the following:    Height as of this encounter: 1.867 m (6' 1.5\").    Weight as of this encounter: 79.8 kg (176 lb). Body surface area is 2.03 meters squared.  No Pain (0) Comment: Data Unavailable   No LMP for male patient.  Allergies reviewed: Yes  Medications reviewed: Yes    Medications: Medication refills not needed today.  Pharmacy name entered into [employer]: [id number] - [place], [place] - [address] [place] [address]    Frailty Screening:   Is the patient here for a new oncology consult visit in cancer care? 2. No      Clinical concerns:  RETURN CCSL - labs results.      Huong Dick MA                Again, thank you for allowing me to participate in the care of your patient.        Sincerely,        Alpa Pedersen MD  "

## 2024-07-11 NOTE — PROGRESS NOTES
[facility] Hematology and Oncology Progress Note    Patient: Hilario Meeks  MRN: [id number]  Date of Service: Jul 11, 2024         Reason for Visit    Chief Complaint   Patient presents with    Oncology Clinic Visit     Returning patient consult: MGUS follow up, labs (07/05) results.       Assessment and Plan     Cancer Staging   No matching staging information was found for the patient.      ECOG Performance    0 - Independent     Pain  Pain Score: No Pain (0)      #.  IgG lambda MGUS    He is clinically stable without signs or symptoms suggestive of plasma cell disease progression or myeloma defining illness.  CBC is normal aside from slightly decreased WBC of 3.2 and new thrombocytopenia of 119 (previously 159 last year). Hgb is 14.9 today.  He has normal liver function, kidney function and calcium.  Monoclonal peak is 1.3 <-- 1.4 <--1.8 <--1.5 <-- 1.8.  IgG, lambda free light chains have decreased from 18.61 to 12.64 with Kappa/lambda ratio remains stable. IGG stable at 2,235. CMP shows persistent hyponatremia of 132. No new bone pain. I recommended continued surveillance at this point with follow-up labs and exam in 6 months.  He is advised to call me sooner with any concerns.    #.  CLL, Trejo stage 2   Clinically stable without any signs and symptoms suggestive of CLL progression or B symptoms.  In peripheral blood, there is no signs of lymphocytosis at this point.  Hgb is 14.9 today. PLT count slightly decreased to 119. No indication for CLL treatment.      #. Hepatic Steatosis  #. Thrombocytopenia  CT from 2019 showed hepatic steatitis with splenomegaly from possible portal hypertension. Pt reports drinking 2-3 glasses of wine daily. Discussed concern with low platelets count of 119 today. As his hemoglobin is normal and there is no lymphocytosis, it is unlikely to be from his CLL. Given his history of hepatic steatosis and splenomegaly, this may be contributing to his thrombocytopenia. However,  liver function is normal on CMP. Encouraged pt to limit or stop alcohol intake along with tylenol. Will continue to monitor. .     Labs and clinical exam in 6 months due to new thrombocytopenia.     Encounter Diagnoses:    Problem List Items Addressed This Visit       Chronic lymphocytic leukemia (H)    Relevant Orders    Comprehensive metabolic panel (BMP + Alb, Alk Phos, ALT, AST, Total. Bili, TP)    CBC with platelets and differential    MGUS (monoclonal gammopathy of unknown significance) - Primary    Relevant Orders    Protein electrophoresis    Kappa and lambda light chain    Immunoglobulins A G and M    CBC with platelets and differential            CC: Matthew Hopper MD   ______________________________________________________________________________  Diagnosis  4/2019- CLL, Trejo stage II by mild lymphocytosis, mild thrombocytopenia and splenomegaly.  Flow cytometry confirms CD5 positive lambda light chain restricted monoclonal B-cell population (39%)    -IgG lambda monoclonal gammopathy, M spike 1.4, IgG 2300, serum free light chain ratio of 0.04.    Treatment to date  Observation    History of Present Illness    . Hilario Meeks presented today accompanied by his wife, Luz.  He is doing very well.  No new concerns today.  He does not have any fever, drenching sweats, unintentional weight loss, bone pain, frequent infections, neuropathy symptoms. He reports drinking 2-3 glasses of wine daily. Denies new headaches, vision changes, breathing changes, abdominal pain, nausea, diarrhea, constipation, or leg swelling.     Review of systems  Pertinent items notes in history.     Past History    Past Medical History:   Diagnosis Date    BPH (benign prostatic hyperplasia)        Past Surgical History:   Procedure Laterality Date    BONE MARROW BIOPSY, BONE SPECIMEN, NEEDLE/TROCAR  2019    LASER HOLMIUM ENUCLEATION PROSTATE N/A 6/27/2019    Procedure: Holmium Laser Enucleation of the Prostate;  Surgeon:  "Chester Turner MD;  Location: [facility]    TRANSRECTAL ULTRASONIC, TRANSURETHRAL RESECTION (TUR) OF PROSTATE CYST  2019    HOLEP, BPH, [facility]         Physical Exam    BP (!) 143/91   Pulse 54   Temp (!) 96  F (35.6  C) (Tympanic)   Resp 16   Ht 1.867 m (6' 1.5\")   Wt 79.8 kg (176 lb)   SpO2 100%   BMI 22.90 kg/m      General: alert, awake, not in acute distress  HEENT: Head: Normal, normocephalic, atraumatic.  Anicteric  Eye: Normal external eye, conjunctiva, lids cornea,  Neck / Thyroid: Supple, no masses, nodes, nodules or enlargement.  Lymphatics: No cervical, supraclavicular, or axillary lymphadenopathy.   Abdomen: abdomen is soft without significant tenderness, masses, organomegaly or guarding. Liver edge palpable.  Extremities: normal strength, tone, and muscle mass  Skin: normal. no rash or abnormalities  CNS: non focal.    Lab Results    Recent Results (from the past 168 hour(s))   Comprehensive metabolic panel (BMP + Alb, Alk Phos, ALT, AST, Total. Bili, TP)   Result Value Ref Range    Sodium 132 (L) 135 - 145 mmol/L    Potassium 4.7 3.4 - 5.3 mmol/L    Carbon Dioxide (CO2) 33 (H) 22 - 29 mmol/L    Anion Gap 3 (L) 7 - 15 mmol/L    Urea Nitrogen 15.0 8.0 - 23.0 mg/dL    Creatinine 0.74 0.67 - 1.17 mg/dL    GFR Estimate >90 >60 mL/min/1.73m2    Calcium 8.9 8.8 - 10.2 mg/dL    Chloride 96 (L) 98 - 107 mmol/L    Glucose 91 70 - 99 mg/dL    Alkaline Phosphatase 71 40 - 150 U/L    AST 21 0 - 45 U/L    ALT 15 0 - 70 U/L    Protein Total 7.1 6.4 - 8.3 g/dL    Albumin 3.8 3.5 - 5.2 g/dL    Bilirubin Total 0.7 <=1.2 mg/dL   Kappa and lambda light chain   Result Value Ref Range    Kappa Free Light Chains 0.84 0.33 - 1.94 mg/dL    Lambda Free Light Chains 12.64 (H) 0.57 - 2.63 mg/dL    Kappa /Lambda Ratio 0.07 (L) 0.26 - 1.65   Immunoglobulins A G and M   Result Value Ref Range    Immunoglobulin G 2,235 (H) 610 - 1,616 mg/dL    Immunoglobulin A 14 (L) 84 - 499 mg/dL    Immunoglobulin M 62 35 - 242 mg/dL "   Protein Immunofixation Serum   Result Value Ref Range    Immunofixation ELP       Monoclonal IgG immunoglobulin of lambda light chain type. Pathologic significance requires clinical correlation. Ashtyn Christian MD   CBC with platelets and differential   Result Value Ref Range    WBC Count 3.2 (L) 4.0 - 11.0 10e3/uL    RBC Count 4.50 4.40 - 5.90 10e6/uL    Hemoglobin 14.9 13.3 - 17.7 g/dL    Hematocrit 42.7 40.0 - 53.0 %    MCV 95 78 - 100 fL    MCH 33.1 (H) 26.5 - 33.0 pg    MCHC 34.9 31.5 - 36.5 g/dL    RDW 12.7 10.0 - 15.0 %    Platelet Count 119 (L) 150 - 450 10e3/uL    % Neutrophils 62 %    % Lymphocytes 26 %    % Monocytes 7 %    % Eosinophils 3 %    % Basophils 1 %    % Immature Granulocytes 0 %    NRBCs per 100 WBC 0 <1 /100    Absolute Neutrophils 2.0 1.6 - 8.3 10e3/uL    Absolute Lymphocytes 0.8 0.8 - 5.3 10e3/uL    Absolute Monocytes 0.2 0.0 - 1.3 10e3/uL    Absolute Eosinophils 0.1 0.0 - 0.7 10e3/uL    Absolute Basophils 0.0 0.0 - 0.2 10e3/uL    Absolute Immature Granulocytes 0.0 <=0.4 10e3/uL    Absolute NRBCs 0.0 10e3/uL   Total Protein, Serum for ELP   Result Value Ref Range    Total Protein Serum for ELP 6.9 6.4 - 8.3 g/dL   Protein Electrophoresis, Serum   Result Value Ref Range    Albumin 3.9 3.7 - 5.1 g/dL    Alpha 1 0.2 0.2 - 0.4 g/dL    Alpha 2 0.4 (L) 0.5 - 0.9 g/dL    Beta Globulin 0.5 (L) 0.6 - 1.0 g/dL    Gamma Globulin 1.9 (H) 0.7 - 1.6 g/dL    Monoclonal Peak 1.3 (H) <=0.0 g/dL    ELP Interpretation       Monoclonal protein (1.3 g/dL) seen in the gamma fraction. See immunofixation report on same specimen. Decreased alpha 2 fraction. Decreased beta fraction. Pathologic significance requires clinical correlation. Ashtyn Christian M.D.       Imaging    No results found.    Signed by: Yuli Jones PA-C

## 2024-07-14 ENCOUNTER — HEALTH MAINTENANCE LETTER (OUTPATIENT)
Age: 74
End: 2024-07-14

## 2024-11-21 ENCOUNTER — TRANSFERRED RECORDS (OUTPATIENT)
Dept: HEALTH INFORMATION MANAGEMENT | Facility: CLINIC | Age: 74
End: 2024-11-21
Payer: COMMERCIAL

## 2025-01-20 ENCOUNTER — LAB (OUTPATIENT)
Dept: INFUSION THERAPY | Facility: HOSPITAL | Age: 75
End: 2025-01-20
Attending: INTERNAL MEDICINE
Payer: COMMERCIAL

## 2025-01-20 DIAGNOSIS — D47.2 MGUS (MONOCLONAL GAMMOPATHY OF UNKNOWN SIGNIFICANCE): ICD-10-CM

## 2025-01-20 DIAGNOSIS — C91.10 CHRONIC LYMPHOCYTIC LEUKEMIA (H): ICD-10-CM

## 2025-01-20 LAB
ALBUMIN SERPL BCG-MCNC: 3.8 G/DL (ref 3.5–5.2)
ALP SERPL-CCNC: 79 U/L (ref 40–150)
ALT SERPL W P-5'-P-CCNC: 17 U/L (ref 0–70)
ANION GAP SERPL CALCULATED.3IONS-SCNC: 8 MMOL/L (ref 7–15)
AST SERPL W P-5'-P-CCNC: 25 U/L (ref 0–45)
BASOPHILS # BLD AUTO: 0 10E3/UL (ref 0–0.2)
BASOPHILS NFR BLD AUTO: 1 %
BILIRUB SERPL-MCNC: 0.8 MG/DL
BUN SERPL-MCNC: 11.8 MG/DL (ref 8–23)
CALCIUM SERPL-MCNC: 8.5 MG/DL (ref 8.8–10.4)
CHLORIDE SERPL-SCNC: 96 MMOL/L (ref 98–107)
CREAT SERPL-MCNC: 0.74 MG/DL (ref 0.67–1.17)
EGFRCR SERPLBLD CKD-EPI 2021: >90 ML/MIN/1.73M2
EOSINOPHIL # BLD AUTO: 0.1 10E3/UL (ref 0–0.7)
EOSINOPHIL NFR BLD AUTO: 3 %
ERYTHROCYTE [DISTWIDTH] IN BLOOD BY AUTOMATED COUNT: 12.4 % (ref 10–15)
GLUCOSE SERPL-MCNC: 70 MG/DL (ref 70–99)
HCO3 SERPL-SCNC: 30 MMOL/L (ref 22–29)
HCT VFR BLD AUTO: 43.4 % (ref 40–53)
HGB BLD-MCNC: 15.3 G/DL (ref 13.3–17.7)
IMM GRANULOCYTES # BLD: 0 10E3/UL
IMM GRANULOCYTES NFR BLD: 0 %
LYMPHOCYTES # BLD AUTO: 0.9 10E3/UL (ref 0.8–5.3)
LYMPHOCYTES NFR BLD AUTO: 26 %
MCH RBC QN AUTO: 33.3 PG (ref 26.5–33)
MCHC RBC AUTO-ENTMCNC: 35.3 G/DL (ref 31.5–36.5)
MCV RBC AUTO: 95 FL (ref 78–100)
MONOCYTES # BLD AUTO: 0.3 10E3/UL (ref 0–1.3)
MONOCYTES NFR BLD AUTO: 9 %
NEUTROPHILS # BLD AUTO: 2.1 10E3/UL (ref 1.6–8.3)
NEUTROPHILS NFR BLD AUTO: 62 %
NRBC # BLD AUTO: 0 10E3/UL
NRBC BLD AUTO-RTO: 0 /100
PLATELET # BLD AUTO: 142 10E3/UL (ref 150–450)
POTASSIUM SERPL-SCNC: 4.5 MMOL/L (ref 3.4–5.3)
PROT SERPL-MCNC: 7.2 G/DL (ref 6.4–8.3)
RBC # BLD AUTO: 4.59 10E6/UL (ref 4.4–5.9)
SODIUM SERPL-SCNC: 134 MMOL/L (ref 135–145)
TOTAL PROTEIN SERUM FOR ELP: 6.8 G/DL (ref 6.4–8.3)
WBC # BLD AUTO: 3.4 10E3/UL (ref 4–11)

## 2025-01-20 PROCEDURE — 84155 ASSAY OF PROTEIN SERUM: CPT

## 2025-01-20 PROCEDURE — 82374 ASSAY BLOOD CARBON DIOXIDE: CPT

## 2025-01-20 PROCEDURE — 85004 AUTOMATED DIFF WBC COUNT: CPT

## 2025-01-20 PROCEDURE — 85041 AUTOMATED RBC COUNT: CPT

## 2025-01-20 PROCEDURE — 82784 ASSAY IGA/IGD/IGG/IGM EACH: CPT

## 2025-01-20 PROCEDURE — 83521 IG LIGHT CHAINS FREE EACH: CPT

## 2025-01-20 PROCEDURE — 85048 AUTOMATED LEUKOCYTE COUNT: CPT

## 2025-01-20 PROCEDURE — 36415 COLL VENOUS BLD VENIPUNCTURE: CPT

## 2025-01-20 PROCEDURE — 84165 PROTEIN E-PHORESIS SERUM: CPT | Mod: TC | Performed by: PATHOLOGY

## 2025-01-21 LAB
ALBUMIN SERPL ELPH-MCNC: 3.8 G/DL (ref 3.7–5.1)
ALPHA1 GLOB SERPL ELPH-MCNC: 0.2 G/DL (ref 0.2–0.4)
ALPHA2 GLOB SERPL ELPH-MCNC: 0.4 G/DL (ref 0.5–0.9)
B-GLOBULIN SERPL ELPH-MCNC: 0.5 G/DL (ref 0.6–1)
GAMMA GLOB SERPL ELPH-MCNC: 1.8 G/DL (ref 0.7–1.6)
IGA SERPL-MCNC: 14 MG/DL (ref 84–499)
IGG SERPL-MCNC: 2071 MG/DL (ref 610–1616)
IGM SERPL-MCNC: 46 MG/DL (ref 35–242)
KAPPA LC FREE SER-MCNC: 0.83 MG/DL (ref 0.33–1.94)
KAPPA LC FREE/LAMBDA FREE SER NEPH: 0.07 {RATIO} (ref 0.26–1.65)
LAMBDA LC FREE SERPL-MCNC: 12.53 MG/DL (ref 0.57–2.63)
M PROTEIN SERPL ELPH-MCNC: 1.4 G/DL
PROT PATTERN SERPL ELPH-IMP: ABNORMAL

## 2025-01-24 ENCOUNTER — ONCOLOGY VISIT (OUTPATIENT)
Dept: ONCOLOGY | Facility: HOSPITAL | Age: 75
End: 2025-01-24
Attending: INTERNAL MEDICINE
Payer: COMMERCIAL

## 2025-01-24 VITALS
SYSTOLIC BLOOD PRESSURE: 160 MMHG | RESPIRATION RATE: 16 BRPM | BODY MASS INDEX: 22.59 KG/M2 | HEIGHT: 74 IN | WEIGHT: 176 LBS | TEMPERATURE: 97.3 F | HEART RATE: 64 BPM | OXYGEN SATURATION: 99 % | DIASTOLIC BLOOD PRESSURE: 92 MMHG

## 2025-01-24 DIAGNOSIS — D47.2 MGUS (MONOCLONAL GAMMOPATHY OF UNKNOWN SIGNIFICANCE): Primary | ICD-10-CM

## 2025-01-24 DIAGNOSIS — C91.10 CHRONIC LYMPHOCYTIC LEUKEMIA (H): ICD-10-CM

## 2025-01-24 PROCEDURE — G2211 COMPLEX E/M VISIT ADD ON: HCPCS | Performed by: INTERNAL MEDICINE

## 2025-01-24 PROCEDURE — G0463 HOSPITAL OUTPT CLINIC VISIT: HCPCS | Performed by: INTERNAL MEDICINE

## 2025-01-24 PROCEDURE — 99214 OFFICE O/P EST MOD 30 MIN: CPT | Performed by: INTERNAL MEDICINE

## 2025-01-24 ASSESSMENT — PAIN SCALES - GENERAL: PAINLEVEL_OUTOF10: NO PAIN (0)

## 2025-01-24 NOTE — LETTER
"1/24/2025      Hilario Meeks  [address]  [place] [address]      Dear Colleague,    Thank you for referring your patient, Hilario Meeks, to the [facility]. Please see a copy of my visit note below.    Oncology Rooming Note    January 24, 2025 8:55 AM   Hilaroi Meeks is a 74 year old male who presents for:    Chief Complaint   Patient presents with     Oncology Clinic Visit     Chronic lymphocytic leukemia, MGUS (monoclonal gammopathy of unknown significance)     Initial Vitals: BP (!) 160/92 (BP Location: Left arm, Patient Position: Sitting, Cuff Size: Adult Regular)   Pulse 64   Temp 97.3  F (36.3  C) (Tympanic)   Resp 16   Ht 1.867 m (6' 1.5\")   Wt 79.8 kg (176 lb)   SpO2 99%   BMI 22.91 kg/m   Estimated body mass index is 22.91 kg/m  as calculated from the following:    Height as of this encounter: 1.867 m (6' 1.5\").    Weight as of this encounter: 79.8 kg (176 lb). Body surface area is 2.03 meters squared.  No Pain (0) Comment: Data Unavailable   No LMP for male patient.  Allergies reviewed: Yes  Medications reviewed: Yes    Medications: Medication refills not needed today.  Pharmacy name entered into [employer]: [id number] - [place], [place] - [address]    Frailty Screening:   Is the patient here for a new oncology consult visit in cancer care? 2. No      Clinical concerns:  6 months labs       Nilda Hoff              [facility] Hematology and Oncology Progress Note    Patient: Hilario Meeks  MRN: [id number]  Date of Service: Jan 24, 2025         Reason for Visit    Chief Complaint   Patient presents with     Oncology Clinic Visit     Chronic lymphocytic leukemia, MGUS (monoclonal gammopathy of unknown significance)       Assessment and Plan     Cancer Staging   No matching staging information was found for the patient.      ECOG Performance    0 - Independent     Pain  Pain Score: No Pain (0)      #.  IgG lambda MGUS    He " is clinically doing well without signs or symptoms suggestive of plasma cell disease progression or myeloma defining illness.  CBC is entirely normal.  He has normal liver function, kidney function and calcium, expect mild chronic hyponatremia of no clinical significance.  Monoclonal peak is stable at 1.4.  IgG, lambda and Kappa/lambda ratio remains stable.     I recommended continued surveillance at this point with follow-up labs and exam in 1 year.   He is advised to call me sooner with any concerns.    #.  CLL, Trejo stage 2   Clinically stable without any signs and symptoms suggestive of CLL progression or B symptoms.  In peripheral blood, there is no signs of lymphocytosis at this point.  No indication for CLL treatment.    I recommended clinical surveillance with labs and clinical exam in 1 year as above.    Encounter Diagnoses:    Problem List Items Addressed This Visit          Oncology Diagnoses    Chronic lymphocytic leukemia (H)    Relevant Orders    CBC with Platelets & Differential    Comprehensive metabolic panel    Protein electrophoresis    Immunoglobulins A G and M    Protein Immunofixation Serum    Kappa and lambda light chain    MGUS (monoclonal gammopathy of unknown significance) - Primary    Relevant Orders    CBC with Platelets & Differential    Comprehensive metabolic panel    Protein electrophoresis    Immunoglobulins A G and M    Protein Immunofixation Serum    Kappa and lambda light chain            CC: Matthew Hopper MD   ______________________________________________________________________________  Diagnosis  4/2019- CLL, Trejo stage II by mild lymphocytosis, mild thrombocytopenia and splenomegaly.  Flow cytometry confirms CD5 positive lambda light chain restricted monoclonal B-cell population (39%)     -IgG lambda monoclonal gammopathy, M spike 1.4, IgG 2300, serum free light chain ratio of 0.04.    Treatment to date  Observation    History of Present Illness    Mr. Hilario MARTIN Jeanna  "presented today accompanied by his wife, Luz.    He does not have any concerns today. He does not have any fever, drenching sweats, unintentional weight loss, bone pain, frequent infections, neuropathy symptoms.    Review of systems  Apart from describing in HPI, the remainder of comprehensive ROS was negative.    Past History    Past Medical History:   Diagnosis Date     BPH (benign prostatic hyperplasia)        Past Surgical History:   Procedure Laterality Date     BONE MARROW BIOPSY, BONE SPECIMEN, NEEDLE/TROCAR  2019     LASER HOLMIUM ENUCLEATION PROSTATE N/A 6/27/2019    Procedure: Holmium Laser Enucleation of the Prostate;  Surgeon: Chester Turner MD;  [facility]: [facility]     TRANSRECTAL ULTRASONIC, TRANSURETHRAL RESECTION (TUR) OF PROSTATE CYST  2019    HOLEP, BPH, [facility]         Physical Exam    BP (!) 160/92 (BP Location: Left arm, Patient Position: Sitting, Cuff Size: Adult Regular)   Pulse 64   Temp 97.3  F (36.3  C) (Tympanic)   Resp 16   Ht 1.867 m (6' 1.5\")   Wt 79.8 kg (176 lb)   SpO2 99%   BMI 22.91 kg/m      General: alert, awake, not in acute distress  HEENT: Head: Normal, normocephalic, atraumatic.  Eye: Normal external eye, conjunctiva, lids cornea, YARITZA.  Pharynx: Normal buccal mucosa. Normal pharynx.  Neck / Thyroid: Supple, no masses, nodes, nodules or enlargement.  Lymphatics: No abnormally enlarged lymph nodes.  Abdomen: abdomen is soft without significant tenderness, masses, organomegaly or guarding  Extremities: normal strength, tone, and muscle mass  Skin: normal. no rash or abnormalities  CNS: non focal.    Lab Results    No results found for this or any previous visit (from the past week).    Imaging    No results found.    The longitudinal plan of care for the diagnosis(es)/condition(s) as documented were addressed during this visit. Due to the added complexity in care, I will continue to support Hilario in the subsequent management and with ongoing continuity of care. "     30 minutes spent by me on the date of the encounter doing chart review, history and exam, documentation and further activities as noted above.    Signed by: Alpa Pedersen MD      Again, thank you for allowing me to participate in the care of your patient.        Sincerely,        Alpa Pedersen MD    Electronically signed

## 2025-01-24 NOTE — PROGRESS NOTES
"Oncology Rooming Note    January 24, 2025 8:55 AM   Hilario Meeks is a 74 year old male who presents for:    Chief Complaint   Patient presents with    Oncology Clinic Visit     Chronic lymphocytic leukemia, MGUS (monoclonal gammopathy of unknown significance)     Initial Vitals: BP (!) 160/92 (BP Location: Left arm, Patient Position: Sitting, Cuff Size: Adult Regular)   Pulse 64   Temp 97.3  F (36.3  C) (Tympanic)   Resp 16   Ht 1.867 m (6' 1.5\")   Wt 79.8 kg (176 lb)   SpO2 99%   BMI 22.91 kg/m   Estimated body mass index is 22.91 kg/m  as calculated from the following:    Height as of this encounter: 1.867 m (6' 1.5\").    Weight as of this encounter: 79.8 kg (176 lb). Body surface area is 2.03 meters squared.  No Pain (0) Comment: Data Unavailable   No LMP for male patient.  Allergies reviewed: Yes  Medications reviewed: Yes    Medications: Medication refills not needed today.  Pharmacy name entered into [employer]: [id number] - [place], [place] - [address] [place] [address]    Frailty Screening:   Is the patient here for a new oncology consult visit in cancer care? 2. No      Clinical concerns:  6 months labs       Nilda Hoff            "

## 2025-01-28 NOTE — PROGRESS NOTES
[facility] Hematology and Oncology Progress Note    Patient: Hilario Meeks  MRN: [id number]  Date of Service: Jan 24, 2025         Reason for Visit    Chief Complaint   Patient presents with    Oncology Clinic Visit     Chronic lymphocytic leukemia, MGUS (monoclonal gammopathy of unknown significance)       Assessment and Plan     Cancer Staging   No matching staging information was found for the patient.      ECOG Performance    0 - Independent     Pain  Pain Score: No Pain (0)      #.  IgG lambda MGUS    He is clinically doing well without signs or symptoms suggestive of plasma cell disease progression or myeloma defining illness.  CBC is entirely normal.  He has normal liver function, kidney function and calcium, expect mild chronic hyponatremia of no clinical significance.  Monoclonal peak is stable at 1.4.  IgG, lambda and Kappa/lambda ratio remains stable.     I recommended continued surveillance at this point with follow-up labs and exam in 1 year.   He is advised to call me sooner with any concerns.    #.  CLL, Trejo stage 2   Clinically stable without any signs and symptoms suggestive of CLL progression or B symptoms.  In peripheral blood, there is no signs of lymphocytosis at this point.  No indication for CLL treatment.    I recommended clinical surveillance with labs and clinical exam in 1 year as above.    Encounter Diagnoses:    Problem List Items Addressed This Visit          Oncology Diagnoses    Chronic lymphocytic leukemia (H)    Relevant Orders    CBC with Platelets & Differential    Comprehensive metabolic panel    Protein electrophoresis    Immunoglobulins A G and M    Protein Immunofixation Serum    Kappa and lambda light chain    MGUS (monoclonal gammopathy of unknown significance) - Primary    Relevant Orders    CBC with Platelets & Differential    Comprehensive metabolic panel    Protein electrophoresis    Immunoglobulins A G and M    Protein Immunofixation Serum    Kappa and lambda  "light chain            CC: Matthew Hopper MD   ______________________________________________________________________________  Diagnosis  4/2019- CLL, Trejo stage II by mild lymphocytosis, mild thrombocytopenia and splenomegaly.  Flow cytometry confirms CD5 positive lambda light chain restricted monoclonal B-cell population (39%)     -IgG lambda monoclonal gammopathy, M spike 1.4, IgG 2300, serum free light chain ratio of 0.04.    Treatment to date  Observation    History of Present Illness    . Hilario Meeks presented today accompanied by his wife, Luz.    He does not have any concerns today. He does not have any fever, drenching sweats, unintentional weight loss, bone pain, frequent infections, neuropathy symptoms.    Review of systems  Apart from describing in HPI, the remainder of comprehensive ROS was negative.    Past History    Past Medical History:   Diagnosis Date    BPH (benign prostatic hyperplasia)        Past Surgical History:   Procedure Laterality Date    BONE MARROW BIOPSY, BONE SPECIMEN, NEEDLE/TROCAR  2019    LASER HOLMIUM ENUCLEATION PROSTATE N/A 6/27/2019    Procedure: Holmium Laser Enucleation of the Prostate;  Surgeon: Chester Turner MD;  [facility]    TRANSRECTAL ULTRASONIC, TRANSURETHRAL RESECTION (TUR) OF PROSTATE CYST  2019    HOLEP, BPH, [facility]         Physical Exam    BP (!) 160/92 (BP Location: Left arm, Patient Position: Sitting, Cuff Size: Adult Regular)   Pulse 64   Temp 97.3  F (36.3  C) (Tympanic)   Resp 16   Ht 1.867 m (6' 1.5\")   Wt 79.8 kg (176 lb)   SpO2 99%   BMI 22.91 kg/m      General: alert, awake, not in acute distress  HEENT: Head: Normal, normocephalic, atraumatic.  Eye: Normal external eye, conjunctiva, lids cornea, YARITZA.  Pharynx: Normal buccal mucosa. Normal pharynx.  Neck / Thyroid: Supple, no masses, nodes, nodules or enlargement.  Lymphatics: No abnormally enlarged lymph nodes.  Abdomen: abdomen is soft without significant tenderness, " masses, organomegaly or guarding  Extremities: normal strength, tone, and muscle mass  Skin: normal. no rash or abnormalities  CNS: non focal.    Lab Results    No results found for this or any previous visit (from the past week).    Imaging    No results found.    The longitudinal plan of care for the diagnosis(es)/condition(s) as documented were addressed during this visit. Due to the added complexity in care, I will continue to support Hilario in the subsequent management and with ongoing continuity of care.     30 minutes spent by me on the date of the encounter doing chart review, history and exam, documentation and further activities as noted above.    Signed by: Alpa Pedersen MD

## 2025-07-19 ENCOUNTER — HEALTH MAINTENANCE LETTER (OUTPATIENT)
Age: 75
End: 2025-07-19

## (undated) DEVICE — LINEN TOWEL PACK X5 5464

## (undated) DEVICE — TUBING SET PIRANHA 41702208

## (undated) DEVICE — TUBING SUCTION MEDI-VAC 1/4"X20' N620A

## (undated) DEVICE — DRSG GAUZE 4X8" NON21842

## (undated) DEVICE — SOL NACL 0.9% IRRIG 1000ML BOTTLE 2F7124

## (undated) DEVICE — STRAP KNEE/BODY 31143004

## (undated) DEVICE — SYR 50ML LL W/O NDL 309653

## (undated) DEVICE — SOL NACL 0.9% IRRIG 3000ML BAG 2B7477

## (undated) DEVICE — BLADE MORCELLATOR WOLF PIRANHA 4.75X385MM 49700103

## (undated) DEVICE — DRSG ABDOMINAL 07 1/2X8" 7197D

## (undated) DEVICE — GLOVE PROTEXIS W/NEU-THERA 7.5  2D73TE75

## (undated) DEVICE — TUBING IRRIG CYSTO/BLADDER SET 81" LF 2C4040

## (undated) DEVICE — PAD CHUX UNDERPAD 30X36" P3036C

## (undated) DEVICE — SYR 70ML TOOMEY 041170

## (undated) DEVICE — SUCTION WATERBUG FLOOR PUDDLE VAC 9321

## (undated) DEVICE — SPECIMEN TRAP TISSUE CONTAINER PIRANHA 2208120

## (undated) DEVICE — LINEN GOWN X4 5410

## (undated) DEVICE — DRAPE MAYO STAND 23X54 8337

## (undated) DEVICE — SOL WATER IRRIG 1000ML BOTTLE 2F7114

## (undated) DEVICE — JELLY LUBRICATING SURGILUBE 2OZ TUBE 0281-0205-02

## (undated) DEVICE — BAG URINARY DRAIN LUBRISIL IC 4000ML LF 253509A

## (undated) DEVICE — Device

## (undated) DEVICE — FILTER PIRANHA DISP 2228.901

## (undated) DEVICE — SUCTION MANIFOLD DORNOCH ULTRA CART UL-CL500

## (undated) DEVICE — CATH FOLEY 3WAY 22FR 30ML SIL 73022SI

## (undated) DEVICE — CATH LASER URETERAL 7.1FRX40CM G17797  022403-7.1-40

## (undated) DEVICE — TAPE DURAPORE ADVANCED PURPLE 3" 1590-3

## (undated) DEVICE — LASER FIBER HOLMIUM END FIRE SLIMLINE EZ550 M0068408940

## (undated) DEVICE — THERMEDX UROLOGY SET

## (undated) RX ORDER — LIDOCAINE HYDROCHLORIDE 20 MG/ML
INJECTION, SOLUTION EPIDURAL; INFILTRATION; INTRACAUDAL; PERINEURAL
Status: DISPENSED
Start: 2019-06-27

## (undated) RX ORDER — FENTANYL CITRATE 50 UG/ML
INJECTION, SOLUTION INTRAMUSCULAR; INTRAVENOUS
Status: DISPENSED
Start: 2019-06-27

## (undated) RX ORDER — LEVOFLOXACIN 5 MG/ML
INJECTION, SOLUTION INTRAVENOUS
Status: DISPENSED
Start: 2019-06-27

## (undated) RX ORDER — LIDOCAINE HYDROCHLORIDE 20 MG/ML
JELLY TOPICAL
Status: DISPENSED
Start: 2019-06-18

## (undated) RX ORDER — CEFTRIAXONE SODIUM 1 G
VIAL (EA) INJECTION
Status: DISPENSED
Start: 2019-06-27

## (undated) RX ORDER — GLYCOPYRROLATE 0.2 MG/ML
INJECTION INTRAMUSCULAR; INTRAVENOUS
Status: DISPENSED
Start: 2019-06-27

## (undated) RX ORDER — CIPROFLOXACIN 500 MG/1
TABLET, FILM COATED ORAL
Status: DISPENSED
Start: 2019-06-18